# Patient Record
Sex: FEMALE | Race: WHITE | Employment: FULL TIME | URBAN - METROPOLITAN AREA
[De-identification: names, ages, dates, MRNs, and addresses within clinical notes are randomized per-mention and may not be internally consistent; named-entity substitution may affect disease eponyms.]

---

## 2017-05-19 DIAGNOSIS — I10 ESSENTIAL HYPERTENSION WITH GOAL BLOOD PRESSURE LESS THAN 130/80: ICD-10-CM

## 2017-05-19 RX ORDER — HYDROCHLOROTHIAZIDE 25 MG/1
25 TABLET ORAL DAILY
Qty: 30 TAB | Refills: 0 | Status: SHIPPED | OUTPATIENT
Start: 2017-05-19 | End: 2017-05-30 | Stop reason: SDUPTHER

## 2017-05-19 NOTE — TELEPHONE ENCOUNTER
Refill request:   Requested Prescriptions     Pending Prescriptions Disp Refills    hydroCHLOROthiazide (HYDRODIURIL) 25 mg tablet 30 Tab 11     Sig: Take 1 Tab by mouth daily.  For blood pressure       Patient completely out of medication , Pharmacy states this was faxed several times-

## 2017-05-30 ENCOUNTER — OFFICE VISIT (OUTPATIENT)
Dept: FAMILY MEDICINE CLINIC | Age: 52
End: 2017-05-30

## 2017-05-30 ENCOUNTER — TELEPHONE (OUTPATIENT)
Dept: FAMILY MEDICINE CLINIC | Age: 52
End: 2017-05-30

## 2017-05-30 VITALS
BODY MASS INDEX: 29.45 KG/M2 | SYSTOLIC BLOOD PRESSURE: 138 MMHG | RESPIRATION RATE: 12 BRPM | HEART RATE: 62 BPM | OXYGEN SATURATION: 98 % | WEIGHT: 156 LBS | TEMPERATURE: 98.1 F | HEIGHT: 61 IN | DIASTOLIC BLOOD PRESSURE: 90 MMHG

## 2017-05-30 DIAGNOSIS — F41.8 MIXED ANXIETY AND DEPRESSIVE DISORDER: ICD-10-CM

## 2017-05-30 DIAGNOSIS — Z12.11 SCREENING FOR COLON CANCER: ICD-10-CM

## 2017-05-30 DIAGNOSIS — I10 ESSENTIAL HYPERTENSION: Primary | ICD-10-CM

## 2017-05-30 DIAGNOSIS — B00.9 HERPES: ICD-10-CM

## 2017-05-30 DIAGNOSIS — B00.9 HERPES: Primary | ICD-10-CM

## 2017-05-30 RX ORDER — HYDROCHLOROTHIAZIDE 25 MG/1
25 TABLET ORAL DAILY
Qty: 30 TAB | Refills: 11 | Status: SHIPPED | OUTPATIENT
Start: 2017-05-30 | End: 2017-11-28 | Stop reason: SDUPTHER

## 2017-05-30 RX ORDER — ACYCLOVIR 50 MG/G
CREAM TOPICAL
COMMUNITY
End: 2017-05-30 | Stop reason: SDUPTHER

## 2017-05-30 RX ORDER — ACYCLOVIR 50 MG/G
CREAM TOPICAL
Qty: 5 G | Refills: 3 | Status: SHIPPED | OUTPATIENT
Start: 2017-05-30 | End: 2017-06-01

## 2017-05-30 NOTE — TELEPHONE ENCOUNTER
Received faxed from [employer] Zovirax 5 % cream not covered by her insurance $800.00 she needs something else

## 2017-05-30 NOTE — PROGRESS NOTES
Truman Gutierrez is a 47 y.o. female who was seen in clinic today (5/30/2017). Subjective:  Cardiovascular Review:  The patient has hypertension. Diet and Lifestyle: generally follows a low fat low cholesterol diet, generally follows a low sodium diet, exercises regularly, nonsmoker  Home BP Monitoring: is well controlled at home, ranging 130/60's. Pertinent ROS: taking medications as instructed, no medication side effects noted, no TIA's, no chest pain on exertion, no dyspnea on exertion, no swelling of ankles. Patient has not had colonoscopy. Seen by GYN routinely. Depression Review:  Patient is seen for followup of depression. Treatment includes Paxil, Wellbutrin, Xanax and no other therapies. Currently seen by psychiatry, Dr Estelle Catherine. Ongoing symptoms include depressed mood, psychomotor retardation and fatigue. She denies impaired memory and recurrent thoughts of death. She experiences the following side effects from the treatment: none. Prior to Admission medications    Medication Sig Start Date End Date Taking? Authorizing Provider   hydroCHLOROthiazide (HYDRODIURIL) 25 mg tablet Take 1 Tab by mouth daily. For blood pressure 5/30/17  Yes Marlo Templeton NP   acyclovir (ZOVIRAX) 5 % topical cream Apply  to affected area five (5) times daily. 5/30/17  Yes Marlo Templeton NP   PARoxetine (PAXIL) 30 mg tablet Take 30 mg by mouth daily. Take 2 tablets daily 10/6/16  Yes Historical Provider   SF 5000 PLUS 1.1 % crea BRUSH FOR 2 MINUTES AT BEDTIME, SPIT OUT, DO NOT RINSE 10/6/16  Yes Historical Provider   buPROPion XL (WELLBUTRIN XL) 300 mg XL tablet TAKE ONE TABLET BY MOUTH EVERY MORNING 11/16/16  Yes Marlo Templeton NP   ALPRAZolam [place] ) 0.5 mg tablet Take 1 Tab by mouth daily as needed for Anxiety. 11/16/16  Yes Marlo Templeton NP   multivitamin (ONE A DAY) tablet Take 1 tablet by mouth daily.    Yes Historical Provider          No Known Allergies        ROS  See HPI    Objective:   Physical Exam   Constitutional: She is oriented to person, place, and time. She appears well-developed and well-nourished. Neck: Normal range of motion. Neck supple. No JVD present. Carotid bruit is not present. No thyromegaly present. Cardiovascular: Normal rate, regular rhythm and intact distal pulses. Exam reveals no gallop and no friction rub. No murmur heard. Pulmonary/Chest: Effort normal and breath sounds normal. No respiratory distress. Musculoskeletal: She exhibits no edema. Lymphadenopathy:     She has no cervical adenopathy. Neurological: She is alert and oriented to person, place, and time. Psychiatric: She has a normal mood and affect. Her behavior is normal.   Nursing note and vitals reviewed. Visit Vitals    /90 (BP 1 Location: Right arm, BP Patient Position: Sitting)    Pulse 62    Temp 98.1 °F (36.7 °C) (Oral)    Resp 12    Ht 5' 1\" (1.549 m)    Wt 156 lb (70.8 kg)    LMP 05/16/2017 (Approximate)    SpO2 98%    BMI 29.48 kg/m2       Assessment & Plan:  Wayne Melendez was seen today for hypertension. Diagnoses and all orders for this visit:    Essential hypertension  Controlled, no medication changes at present. Continue home monitoring and call for reading >140/90  -     hydroCHLOROthiazide (HYDRODIURIL) 25 mg tablet; Take 1 Tab by mouth daily. For blood pressure    Mixed anxiety and depressive disorder  Managed by psychiatry, no changes. Screening for colon cancer  -     OCCULT BLOOD, IMMUNOASSAY (FIT)    Herpes  -     Refill acyclovir (ZOVIRAX) 5 % topical cream; Apply  to affected area five (5) times daily. I have discussed the diagnosis with the patient and the intended plan as seen in the above orders. The patient has received an after-visit summary along with patient information handout. I have discussed medication side effects and warnings with the patient as well.     Follow-up Disposition:  Return in about 6 months (around 11/30/2017) for Annual Exam - 30 minutes.         Sarah Hardy NP

## 2017-05-30 NOTE — PROGRESS NOTES
1. Have you been to the ER, urgent care clinic since your last visit? Hospitalized since your last visit? No    2. Have you seen or consulted any other health care providers outside of the [address] [place] [address] since your last visit? Include any pap smears or colon screening.  No       Chief Complaint   Patient presents with    Hypertension     fasting

## 2017-05-30 NOTE — PATIENT INSTRUCTIONS

## 2017-05-30 NOTE — MR AVS SNAPSHOT
Visit Information Date & Time Provider Department Dept. Phone Encounter #  
 5/30/2017  9:30 AM Georgia Cheung  [address] [phone] [id number] Follow-up Instructions Return in about 6 months (around 11/30/2017) for Annual Exam - 30 minutes. Upcoming Health Maintenance Date Due FOBT Q 1 YEAR, 18+ 5/7/1982 DTaP/Tdap/Td series (1 - Tdap) 5/7/1985 BREAST CANCER SCRN MAMMOGRAM 7/15/2017 INFLUENZA AGE 9 TO ADULT 8/1/2017 PAP AKA CERVICAL CYTOLOGY 10/15/2017 Allergies as of 5/30/2017  Review Complete On: 5/30/2017 By: Georgia Cheung NP No Known Allergies Current Immunizations  Never Reviewed No immunizations on file. Not reviewed this visit You Were Diagnosed With   
  
 Codes Comments Essential hypertension    -  Primary ICD-10-CM: I10 
ICD-9-CM: 401.9 Mixed anxiety and depressive disorder     ICD-10-CM: F41.8 ICD-9-CM: 300.4 Screening for colon cancer     ICD-10-CM: Z12.11 ICD-9-CM: V76.51 Herpes     ICD-10-CM: B00.9 ICD-9-CM: 054.9 Vitals BP Pulse Temp Resp Height(growth percentile) Weight(growth percentile) (!) 142/94 (BP 1 Location: Right arm, BP Patient Position: Sitting) 62 98.1 °F (36.7 °C) (Oral) 12 5' 1\" (1.549 m) 156 lb (70.8 kg) LMP SpO2 BMI OB Status Smoking Status 05/16/2017 (Approximate) 98% 29.48 kg/m2 Having regular periods Never Smoker Vitals History BMI and BSA Data Body Mass Index Body Surface Area  
 29.48 kg/m 2 1.75 m 2 Preferred Pharmacy Pharmacy Name Phone [facility] [address], [address] [phone] Your Updated Medication List  
  
   
This list is accurate as of: 5/30/17 10:38 AM.  Always use your most recent med list.  
  
  
  
  
 acyclovir 5 % topical cream  
Commonly known as:  ZOVIRAX Apply  to affected area five (5) times daily. ALPRAZolam 0.5 mg tablet Commonly known as:  [place] Take 1 Tab by mouth daily as needed for Anxiety. buPROPion  mg XL tablet Commonly known as:  WELLBUTRIN XL  
TAKE ONE TABLET BY MOUTH EVERY MORNING  
  
 hydroCHLOROthiazide 25 mg tablet Commonly known as:  HYDRODIURIL Take 1 Tab by mouth daily. For blood pressure  
  
 multivitamin tablet Commonly known as:  ONE A DAY Take 1 tablet by mouth daily. PARoxetine 30 mg tablet Commonly known as:  PAXIL Take 30 mg by mouth daily. Take 2 tablets daily SF 5000 PLUS 1.1 % Crea Generic drug:  sodium fluoride BRUSH FOR 2 MINUTES AT BEDTIME, SPIT OUT, DO NOT RINSE Prescriptions Sent to Pharmacy Refills  
 hydroCHLOROthiazide (HYDRODIURIL) 25 mg tablet 11 Sig: Take 1 Tab by mouth daily. For blood pressure Class: Normal  
 [facility] [address]  #: [phone] Route: Oral  
 acyclovir (ZOVIRAX) 5 % topical cream 3 Sig: Apply  to affected area five (5) times daily. Class: Normal  
 [facility] [address] Ph #: [phone] Route: Topical  
  
We Performed the Following OCCULT BLOOD, IMMUNOASSAY (FIT) [id number] CPT(R)] Follow-up Instructions Return in about 6 months (around 11/30/2017) for Annual Exam - 30 minutes. Patient Instructions Anxiety Disorder: Care Instructions Your Care Instructions Anxiety is a normal reaction to stress. Difficult situations can cause you to have symptoms such as sweaty palms and a nervous feeling. In an anxiety disorder, the symptoms are far more severe. Constant worry, muscle tension, trouble sleeping, nausea and diarrhea, and other symptoms can make normal daily activities difficult or impossible. These symptoms may occur for no reason, and they can affect your work, school, or social life. Medicines, counseling, and self-care can all help. Follow-up care is a key part of your treatment and safety. Be sure to make and go to all appointments, and call your doctor if you are having problems. It's also a good idea to know your test results and keep a list of the medicines you take. How can you care for yourself at home? · Take medicines exactly as directed. Call your doctor if you think you are having a problem with your medicine. · Go to your counseling sessions and follow-up appointments. · Recognize and accept your anxiety. Then, when you are in a situation that makes you anxious, say to yourself, \"This is not an emergency. I feel uncomfortable, but I am not in danger. I can keep going even if I feel anxious. \" · Be kind to your body: ¨ Relieve tension with exercise or a massage. ¨ Get enough rest. 
¨ Avoid alcohol, caffeine, nicotine, and illegal drugs. They can increase your anxiety level and cause sleep problems. ¨ Learn and do relaxation techniques. See below for more about these techniques. · Engage your mind. Get out and do something you enjoy. Go to a [employer] movie, or take a walk or hike. Plan your day. Having too much or too little to do can make you anxious. · Keep a record of your symptoms. Discuss your fears with a good friend or family member, or join a support group for people with similar problems. Talking to others sometimes relieves stress. · Get involved in social groups, or volunteer to help others. Being alone sometimes makes things seem worse than they are. · Get at least 30 minutes of exercise on most days of the week to relieve stress. Walking is a good choice. You also may want to do other activities, such as running, swimming, cycling, or playing tennis or team sports. Relaxation techniques Do relaxation exercises 10 to 20 minutes a day. You can play soothing, relaxing music while you do them, if you wish. · Tell others in your house that you are going to do your relaxation exercises. Ask them not to disturb you. · Find a comfortable place, away from all distractions and noise. · Lie down on your back, or sit with your back straight. · Focus on your breathing. Make it slow and steady. · Breathe in through your nose. Breathe out through either your nose or mouth. · Breathe deeply, filling up the area between your navel and your rib cage. Breathe so that your belly goes up and down. · Do not hold your breath. · Breathe like this for 5 to 10 minutes. Notice the feeling of calmness throughout your whole body. As you continue to breathe slowly and deeply, relax by doing the following for another 5 to 10 minutes: · Tighten and relax each muscle group in your body. You can begin at your toes and work your way up to your head. · Imagine your muscle groups relaxing and becoming heavy. · Empty your mind of all thoughts. · Let yourself relax more and more deeply. · Become aware of the state of calmness that surrounds you. · When your relaxation time is over, you can bring yourself back to alertness by moving your fingers and toes and then your hands and feet and then stretching and moving your entire body. Sometimes people fall asleep during relaxation, but they usually wake up shortly afterward. · Always give yourself time to return to full alertness before you drive a car or do anything that might cause an accident if you are not fully alert. Never play a relaxation tape while you drive a car. When should you call for help? Call 911 anytime you think you may need emergency care. For example, call if: 
· You feel you cannot stop from hurting yourself or someone else. Keep the numbers for these national suicide hotlines: 1-211-855-TALK (7-146-013-473.305.9585) and 0-299-ZBELDCD (4-847.909.1652). If you or someone you know talks about suicide or feeling hopeless, get help right away. Watch closely for changes in your health, and be sure to contact your doctor if: 
· You have anxiety or fear that affects your life. · You have symptoms of anxiety that are new or different from those you had before. Where can you learn more? Go to http://janett-nano.info/. Enter P754 in the search box to learn more about \"Anxiety Disorder: Care Instructions. \" Current as of: July 26, 2016 Content Version: 11.2 © 2803-3660 [employer]. Care instructions adapted under license by [employer] (which disclaims liability or warranty for this information). If you have questions about a medical condition or this instruction, always ask your healthcare professional. [address] any warranty or liability for your use of this information. Introducing [facility] [place] [facility]! Dear Jarek Pace: 
Thank you for requesting a [employer] [place] account. Our records indicate that you have previously registered for a [employer] [place] account but its currently inactive. Please call our [employer] [place] support line at [phone]. Additional Information If you have questions, please visit the Frequently Asked Questions section of the [employer] [place] website at [email or web address] Remember, [employer] [place] is NOT to be used for urgent needs. For medical emergencies, dial 911. Now available from your iPhone and Android! Please provide this summary of care documentation to your next provider. Your primary care clinician is listed as Jose Martel. If you have any questions after today's visit, please call [phone].

## 2017-06-01 RX ORDER — ACYCLOVIR 400 MG/1
400 TABLET ORAL 3 TIMES DAILY
Qty: 15 TAB | Refills: 3 | Status: SHIPPED | OUTPATIENT
Start: 2017-06-01 | End: 2017-06-06

## 2017-06-01 NOTE — TELEPHONE ENCOUNTER
[phone] home attempted to call patient no answer left message to call me back in regards to her medication

## 2017-06-01 NOTE — TELEPHONE ENCOUNTER
No alternative topical option that is cheaper. Will send in Acyclovir oral tablets to be used as needed for her symptoms.

## 2017-07-28 RX ORDER — PAROXETINE 30 MG/1
30 TABLET, FILM COATED ORAL DAILY
Qty: 30 TAB | Refills: 11 | Status: SHIPPED | OUTPATIENT
Start: 2017-07-28 | End: 2017-08-04 | Stop reason: SDUPTHER

## 2017-08-04 ENCOUNTER — OFFICE VISIT (OUTPATIENT)
Dept: FAMILY MEDICINE CLINIC | Age: 52
End: 2017-08-04

## 2017-08-04 VITALS
TEMPERATURE: 98.1 F | SYSTOLIC BLOOD PRESSURE: 136 MMHG | HEIGHT: 61 IN | RESPIRATION RATE: 12 BRPM | HEART RATE: 72 BPM | WEIGHT: 151.2 LBS | DIASTOLIC BLOOD PRESSURE: 86 MMHG | BODY MASS INDEX: 28.55 KG/M2 | OXYGEN SATURATION: 99 %

## 2017-08-04 DIAGNOSIS — F41.8 MIXED ANXIETY AND DEPRESSIVE DISORDER: Primary | ICD-10-CM

## 2017-08-04 RX ORDER — PAROXETINE 30 MG/1
60 TABLET, FILM COATED ORAL DAILY
Qty: 30 TAB | Refills: 11 | COMMUNITY
Start: 2017-08-04 | End: 2017-11-28 | Stop reason: SDUPTHER

## 2017-08-04 NOTE — PATIENT INSTRUCTIONS

## 2017-08-04 NOTE — PROGRESS NOTES
Lisa Banerjee is a 47 y.o. female who was seen in clinic today (8/6/2017). Subjective: Anxiety  Patient complains of anxiety and depressive disorder, panic attacks, sleep disturbance. She has the following symptoms: insomnia, racing thoughts, psychomotor agitation, feelings of losing control, difficulty concentrating, depressed mood, anhedonia and weight loss. Onset of symptoms was approximately several years ago, gradually worsening since that time. She denies current suicidal and homicidal ideation. Treatment includes Paxil, Wellbutrin, Xanax and no other therapies. Currently seen by psychiatry, Dr Sobia Delacruz. Prior to Admission medications    Medication Sig Start Date End Date Taking? Authorizing Provider   PARoxetine (PAXIL) 30 mg tablet Take 2 Tabs by mouth daily. 8/4/17  Yes Yasmine Valle NP   hydroCHLOROthiazide (HYDRODIURIL) 25 mg tablet Take 1 Tab by mouth daily. For blood pressure 5/30/17   Yasmine Valle NP   SF 5000 PLUS 1.1 % crea BRUSH FOR 2 MINUTES AT BEDTIME, SPIT OUT, DO NOT RINSE 10/6/16   Historical Provider   buPROPion XL (WELLBUTRIN XL) 300 mg XL tablet TAKE ONE TABLET BY MOUTH EVERY MORNING 11/16/16   Yasmine Valle NP   ALPRAZolam [place]) 0.5 mg tablet Take 1 Tab by mouth daily as needed for Anxiety. 11/16/16   Yasmine Valle NP   multivitamin (ONE A DAY) tablet Take 1 tablet by mouth daily. Historical Provider          No Known Allergies        ROS  See HPI    Objective:   Physical Exam   Constitutional: She is oriented to person, place, and time. She appears well-developed and well-nourished. Cardiovascular: Normal rate, regular rhythm and intact distal pulses. Exam reveals no gallop and no friction rub. No murmur heard. Pulmonary/Chest: Effort normal and breath sounds normal. No respiratory distress. Neurological: She is alert and oriented to person, place, and time.    Psychiatric: Her speech is normal and behavior is normal. Judgment and thought content normal. She exhibits a depressed mood. Nursing note and vitals reviewed. Visit Vitals    /86 (BP 1 Location: Right arm, BP Patient Position: Sitting)    Pulse 72    Temp 98.1 °F (36.7 °C) (Oral)    Resp 12    Ht 5' 1\" (1.549 m)    Wt 151 lb 3.2 oz (68.6 kg)    LMP 07/20/2017 (Exact Date)    SpO2 99%    BMI 28.57 kg/m2       Assessment & Plan:  Diagnoses and all orders for this visit:    1. Mixed anxiety and depressive disorder  Medications managed by psychiatry, no changes at present. List of counselors provided. I have discussed the diagnosis with the patient and the intended plan as seen in the above orders. The patient has received an after-visit summary along with patient information handout. I have discussed medication side effects and warnings with the patient as well. Follow-up Disposition:  Return if symptoms worsen or fail to improve.         Haylie Mejia NP

## 2017-08-04 NOTE — PROGRESS NOTES
1. Have you been to the ER, urgent care clinic since your last visit? Hospitalized since your last visit? No    2. Have you seen or consulted any other health care providers outside of the [address] [place] [address] since your last visit? Include any pap smears or colon screening.  No     Chief Complaint   Patient presents with    Anxiety     for the last 1.5 years- mornings are especially hard- has been taking 1/2 of her 0.5 mg tablet of xanax daily for the last 7 days- mood is affecting appetite

## 2017-11-28 ENCOUNTER — OFFICE VISIT (OUTPATIENT)
Dept: FAMILY MEDICINE CLINIC | Age: 52
End: 2017-11-28

## 2017-11-28 VITALS
HEIGHT: 61 IN | TEMPERATURE: 97.6 F | DIASTOLIC BLOOD PRESSURE: 86 MMHG | SYSTOLIC BLOOD PRESSURE: 127 MMHG | OXYGEN SATURATION: 98 % | WEIGHT: 156 LBS | BODY MASS INDEX: 29.45 KG/M2 | RESPIRATION RATE: 15 BRPM | HEART RATE: 58 BPM

## 2017-11-28 DIAGNOSIS — S62.102A CLOSED FRACTURE OF LEFT WRIST, INITIAL ENCOUNTER: ICD-10-CM

## 2017-11-28 DIAGNOSIS — N92.4 EXCESSIVE BLEEDING IN PREMENOPAUSAL PERIOD: ICD-10-CM

## 2017-11-28 DIAGNOSIS — I10 ESSENTIAL HYPERTENSION: Primary | ICD-10-CM

## 2017-11-28 DIAGNOSIS — F41.8 MIXED ANXIETY DEPRESSIVE DISORDER: ICD-10-CM

## 2017-11-28 RX ORDER — BUPROPION HYDROCHLORIDE 300 MG/1
TABLET ORAL
Qty: 90 TAB | Refills: 3 | Status: SHIPPED | OUTPATIENT
Start: 2017-11-28 | End: 2018-11-29 | Stop reason: SDUPTHER

## 2017-11-28 RX ORDER — ALPRAZOLAM 0.5 MG/1
0.5 TABLET ORAL
Qty: 20 TAB | Refills: 0 | Status: SHIPPED | OUTPATIENT
Start: 2017-11-28 | End: 2018-06-01 | Stop reason: SDUPTHER

## 2017-11-28 RX ORDER — PAROXETINE 30 MG/1
60 TABLET, FILM COATED ORAL DAILY
Qty: 180 TAB | Refills: 3 | Status: SHIPPED | OUTPATIENT
Start: 2017-11-28 | End: 2018-05-29 | Stop reason: DRUGHIGH

## 2017-11-28 RX ORDER — HYDROCHLOROTHIAZIDE 25 MG/1
25 TABLET ORAL DAILY
Qty: 90 TAB | Refills: 3 | Status: SHIPPED | OUTPATIENT
Start: 2017-11-28 | End: 2018-11-29 | Stop reason: SDUPTHER

## 2017-11-28 NOTE — PROGRESS NOTES
Chief Complaint   Patient presents with    Hypertension     6 month follow up     1. Have you been to the ER, urgent care clinic since your last visit? Hospitalized since your last visit? Yes- broken left wrist-[facility]/2017[facility]    2. Have you seen or consulted any other health care providers outside of the [address] [place] [address] since your last visit? Include any pap smears or colon screening.  Dr. Awan [facility] orthopedics- 11/20/17

## 2017-11-28 NOTE — MR AVS SNAPSHOT
Visit Information Date & Time Provider Department Dept. Phone Encounter #  
 11/28/2017  9:30 AM Lindsay Ramos  [facility] [address] [phone] [id number] Follow-up Instructions Return in about 6 months (around 5/28/2018) for blood pressure. Upcoming Health Maintenance Date Due FOBT Q 1 YEAR, 18+ 5/7/1982 BREAST CANCER SCRN MAMMOGRAM 8/2/2019 PAP AKA CERVICAL CYTOLOGY 8/1/2020 DTaP/Tdap/Td series (2 - Td) 8/4/2027 Allergies as of 11/28/2017  Review Complete On: 11/28/2017 By: Lindsay Ramos NP No Known Allergies Current Immunizations  Never Reviewed No immunizations on file. Not reviewed this visit You Were Diagnosed With   
  
 Codes Comments Essential hypertension    -  Primary ICD-10-CM: I10 
ICD-9-CM: 401.9 Mixed anxiety and depressive disorder     ICD-10-CM: F41.8 ICD-9-CM: 300.4 Excessive bleeding in premenopausal period     ICD-10-CM: N92.4 ICD-9-CM: 627.0 Closed fracture of left wrist, initial encounter     ICD-10-CM: G39.347L ICD-9-CM: 814.00 Mixed anxiety depressive disorder     ICD-10-CM: F41.8 ICD-9-CM: 300.4 Vitals BP Pulse Temp Resp Height(growth percentile) Weight(growth percentile) 127/86 (BP 1 Location: Right arm, BP Patient Position: Sitting) (!) 58 97.6 °F (36.4 °C) (Oral) 15 5' 1\" (1.549 m) 156 lb (70.8 kg) LMP SpO2 BMI OB Status Smoking Status 11/07/2017 (Approximate) 98% 29.48 kg/m2 Having regular periods Never Smoker Vitals History BMI and BSA Data Body Mass Index Body Surface Area  
 29.48 kg/m 2 1.75 m 2 Preferred Pharmacy Pharmacy Name Phone [facility] [address] [phone] Your Updated Medication List  
  
   
This list is accurate as of: 11/28/17 10:30 AM.  Always use your most recent med list.  
  
  
  
  
 ALPRAZolam 0.5 mg tablet Commonly known as:  Sadi Galvez Take 1 Tab by mouth daily as needed for Anxiety. buPROPion  mg XL tablet Commonly known as:  WELLBUTRIN XL  
TAKE ONE TABLET BY MOUTH EVERY MORNING  
  
 hydroCHLOROthiazide 25 mg tablet Commonly known as:  HYDRODIURIL Take 1 Tab by mouth daily. For blood pressure  
  
 multivitamin tablet Commonly known as:  ONE A DAY Take 1 tablet by mouth daily. PARoxetine 30 mg tablet Commonly known as:  PAXIL Take 2 Tabs by mouth daily. SF 5000 PLUS 1.1 % Crea Generic drug:  fluoride (sodium) BRUSH FOR 2 MINUTES AT BEDTIME, SPIT OUT, DO NOT RINSE Prescriptions Printed Refills buPROPion XL (WELLBUTRIN XL) 300 mg XL tablet 3 Sig: TAKE ONE TABLET BY MOUTH EVERY MORNING Class: Print PARoxetine (PAXIL) 30 mg tablet 3 Sig: Take 2 Tabs by mouth daily. Class: Print Route: Oral  
 hydroCHLOROthiazide (HYDRODIURIL) 25 mg tablet 3 Sig: Take 1 Tab by mouth daily. For blood pressure Class: Print Route: Oral  
 ALPRAZolam (XANAX) 0.5 mg tablet 0 Sig: Take 1 Tab by mouth daily as needed for Anxiety. Class: Print Route: Oral  
  
We Performed the Following CBC W/O DIFF [26089 CPT(R)] ESTRADIOL [id number] CPT(R)] [facility] AND LH [50433 CPT(R)] LIPID PANEL [81723 CPT(R)] METABOLIC PANEL, COMPREHENSIVE [30070 CPT(R)] TSH AND FREE T4 [43751 CPT(R)] Follow-up Instructions Return in about 6 months (around 5/28/2018) for blood pressure. Patient Instructions High Blood Pressure: Care Instructions Your Care Instructions If your blood pressure is usually above 140/90, you have high blood pressure, or hypertension. That means the top number is 140 or higher or the bottom number is 90 or higher, or both. Despite what a lot of people think, high blood pressure usually doesn't cause headaches or make you feel dizzy or lightheaded.  It usually has no symptoms. But it does increase your risk for heart attack, stroke, and kidney or eye damage. The higher your blood pressure, the more your risk increases. Your doctor will give you a goal for your blood pressure. Your goal will be based on your health and your age. An example of a goal is to keep your blood pressure below 140/90. Lifestyle changes, such as eating healthy and being active, are always important to help lower blood pressure. You might also take medicine to reach your blood pressure goal. 
Follow-up care is a key part of your treatment and safety. Be sure to make and go to all appointments, and call your doctor if you are having problems. It's also a good idea to know your test results and keep a list of the medicines you take. How can you care for yourself at home? Medical treatment · If you stop taking your medicine, your blood pressure will go back up. You may take one or more types of medicine to lower your blood pressure. Be safe with medicines. Take your medicine exactly as prescribed. Call your doctor if you think you are having a problem with your medicine. · Talk to your doctor before you start taking aspirin every day. Aspirin can help certain people lower their risk of a heart attack or stroke. But taking aspirin isn't right for everyone, because it can cause serious bleeding. · See your doctor regularly. You may need to see the doctor more often at first or until your blood pressure comes down. · If you are taking blood pressure medicine, talk to your doctor before you take decongestants or anti-inflammatory medicine, such as ibuprofen. Some of these medicines can raise blood pressure. · Learn how to check your blood pressure at home. Lifestyle changes · Stay at a healthy weight. This is especially important if you put on weight around the waist. Losing even 10 pounds can help you lower your blood pressure. · If your doctor recommends it, get more exercise.  Walking is a good choice. Bit by bit, increase the amount you walk every day. Try for at least 30 minutes on most days of the week. You also may want to swim, bike, or do other activities. · Avoid or limit alcohol. Talk to your doctor about whether you can drink any alcohol. · Try to limit how much sodium you eat to less than 2,300 milligrams (mg) a day. Your doctor may ask you to try to eat less than 1,500 mg a day. · Eat plenty of fruits (such as bananas and oranges), vegetables, legumes, whole grains, and low-fat dairy products. · Lower the amount of saturated fat in your diet. Saturated fat is found in animal products such as milk, cheese, and meat. Limiting these foods may help you lose weight and also lower your risk for heart disease. · Do not smoke. Smoking increases your risk for heart attack and stroke. If you need help quitting, talk to your doctor about stop-smoking programs and medicines. These can increase your chances of quitting for good. When should you call for help? Call 911 anytime you think you may need emergency care. This may mean having symptoms that suggest that your blood pressure is causing a serious heart or blood vessel problem. Your blood pressure may be over 180/110. ? For example, call 911 if: 
? · You have symptoms of a heart attack. These may include: ¨ Chest pain or pressure, or a strange feeling in the chest. 
¨ Sweating. ¨ Shortness of breath. ¨ Nausea or vomiting. ¨ Pain, pressure, or a strange feeling in the back, neck, jaw, or upper belly or in one or both shoulders or arms. ¨ Lightheadedness or sudden weakness. ¨ A fast or irregular heartbeat. ? · You have symptoms of a stroke. These may include: 
¨ Sudden numbness, tingling, weakness, or loss of movement in your face, arm, or leg, especially on only one side of your body. ¨ Sudden vision changes. ¨ Sudden trouble speaking. ¨ Sudden confusion or trouble understanding simple statements. ¨ Sudden problems with walking or balance. ¨ A sudden, severe headache that is different from past headaches. ? · You have severe back or belly pain. ?Do not wait until your blood pressure comes down on its own. Get help right away. ?Call your doctor now or seek immediate care if: 
? · Your blood pressure is much higher than normal (such as 180/110 or higher), but you don't have symptoms. ? · You think high blood pressure is causing symptoms, such as: ¨ Severe headache. ¨ Blurry vision. ? Watch closely for changes in your health, and be sure to contact your doctor if: 
? · Your blood pressure measures 140/90 or higher at least 2 times. That means the top number is 140 or higher or the bottom number is 90 or higher, or both. ? · You think you may be having side effects from your blood pressure medicine. ? · Your blood pressure is usually normal, but it goes above normal at least 2 times. Where can you learn more? Go to http://janett-nano.info/. Enter F395 in the search box to learn more about \"High Blood Pressure: Care Instructions. \" Current as of: September 21, 2016 Content Version: 11.4 © 1524-9753 [employer]. Care instructions adapted under license by [employer] (which disclaims liability or warranty for this information). If you have questions about a medical condition or this instruction, always ask your healthcare professional. [place] [address] any warranty or liability for your use of this information. Introducing [facility]! Dear Wilson Jacobs: 
Thank you for requesting a Wheel[employer] account. Our records indicate that you have previously registered for a Wheel[employer] account but its currently inactive. Please call our [employer] support line at [phone]. Additional Information If you have questions, please visit the Frequently Asked Questions section of the [employer] website at https://[employer]. [employer]. com/[employer]/. Remember, MyChart is NOT to be used for urgent needs. For medical emergencies, dial 911. Now available from your iPhone and Android! Please provide this summary of care documentation to your next provider. Your primary care clinician is listed as Jeanna Vo. If you have any questions after today's visit, please call [phone].

## 2017-11-28 NOTE — PATIENT INSTRUCTIONS
High Blood Pressure: Care Instructions  Your Care Instructions    If your blood pressure is usually above 140/90, you have high blood pressure, or hypertension. That means the top number is 140 or higher or the bottom number is 90 or higher, or both. Despite what a lot of people think, high blood pressure usually doesn't cause headaches or make you feel dizzy or lightheaded. It usually has no symptoms. But it does increase your risk for heart attack, stroke, and kidney or eye damage. The higher your blood pressure, the more your risk increases. Your doctor will give you a goal for your blood pressure. Your goal will be based on your health and your age. An example of a goal is to keep your blood pressure below 140/90. Lifestyle changes, such as eating healthy and being active, are always important to help lower blood pressure. You might also take medicine to reach your blood pressure goal.  Follow-up care is a key part of your treatment and safety. Be sure to make and go to all appointments, and call your doctor if you are having problems. It's also a good idea to know your test results and keep a list of the medicines you take. How can you care for yourself at home? Medical treatment  · If you stop taking your medicine, your blood pressure will go back up. You may take one or more types of medicine to lower your blood pressure. Be safe with medicines. Take your medicine exactly as prescribed. Call your doctor if you think you are having a problem with your medicine. · Talk to your doctor before you start taking aspirin every day. Aspirin can help certain people lower their risk of a heart attack or stroke. But taking aspirin isn't right for everyone, because it can cause serious bleeding. · See your doctor regularly. You may need to see the doctor more often at first or until your blood pressure comes down.   · If you are taking blood pressure medicine, talk to your doctor before you take decongestants or anti-inflammatory medicine, such as ibuprofen. Some of these medicines can raise blood pressure. · Learn how to check your blood pressure at home. Lifestyle changes  · Stay at a healthy weight. This is especially important if you put on weight around the waist. Losing even 10 pounds can help you lower your blood pressure. · If your doctor recommends it, get more exercise. Walking is a good choice. Bit by bit, increase the amount you walk every day. Try for at least 30 minutes on most days of the week. You also may want to swim, bike, or do other activities. · Avoid or limit alcohol. Talk to your doctor about whether you can drink any alcohol. · Try to limit how much sodium you eat to less than 2,300 milligrams (mg) a day. Your doctor may ask you to try to eat less than 1,500 mg a day. · Eat plenty of fruits (such as bananas and oranges), vegetables, legumes, whole grains, and low-fat dairy products. · Lower the amount of saturated fat in your diet. Saturated fat is found in animal products such as milk, cheese, and meat. Limiting these foods may help you lose weight and also lower your risk for heart disease. · Do not smoke. Smoking increases your risk for heart attack and stroke. If you need help quitting, talk to your doctor about stop-smoking programs and medicines. These can increase your chances of quitting for good. When should you call for help? Call 911 anytime you think you may need emergency care. This may mean having symptoms that suggest that your blood pressure is causing a serious heart or blood vessel problem. Your blood pressure may be over 180/110. ? For example, call 911 if:  ? · You have symptoms of a heart attack. These may include:  ¨ Chest pain or pressure, or a strange feeling in the chest.  ¨ Sweating. ¨ Shortness of breath. ¨ Nausea or vomiting.   ¨ Pain, pressure, or a strange feeling in the back, neck, jaw, or upper belly or in one or both shoulders or arms.  ¨ Lightheadedness or sudden weakness. ¨ A fast or irregular heartbeat. ? · You have symptoms of a stroke. These may include:  ¨ Sudden numbness, tingling, weakness, or loss of movement in your face, arm, or leg, especially on only one side of your body. ¨ Sudden vision changes. ¨ Sudden trouble speaking. ¨ Sudden confusion or trouble understanding simple statements. ¨ Sudden problems with walking or balance. ¨ A sudden, severe headache that is different from past headaches. ? · You have severe back or belly pain. ?Do not wait until your blood pressure comes down on its own. Get help right away. ?Call your doctor now or seek immediate care if:  ? · Your blood pressure is much higher than normal (such as 180/110 or higher), but you don't have symptoms. ? · You think high blood pressure is causing symptoms, such as:  ¨ Severe headache. ¨ Blurry vision. ? Watch closely for changes in your health, and be sure to contact your doctor if:  ? · Your blood pressure measures 140/90 or higher at least 2 times. That means the top number is 140 or higher or the bottom number is 90 or higher, or both. ? · You think you may be having side effects from your blood pressure medicine. ? · Your blood pressure is usually normal, but it goes above normal at least 2 times. Where can you learn more? Go to http://janett-nano.info/. Enter G485 in the search box to learn more about \"High Blood Pressure: Care Instructions. \"  Current as of: September 21, 2016  Content Version: 11.4  © 0501-9682 [employer]. Care instructions adapted under license by [employer] (which disclaims liability or warranty for this information). If you have questions about a medical condition or this instruction, always ask your healthcare professional. [place] [address] any warranty or liability for your use of this information.

## 2017-11-28 NOTE — PROGRESS NOTES
David Calderon is a 47 y.o. female who was seen in clinic today (11/28/2017). Subjective:  Cardiovascular Review:  The patient has hypertension. Diet and Lifestyle: generally follows a low fat low cholesterol diet, generally follows a low sodium diet, exercises regularly, nonsmoker  Home BP Monitoring: is well controlled at home, ranging 130/60's. Pertinent ROS: taking medications as instructed, no medication side effects noted, no TIA's, no chest pain on exertion, no dyspnea on exertion, no swelling of ankles. Patient had colonoscopy in 8/2017 with normal findings. Seen by GYN routinely. Patient having monthly menstrual periods with spotting between cycles. Anxiety  Patient complains of anxiety and depressive disorder, panic attacks, sleep disturbance. She has the following symptoms: insomnia, racing thoughts, psychomotor agitation, feelings of losing control, difficulty concentrating, depressed mood, anhedonia and weight loss. Onset of symptoms was approximately several years ago, gradually worsening since that time. She denies current suicidal and homicidal ideation. Treatment includes Paxil, Wellbutrin, Xanax and no other therapies. Currently seen by psychiatry, Dr Luis Acevedo and counselor routinely. Patient suffered a left wrist fracture playing soccer on 11/24/17. Seen by Dr. Elsy Maldonado. Prior to Admission medications    Medication Sig Start Date End Date Taking? Authorizing Provider   buPROPion XL (WELLBUTRIN XL) 300 mg XL tablet TAKE ONE TABLET BY MOUTH EVERY MORNING 11/28/17  Yes Rafael Sierra NP   PARoxetine (PAXIL) 30 mg tablet Take 2 Tabs by mouth daily. 11/28/17  Yes Rafael Sierra NP   hydroCHLOROthiazide (HYDRODIURIL) 25 mg tablet Take 1 Tab by mouth daily. For blood pressure 11/28/17  Yes Rafael Sierra NP   ALPRAZolam Annye Poser) 0.5 mg tablet Take 1 Tab by mouth daily as needed for Anxiety.  11/28/17  Yes Rafael Sierra NP   SF 5000 PLUS 1.1 % crea BRUSH FOR 2 MINUTES AT BEDTIME, SPIT OUT, DO NOT RINSE 10/6/16  Yes Historical Provider   multivitamin (ONE A DAY) tablet Take 1 tablet by mouth daily. Yes Historical Provider          No Known Allergies        ROS  See HPI    Objective:   Physical Exam   Constitutional: She is oriented to person, place, and time. She appears well-developed and well-nourished. Neck: Normal range of motion. Neck supple. No JVD present. Carotid bruit is not present. No thyromegaly present. Cardiovascular: Normal rate, regular rhythm and intact distal pulses. Exam reveals no gallop and no friction rub. No murmur heard. Pulmonary/Chest: Effort normal and breath sounds normal. No respiratory distress. Musculoskeletal: She exhibits no edema. Lymphadenopathy:     She has no cervical adenopathy. Neurological: She is alert and oriented to person, place, and time. Psychiatric: She has a normal mood and affect. Her behavior is normal.   Nursing note and vitals reviewed. Visit Vitals    /86 (BP 1 Location: Right arm, BP Patient Position: Sitting)    Pulse (!) 58    Temp 97.6 °F (36.4 °C) (Oral)    Resp 15    Ht 5' 1\" (1.549 m)    Wt 156 lb (70.8 kg)    LMP 11/07/2017 (Approximate)    SpO2 98%    BMI 29.48 kg/m2       Assessment & Plan:  Diagnoses and all orders for this visit:    1. Essential hypertension  Well controlled, no medication changes.  -     METABOLIC PANEL, COMPREHENSIVE  -     LIPID PANEL  -     hydroCHLOROthiazide (HYDRODIURIL) 25 mg tablet; Take 1 Tab by mouth daily. For blood pressure    2. Excessive bleeding in premenopausal period  Check hormone levels. Patient to follow up with GYN for exam.   -     TSH AND FREE T4  -     ESTRADIOL  -     FSH AND LH  -     CBC W/O DIFF    3. Closed fracture of left wrist, initial encounter  Stable, no changes to current therapy. DEXA deferred at present.      4. Mixed anxiety depressive disorder  Stable, no changes to current therapy  -     buPROPion XL [facility] XL) 300 mg XL tablet; TAKE ONE TABLET BY MOUTH EVERY MORNING  -     ALPRAZolam (XANAX) 0.5 mg tablet; Take 1 Tab by mouth daily as needed for Anxiety. -     PARoxetine (PAXIL) 30 mg tablet; Take 2 Tabs by mouth daily. I have discussed the diagnosis with the patient and the intended plan as seen in the above orders. The patient has received an after-visit summary along with patient information handout. I have discussed medication side effects and warnings with the patient as well. Follow-up Disposition:  Return in about 6 months (around 5/28/2018) for blood pressure.         Matt Lowe NP

## 2017-11-29 LAB
ALBUMIN SERPL-MCNC: 3.8 G/DL (ref 3.5–5.5)
ALBUMIN/GLOB SERPL: 1.3 {RATIO} (ref 1.2–2.2)
ALP SERPL-CCNC: 75 IU/L (ref 39–117)
ALT SERPL-CCNC: 15 IU/L (ref 0–32)
AST SERPL-CCNC: 19 IU/L (ref 0–40)
BILIRUB SERPL-MCNC: 0.3 MG/DL (ref 0–1.2)
BUN SERPL-MCNC: 17 MG/DL (ref 6–24)
BUN/CREAT SERPL: 21 (ref 9–23)
CALCIUM SERPL-MCNC: 9.2 MG/DL (ref 8.7–10.2)
CHLORIDE SERPL-SCNC: 99 MMOL/L (ref 96–106)
CHOLEST SERPL-MCNC: 173 MG/DL (ref 100–199)
CO2 SERPL-SCNC: 28 MMOL/L (ref 18–29)
CREAT SERPL-MCNC: 0.81 MG/DL (ref 0.57–1)
ERYTHROCYTE [DISTWIDTH] IN BLOOD BY AUTOMATED COUNT: 13.2 % (ref 12.3–15.4)
ESTRADIOL SERPL-MCNC: 71.2 PG/ML
FSH SERPL-ACNC: 2.8 MIU/ML
GFR SERPLBLD CREATININE-BSD FMLA CKD-EPI: 83 ML/MIN/1.73
GFR SERPLBLD CREATININE-BSD FMLA CKD-EPI: 96 ML/MIN/1.73
GLOBULIN SER CALC-MCNC: 2.9 G/DL (ref 1.5–4.5)
GLUCOSE SERPL-MCNC: 80 MG/DL (ref 65–99)
HCT VFR BLD AUTO: 38.1 % (ref 34–46.6)
HDLC SERPL-MCNC: 75 MG/DL
HGB BLD-MCNC: 12.9 G/DL (ref 11.1–15.9)
INTERPRETATION, 910389: NORMAL
LDLC SERPL CALC-MCNC: 89 MG/DL (ref 0–99)
LH SERPL-ACNC: 3.5 MIU/ML
MCH RBC QN AUTO: 28.3 PG (ref 26.6–33)
MCHC RBC AUTO-ENTMCNC: 33.9 G/DL (ref 31.5–35.7)
MCV RBC AUTO: 84 FL (ref 79–97)
PLATELET # BLD AUTO: 361 X10E3/UL (ref 150–379)
POTASSIUM SERPL-SCNC: 4.4 MMOL/L (ref 3.5–5.2)
PROT SERPL-MCNC: 6.7 G/DL (ref 6–8.5)
RBC # BLD AUTO: 4.56 X10E6/UL (ref 3.77–5.28)
SODIUM SERPL-SCNC: 141 MMOL/L (ref 134–144)
T4 FREE SERPL-MCNC: 1.12 NG/DL (ref 0.82–1.77)
TRIGL SERPL-MCNC: 47 MG/DL (ref 0–149)
TSH SERPL DL<=0.005 MIU/L-ACNC: 1.72 UIU/ML (ref 0.45–4.5)
VLDLC SERPL CALC-MCNC: 9 MG/DL (ref 5–40)
WBC # BLD AUTO: 4.8 X10E3/UL (ref 3.4–10.8)

## 2018-05-29 ENCOUNTER — OFFICE VISIT (OUTPATIENT)
Dept: FAMILY MEDICINE CLINIC | Age: 53
End: 2018-05-29

## 2018-05-29 VITALS
TEMPERATURE: 98.6 F | SYSTOLIC BLOOD PRESSURE: 129 MMHG | DIASTOLIC BLOOD PRESSURE: 83 MMHG | WEIGHT: 155 LBS | RESPIRATION RATE: 18 BRPM | HEART RATE: 70 BPM | BODY MASS INDEX: 29.27 KG/M2 | OXYGEN SATURATION: 97 % | HEIGHT: 61 IN

## 2018-05-29 DIAGNOSIS — F41.8 MIXED ANXIETY AND DEPRESSIVE DISORDER: ICD-10-CM

## 2018-05-29 DIAGNOSIS — I10 ESSENTIAL HYPERTENSION: Primary | ICD-10-CM

## 2018-05-29 DIAGNOSIS — Z13.820 SCREENING FOR OSTEOPOROSIS: ICD-10-CM

## 2018-05-29 DIAGNOSIS — Z12.83 SCREENING FOR SKIN CANCER: ICD-10-CM

## 2018-05-29 RX ORDER — PAROXETINE HYDROCHLORIDE 40 MG/1
40 TABLET, FILM COATED ORAL DAILY
Qty: 90 TAB | Refills: 1 | Status: SHIPPED | OUTPATIENT
Start: 2018-05-29 | End: 2018-07-05

## 2018-05-29 NOTE — PATIENT INSTRUCTIONS
High Blood Pressure: Care Instructions  Your Care Instructions    If your blood pressure is usually above 140/90, you have high blood pressure, or hypertension. That means the top number is 140 or higher or the bottom number is 90 or higher, or both. Despite what a lot of people think, high blood pressure usually doesn't cause headaches or make you feel dizzy or lightheaded. It usually has no symptoms. But it does increase your risk for heart attack, stroke, and kidney or eye damage. The higher your blood pressure, the more your risk increases. Your doctor will give you a goal for your blood pressure. Your goal will be based on your health and your age. An example of a goal is to keep your blood pressure below 140/90. Lifestyle changes, such as eating healthy and being active, are always important to help lower blood pressure. You might also take medicine to reach your blood pressure goal.  Follow-up care is a key part of your treatment and safety. Be sure to make and go to all appointments, and call your doctor if you are having problems. It's also a good idea to know your test results and keep a list of the medicines you take. How can you care for yourself at home? Medical treatment  · If you stop taking your medicine, your blood pressure will go back up. You may take one or more types of medicine to lower your blood pressure. Be safe with medicines. Take your medicine exactly as prescribed. Call your doctor if you think you are having a problem with your medicine. · Talk to your doctor before you start taking aspirin every day. Aspirin can help certain people lower their risk of a heart attack or stroke. But taking aspirin isn't right for everyone, because it can cause serious bleeding. · See your doctor regularly. You may need to see the doctor more often at first or until your blood pressure comes down.   · If you are taking blood pressure medicine, talk to your doctor before you take decongestants or anti-inflammatory medicine, such as ibuprofen. Some of these medicines can raise blood pressure. · Learn how to check your blood pressure at home. Lifestyle changes  · Stay at a healthy weight. This is especially important if you put on weight around the waist. Losing even 10 pounds can help you lower your blood pressure. · If your doctor recommends it, get more exercise. Walking is a good choice. Bit by bit, increase the amount you walk every day. Try for at least 30 minutes on most days of the week. You also may want to swim, bike, or do other activities. · Avoid or limit alcohol. Talk to your doctor about whether you can drink any alcohol. · Try to limit how much sodium you eat to less than 2,300 milligrams (mg) a day. Your doctor may ask you to try to eat less than 1,500 mg a day. · Eat plenty of fruits (such as bananas and oranges), vegetables, legumes, whole grains, and low-fat dairy products. · Lower the amount of saturated fat in your diet. Saturated fat is found in animal products such as milk, cheese, and meat. Limiting these foods may help you lose weight and also lower your risk for heart disease. · Do not smoke. Smoking increases your risk for heart attack and stroke. If you need help quitting, talk to your doctor about stop-smoking programs and medicines. These can increase your chances of quitting for good. When should you call for help? Call 911 anytime you think you may need emergency care. This may mean having symptoms that suggest that your blood pressure is causing a serious heart or blood vessel problem. Your blood pressure may be over 180/110. ? For example, call 911 if:  ? · You have symptoms of a heart attack. These may include:  ¨ Chest pain or pressure, or a strange feeling in the chest.  ¨ Sweating. ¨ Shortness of breath. ¨ Nausea or vomiting.   ¨ Pain, pressure, or a strange feeling in the back, neck, jaw, or upper belly or in one or both shoulders or arms.  ¨ Lightheadedness or sudden weakness. ¨ A fast or irregular heartbeat. ? · You have symptoms of a stroke. These may include:  ¨ Sudden numbness, tingling, weakness, or loss of movement in your face, arm, or leg, especially on only one side of your body. ¨ Sudden vision changes. ¨ Sudden trouble speaking. ¨ Sudden confusion or trouble understanding simple statements. ¨ Sudden problems with walking or balance. ¨ A sudden, severe headache that is different from past headaches. ? · You have severe back or belly pain. ?Do not wait until your blood pressure comes down on its own. Get help right away. ?Call your doctor now or seek immediate care if:  ? · Your blood pressure is much higher than normal (such as 180/110 or higher), but you don't have symptoms. ? · You think high blood pressure is causing symptoms, such as:  ¨ Severe headache. ¨ Blurry vision. ? Watch closely for changes in your health, and be sure to contact your doctor if:  ? · Your blood pressure measures 140/90 or higher at least 2 times. That means the top number is 140 or higher or the bottom number is 90 or higher, or both. ? · You think you may be having side effects from your blood pressure medicine. ? · Your blood pressure is usually normal, but it goes above normal at least 2 times. Where can you learn more? Go to http://janett-nano.info/. Enter M694 in the search box to learn more about \"High Blood Pressure: Care Instructions. \"  Current as of: September 21, 2016  Content Version: 11.4  © 9162-8167 [employer]. Care instructions adapted under license by [employer] (which disclaims liability or warranty for this information). If you have questions about a medical condition or this instruction, always ask your healthcare professional. [place] [address] any warranty or liability for your use of this information.

## 2018-05-29 NOTE — PROGRESS NOTES
Chief Complaint   Patient presents with    Hypertension     6 month follow up     1. Have you been to the ER, urgent care clinic since your last visit? Hospitalized since your last visit? No    2. Have you seen or consulted any other health care providers outside of the [address] [place] [address] since your last visit? Include any pap smears or colon screening.  No

## 2018-05-29 NOTE — MR AVS SNAPSHOT
[address] [facility] 
 
 
 [address] [place] [address] 
[phone] Patient: Chana Perry MRN: [id number] IWV:[birth year] Visit Information Date & Time Provider Department Dept. Phone Encounter #  
 5/29/2018 10:00 AM Kevin Loya  [facility] [phone] [id number] Follow-up Instructions Return in about 6 months (around 11/29/2018) for disease management. Your Appointments 11/29/2018  9:30 AM  
ROUTINE CARE with Kevin Loya  [facility] ([address]) Appt Note: 6 months follow up visit disease management [address] [address]  
[phone]  
  
   
 [address] [address] Upcoming Health Maintenance Date Due FOBT Q 1 YEAR, 18+ 5/7/1982 Influenza Age 5 to Adult 8/1/2018 BREAST CANCER SCRN MAMMOGRAM 8/2/2019 PAP AKA CERVICAL CYTOLOGY 8/1/2020 DTaP/Tdap/Td series (2 - Td) 8/4/2027 Allergies as of 5/29/2018  Review Complete On: 5/29/2018 By: April Castillo LPN No Known Allergies Current Immunizations  Never Reviewed No immunizations on file. Not reviewed this visit You Were Diagnosed With   
  
 Codes Comments Essential hypertension    -  Primary ICD-10-CM: I10 
ICD-9-CM: 401.9 Mixed anxiety and depressive disorder     ICD-10-CM: F41.8 ICD-9-CM: 300.4 Screening for osteoporosis     ICD-10-CM: Z13.820 ICD-9-CM: V82.81 Screening for skin cancer     ICD-10-CM: Z12.83 ICD-9-CM: V76.43 Vitals BP Pulse Temp Resp Height(growth percentile) Weight(growth percentile) 129/83 (BP 1 Location: Left arm, BP Patient Position: Sitting) 70 98.6 °F (37 °C) (Oral) 18 5' 1\" (1.549 m) 155 lb (70.3 kg) LMP SpO2 BMI OB Status Smoking Status 05/21/2018 (Exact Date) 97% 29.29 kg/m2 Having regular periods Never Smoker Vitals History BMI and BSA Data Body Mass Index Body Surface Area  
 29.29 kg/m 2 1.74 m 2 Preferred Pharmacy Pharmacy Name Phone [facility] [address], [facility] [phone] Your Updated Medication List  
  
   
This list is accurate as of 5/29/18 10:38 AM.  Always use your most recent med list.  
  
  
  
  
 ALPRAZolam 0.5 mg tablet Commonly known as:  [place] Take 1 Tab by mouth daily as needed for Anxiety. buPROPion  mg XL tablet Commonly known as:  WELLBUTRIN XL  
TAKE ONE TABLET BY MOUTH EVERY MORNING  
  
 hydroCHLOROthiazide 25 mg tablet Commonly known as:  HYDRODIURIL Take 1 Tab by mouth daily. For blood pressure  
  
 multivitamin tablet Commonly known as:  ONE A DAY Take 1 tablet by mouth daily. PARoxetine 40 mg tablet Commonly known as:  PAXIL Take 1 Tab by mouth daily. For depression SF 5000 PLUS 1.1 % Crea Generic drug:  fluoride (sodium) BRUSH FOR 2 MINUTES AT BEDTIME, SPIT OUT, DO NOT RINSE Prescriptions Sent to Pharmacy Refills PARoxetine (PAXIL) 40 mg tablet 1 Sig: Take 1 Tab by mouth daily. For depression Class: Normal  
 Pharmacy: [facility] [address], [address] #: [phone] Route: Oral  
  
We Performed the Following REFERRAL TO DERMATOLOGY [REF19 Custom] Follow-up Instructions Return in about 6 months (around 11/29/2018) for disease management. To-Do List   
 05/29/2018 Imaging:  DEXA BONE DENSITY STUDY AXIAL Referral Information Referral ID Referred By Referred To  
  
 [id number] Snehal Haile Not Available Visits Status Start Date End Date 1 New Request 5/29/18 5/29/19 If your referral has a status of pending review or denied, additional information will be sent to support the outcome of this decision.   
 Referral ID Referred By Referred To  
 [id number] Gabriel Manning MD  
 [address] [facility] [place] Phone: [phone] Fax: [phone] Visits Status Start Date End Date 1 New Request 5/29/18 5/29/19 If your referral has a status of pending review or denied, additional information will be sent to support the outcome of this decision. Patient Instructions High Blood Pressure: Care Instructions Your Care Instructions If your blood pressure is usually above 140/90, you have high blood pressure, or hypertension. That means the top number is 140 or higher or the bottom number is 90 or higher, or both. Despite what a lot of people think, high blood pressure usually doesn't cause headaches or make you feel dizzy or lightheaded. It usually has no symptoms. But it does increase your risk for heart attack, stroke, and kidney or eye damage. The higher your blood pressure, the more your risk increases. Your doctor will give you a goal for your blood pressure. Your goal will be based on your health and your age. An example of a goal is to keep your blood pressure below 140/90. Lifestyle changes, such as eating healthy and being active, are always important to help lower blood pressure. You might also take medicine to reach your blood pressure goal. 
Follow-up care is a key part of your treatment and safety. Be sure to make and go to all appointments, and call your doctor if you are having problems. It's also a good idea to know your test results and keep a list of the medicines you take. How can you care for yourself at home? Medical treatment · If you stop taking your medicine, your blood pressure will go back up. You may take one or more types of medicine to lower your blood pressure. Be safe with medicines. Take your medicine exactly as prescribed. Call your doctor if you think you are having a problem with your medicine. · Talk to your doctor before you start taking aspirin every day.  Aspirin can help certain people lower their risk of a heart attack or stroke. But taking aspirin isn't right for everyone, because it can cause serious bleeding. · See your doctor regularly. You may need to see the doctor more often at first or until your blood pressure comes down. · If you are taking blood pressure medicine, talk to your doctor before you take decongestants or anti-inflammatory medicine, such as ibuprofen. Some of these medicines can raise blood pressure. · Learn how to check your blood pressure at home. Lifestyle changes · Stay at a healthy weight. This is especially important if you put on weight around the waist. Losing even 10 pounds can help you lower your blood pressure. · If your doctor recommends it, get more exercise. Walking is a good choice. Bit by bit, increase the amount you walk every day. Try for at least 30 minutes on most days of the week. You also may want to swim, bike, or do other activities. · Avoid or limit alcohol. Talk to your doctor about whether you can drink any alcohol. · Try to limit how much sodium you eat to less than 2,300 milligrams (mg) a day. Your doctor may ask you to try to eat less than 1,500 mg a day. · Eat plenty of fruits (such as bananas and oranges), vegetables, legumes, whole grains, and low-fat dairy products. · Lower the amount of saturated fat in your diet. Saturated fat is found in animal products such as milk, cheese, and meat. Limiting these foods may help you lose weight and also lower your risk for heart disease. · Do not smoke. Smoking increases your risk for heart attack and stroke. If you need help quitting, talk to your doctor about stop-smoking programs and medicines. These can increase your chances of quitting for good. When should you call for help? Call 911 anytime you think you may need emergency care.  This may mean having symptoms that suggest that your blood pressure is causing a serious heart or blood vessel problem. Your blood pressure may be over 180/110. ? For example, call 911 if: 
? · You have symptoms of a heart attack. These may include: ¨ Chest pain or pressure, or a strange feeling in the chest. 
¨ Sweating. ¨ Shortness of breath. ¨ Nausea or vomiting. ¨ Pain, pressure, or a strange feeling in the back, neck, jaw, or upper belly or in one or both shoulders or arms. ¨ Lightheadedness or sudden weakness. ¨ A fast or irregular heartbeat. ? · You have symptoms of a stroke. These may include: 
¨ Sudden numbness, tingling, weakness, or loss of movement in your face, arm, or leg, especially on only one side of your body. ¨ Sudden vision changes. ¨ Sudden trouble speaking. ¨ Sudden confusion or trouble understanding simple statements. ¨ Sudden problems with walking or balance. ¨ A sudden, severe headache that is different from past headaches. ? · You have severe back or belly pain. ?Do not wait until your blood pressure comes down on its own. Get help right away. ?Call your doctor now or seek immediate care if: 
? · Your blood pressure is much higher than normal (such as 180/110 or higher), but you don't have symptoms. ? · You think high blood pressure is causing symptoms, such as: ¨ Severe headache. ¨ Blurry vision. ? Watch closely for changes in your health, and be sure to contact your doctor if: 
? · Your blood pressure measures 140/90 or higher at least 2 times. That means the top number is 140 or higher or the bottom number is 90 or higher, or both. ? · You think you may be having side effects from your blood pressure medicine. ? · Your blood pressure is usually normal, but it goes above normal at least 2 times. Where can you learn more? Go to http://janett-nano.info/. Enter W514 in the search box to learn more about \"High Blood Pressure: Care Instructions. \" Current as of: September 21, 2016 Content Version: 11.4 © 8820-3351 Healthwise, Incorporated. Care instructions adapted under license by [employer] (which disclaims liability or warranty for this information). If you have questions about a medical condition or this instruction, always ask your healthcare professional. [address] any warranty or liability for your use of this information. Introducing [facility]! Dear Florian Thornton: 
Thank you for requesting a [employer] account. Our records indicate that you have previously registered for a [employer] account but its currently inactive. Please call our [employer] support line at [phone]. Additional Information If you have questions, please visit the Frequently Asked Questions section of the [employer] website at [email or web address] Remember, [employer] is NOT to be used for urgent needs. For medical emergencies, dial 911. Now available from your iPhone and Android! Please provide this summary of care documentation to your next provider. Your primary care clinician is listed as Jose Font. If you have any questions after today's visit, please call [phone].

## 2018-05-29 NOTE — PROGRESS NOTES
[address] [facility] Note    Carlos Andrade is a 46 y.o. female who was seen in clinic today (5/29/2018). Subjective:  Cardiovascular Review:  The patient has hypertension. Diet and Lifestyle: generally follows a low fat low cholesterol diet, generally follows a low sodium diet, exercises regularly, nonsmoker  Home BP Monitoring: is well controlled at home, ranging 130/60's. Pertinent ROS: taking medications as instructed, no medication side effects noted, no TIA's, no chest pain on exertion, no dyspnea on exertion, no swelling of ankles. Patient had colonoscopy in 8/2017 with normal findings. Seen by GYN routinely. Anxiety  Patient complains of anxiety and depressive disorder, panic attacks, sleep disturbance. She has the following symptoms: insomnia, racing thoughts, psychomotor agitation, feelings of losing control, difficulty concentrating, depressed mood, anhedonia and weight loss. Onset of symptoms was approximately several years ago, gradually worsening since that time. She denies current suicidal and homicidal ideation. Treatment includes Paxil, Wellbutrin, Xanax and no other therapies. Patient reports dry mouth s/t Paxil, dentist had recommended a dosage change. Currently seen by psychiatry, Dr Alexis Allison and counselor routinely. Prior to Admission medications    Medication Sig Start Date End Date Taking? Authorizing Provider   PARoxetine (PAXIL) 40 mg tablet Take 1 Tab by mouth daily. For depression 5/29/18  Yes Kristina Strauss NP   buPROPion XL (WELLBUTRIN XL) 300 mg XL tablet TAKE ONE TABLET BY MOUTH EVERY MORNING 11/28/17  Yes Kristina Strauss NP   hydroCHLOROthiazide (HYDRODIURIL) 25 mg tablet Take 1 Tab by mouth daily. For blood pressure 11/28/17  Yes Kristina Strauss NP   ALPRAZolam Alfornia Karlie) 0.5 mg tablet Take 1 Tab by mouth daily as needed for Anxiety.  11/28/17  Yes Kristina Strauss NP   SF 5000 PLUS 1.1 % crea BRUSH FOR 2 MINUTES AT BEDTIME, SPIT OUT, DO NOT RINSE 10/6/16  Yes Historical Provider   multivitamin (ONE A DAY) tablet Take 1 tablet by mouth daily. Yes Historical Provider          No Known Allergies        ROS  See HPI    Objective:   Physical Exam   Constitutional: She is oriented to person, place, and time. She appears well-developed and well-nourished. Neck: Normal range of motion. Neck supple. No JVD present. Carotid bruit is not present. No thyromegaly present. Cardiovascular: Normal rate, regular rhythm and intact distal pulses. Exam reveals no gallop and no friction rub. No murmur heard. Pulmonary/Chest: Effort normal and breath sounds normal. No respiratory distress. Musculoskeletal: She exhibits no edema. Lymphadenopathy:     She has no cervical adenopathy. Neurological: She is alert and oriented to person, place, and time. Psychiatric: She has a normal mood and affect. Her behavior is normal.   Nursing note and vitals reviewed. Visit Vitals    /83 (BP 1 Location: Left arm, BP Patient Position: Sitting)    Pulse 70    Temp 98.6 °F (37 °C) (Oral)    Resp 18    Ht 5' 1\" (1.549 m)    Wt 155 lb (70.3 kg)    LMP 05/21/2018 (Exact Date)    SpO2 97%    BMI 29.29 kg/m2       Assessment & Plan:  Diagnoses and all orders for this visit:    1. Essential hypertension  Well controlled, no medication changes. 2. Mixed anxiety and depressive disorder  Change to dose 40 mg.   -     PARoxetine (PAXIL) 40 mg tablet; Take 1 Tab by mouth daily. For depression    3. Screening for osteoporosis  DEXA recommended given recent wrist fracture. -     DEXA BONE DENSITY STUDY AXIAL; Future    4. Screening for skin cancer  -     REFERRAL TO DERMATOLOGY      I have discussed the diagnosis with the patient and the intended plan as seen in the above orders. The patient has received an after-visit summary along with patient information handout.   I have discussed medication side effects and warnings with the patient as well. Follow-up Disposition:  Return in about 6 months (around 11/29/2018) for disease management.         Selina Fong NP

## 2018-06-01 DIAGNOSIS — F41.8 MIXED ANXIETY DEPRESSIVE DISORDER: ICD-10-CM

## 2018-06-01 RX ORDER — ALPRAZOLAM 0.5 MG/1
0.5 TABLET ORAL
Qty: 20 TAB | Refills: 0 | Status: SHIPPED | OUTPATIENT
Start: 2018-06-01 | End: 2018-11-29 | Stop reason: SDUPTHER

## 2018-06-01 NOTE — TELEPHONE ENCOUNTER
Rose Peterson called in prescription for patients xanax via     [phone] notified patient prescription was called in to pharmacy patient understand

## 2018-06-01 NOTE — TELEPHONE ENCOUNTER
Patient is calling regarding her Xanax, stating that she needs the medication by today.  She is requesting a prescription by the end of the day and a call back when its ready  Best call back [phone]  Last seen : May 29, 2018

## 2018-06-01 NOTE — TELEPHONE ENCOUNTER
Pharmacy on file verified  Requested Prescriptions     Pending Prescriptions Disp Refills    ALPRAZolam (XANAX) 0.5 mg tablet 20 Tab 0     Sig: Take 1 Tab by mouth daily as needed for Anxiety. Patient states that it has been 60 days past the written RX she was given at her last appointment, the pharmacy will not fill the RX.

## 2018-06-12 ENCOUNTER — OFFICE VISIT (OUTPATIENT)
Dept: FAMILY MEDICINE CLINIC | Age: 53
End: 2018-06-12

## 2018-06-12 VITALS
TEMPERATURE: 97.6 F | SYSTOLIC BLOOD PRESSURE: 126 MMHG | HEART RATE: 65 BPM | WEIGHT: 153 LBS | OXYGEN SATURATION: 99 % | DIASTOLIC BLOOD PRESSURE: 80 MMHG | HEIGHT: 61 IN | BODY MASS INDEX: 28.89 KG/M2 | RESPIRATION RATE: 18 BRPM

## 2018-06-12 DIAGNOSIS — F41.8 MIXED ANXIETY AND DEPRESSIVE DISORDER: Primary | ICD-10-CM

## 2018-06-12 RX ORDER — METRONIDAZOLE 500 MG/1
TABLET ORAL
COMMUNITY
Start: 2018-05-30 | End: 2018-07-05

## 2018-06-12 RX ORDER — CLONAZEPAM 0.5 MG/1
0.5 TABLET ORAL
Qty: 20 TAB | Refills: 0 | Status: SHIPPED | OUTPATIENT
Start: 2018-06-12 | End: 2018-11-29

## 2018-06-12 RX ORDER — PAROXETINE 30 MG/1
TABLET, FILM COATED ORAL
COMMUNITY
Start: 2018-05-01 | End: 2018-06-12 | Stop reason: DRUGHIGH

## 2018-06-12 RX ORDER — PAROXETINE 10 MG/1
10 TABLET, FILM COATED ORAL DAILY
Qty: 30 TAB | Refills: 0 | Status: SHIPPED | OUTPATIENT
Start: 2018-06-12 | End: 2018-07-05

## 2018-06-12 NOTE — PROGRESS NOTES
Chief Complaint   Patient presents with    Anxiety     patient states her anxiety has increased over the last 6 days      1. Have you been to the ER, urgent care clinic since your last visit? Hospitalized since your last visit? No    2. Have you seen or consulted any other health care providers outside of the [facility] since your last visit? Include any pap smears or colon screening.  No

## 2018-06-12 NOTE — PATIENT INSTRUCTIONS
Anxiety Disorder: Care Instructions  Your Care Instructions    Anxiety is a normal reaction to stress. Difficult situations can cause you to have symptoms such as sweaty palms and a nervous feeling. In an anxiety disorder, the symptoms are far more severe. Constant worry, muscle tension, trouble sleeping, nausea and diarrhea, and other symptoms can make normal daily activities difficult or impossible. These symptoms may occur for no reason, and they can affect your work, school, or social life. Medicines, counseling, and self-care can all help. Follow-up care is a key part of your treatment and safety. Be sure to make and go to all appointments, and call your doctor if you are having problems. It's also a good idea to know your test results and keep a list of the medicines you take. How can you care for yourself at home? · Take medicines exactly as directed. Call your doctor if you think you are having a problem with your medicine. · Go to your counseling sessions and follow-up appointments. · Recognize and accept your anxiety. Then, when you are in a situation that makes you anxious, say to yourself, \"This is not an emergency. I feel uncomfortable, but I am not in danger. I can keep going even if I feel anxious. \"  · Be kind to your body:  ¨ Relieve tension with exercise or a massage. ¨ Get enough rest.  ¨ Avoid alcohol, caffeine, nicotine, and illegal drugs. They can increase your anxiety level and cause sleep problems. ¨ Learn and do relaxation techniques. See below for more about these techniques. · Engage your mind. Get out and do something you enjoy. Go to a funny movie, or take a walk or hike. Plan your day. Having too much or too little to do can make you anxious. · Keep a record of your symptoms. Discuss your fears with a good friend or family member, or join a support group for people with similar problems. Talking to others sometimes relieves stress.   · Get involved in social groups, or volunteer to help others. Being alone sometimes makes things seem worse than they are. · Get at least 30 minutes of exercise on most days of the week to relieve stress. Walking is a good choice. You also may want to do other activities, such as running, swimming, cycling, or playing tennis or team sports. Relaxation techniques  Do relaxation exercises 10 to 20 minutes a day. You can play soothing, relaxing music while you do them, if you wish. · Tell others in your house that you are going to do your relaxation exercises. Ask them not to disturb you. · Find a comfortable place, away from all distractions and noise. · Lie down on your back, or sit with your back straight. · Focus on your breathing. Make it slow and steady. · Breathe in through your nose. Breathe out through either your nose or mouth. · Breathe deeply, filling up the area between your navel and your rib cage. Breathe so that your belly goes up and down. · Do not hold your breath. · Breathe like this for 5 to 10 minutes. Notice the feeling of calmness throughout your whole body. As you continue to breathe slowly and deeply, relax by doing the following for another 5 to 10 minutes:  · Tighten and relax each muscle group in your body. You can begin at your toes and work your way up to your head. · Imagine your muscle groups relaxing and becoming heavy. · Empty your mind of all thoughts. · Let yourself relax more and more deeply. · Become aware of the state of calmness that surrounds you. · When your relaxation time is over, you can bring yourself back to alertness by moving your fingers and toes and then your hands and feet and then stretching and moving your entire body. Sometimes people fall asleep during relaxation, but they usually wake up shortly afterward. · Always give yourself time to return to full alertness before you drive a car or do anything that might cause an accident if you are not fully alert.  Never play a relaxation tape while you drive a car. When should you call for help? Call 911 anytime you think you may need emergency care. For example, call if:  ? · You feel you cannot stop from hurting yourself or someone else. ? Keep the numbers for these national suicide hotlines: 1-379-268-TALK (4-545.121.4025) and 5-161-YMIHWMI (4-149.973.1909). If you or someone you know talks about suicide or feeling hopeless, get help right away. ? Watch closely for changes in your health, and be sure to contact your doctor if:  ? · You have anxiety or fear that affects your life. ? · You have symptoms of anxiety that are new or different from those you had before. Where can you learn more? Go to http://janett-nano.info/. Enter P754 in the search box to learn more about \"Anxiety Disorder: Care Instructions. \"  Current as of: May 12, 2017  Content Version: 11.4  © 4832-4876 Healthwise, Incorporated. Care instructions adapted under license by [employer] (which disclaims liability or warranty for this information). If you have questions about a medical condition or this instruction, always ask your healthcare professional. [address] any warranty or liability for your use of this information.

## 2018-06-12 NOTE — MR AVS SNAPSHOT
[address] 
 
 
 [address] 
[phone] Patient: Becky Rios MRN: [id number] NMZ:[birth year] Visit Information Date & Time Provider Department Dept. Phone Encounter #  
 6/12/2018 10:45 AM Gio Estrada  [address] [phone] [id number] Follow-up Instructions Return in about 2 weeks (around 6/26/2018) for medication management. Your Appointments 11/29/2018  9:30 AM  
ROUTINE CARE with Gio Estrada  [facility] ([facility]) Appt Note: 6 months follow up visit disease management [address] [phone]  
[phone]  
  
   
 [address] [id number] Upcoming Health Maintenance Date Due Influenza Age 5 to Adult 8/1/2018 BREAST CANCER SCRN MAMMOGRAM 8/2/2019 PAP AKA CERVICAL CYTOLOGY 8/1/2020 DTaP/Tdap/Td series (2 - Td) 8/4/2027 COLONOSCOPY 8/16/2027 Allergies as of 6/12/2018  Review Complete On: 6/12/2018 By: Truman Callahan LPN No Known Allergies Current Immunizations  Never Reviewed No immunizations on file. Not reviewed this visit You Were Diagnosed With   
  
 Codes Comments Mixed anxiety and depressive disorder    -  Primary ICD-10-CM: F41.8 ICD-9-CM: 300.4 Vitals BP Pulse Temp Resp Height(growth percentile) Weight(growth percentile) 126/80 (BP 1 Location: Left arm, BP Patient Position: Sitting) 65 97.6 °F (36.4 °C) (Oral) 18 5' 1\" (1.549 m) 153 lb (69.4 kg) LMP SpO2 BMI OB Status Smoking Status 05/21/2018 (Exact Date) 99% 28.91 kg/m2 Having regular periods Never Smoker Vitals History BMI and BSA Data Body Mass Index Body Surface Area  
 28.91 kg/m 2 1.73 m 2 Preferred Pharmacy Pharmacy Name Phone Jayce Shows [address], [address] [phone] Your Updated Medication List  
  
   
This list is accurate as of 6/12/18 11:40 AM.  Always use your most recent med list.  
  
  
  
  
 ALPRAZolam 0.5 mg tablet Commonly known as:  Darus Breeding Take 1 Tab by mouth daily as needed for Anxiety. buPROPion  mg XL tablet Commonly known as:  WELLBUTRIN XL  
TAKE ONE TABLET BY MOUTH EVERY MORNING  
  
 clonazePAM 0.5 mg tablet Commonly known as:  Erven Level Take 1 Tab by mouth daily as needed. hydroCHLOROthiazide 25 mg tablet Commonly known as:  HYDRODIURIL Take 1 Tab by mouth daily. For blood pressure  
  
 metroNIDAZOLE 500 mg tablet Commonly known as:  FLAGYL  
  
 multivitamin tablet Commonly known as:  ONE A DAY Take 1 tablet by mouth daily. * PARoxetine 40 mg tablet Commonly known as:  PAXIL Take 1 Tab by mouth daily. For depression * PARoxetine 10 mg tablet Commonly known as:  PAXIL Take 1 Tab by mouth daily. SF 5000 PLUS 1.1 % Crea Generic drug:  fluoride (sodium) BRUSH FOR 2 MINUTES AT BEDTIME, SPIT OUT, DO NOT RINSE * Notice: This list has 2 medication(s) that are the same as other medications prescribed for you. Read the directions carefully, and ask your doctor or other care provider to review them with you. Prescriptions Printed Refills  
 clonazePAM (KLONOPIN) 0.5 mg tablet 0 Sig: Take 1 Tab by mouth daily as needed. Class: Print Route: Oral  
  
Prescriptions Sent to Pharmacy Refills PARoxetine (PAXIL) 10 mg tablet 0 Sig: Take 1 Tab by mouth daily. Class: Normal  
 Pharmacy: [facility] [facility] #: [phone] Route: Oral  
  
Follow-up Instructions Return in about 2 weeks (around 6/26/2018) for medication management. To-Do List   
 06/25/2018 9:30 AM  
  Appointment with PAZ Naidu at [facility] ([phone]) Please, no calcium supplements or antacids that coat the stomach (ex: Tums, Mylanta) 24 hours prior to procedure. Maintain normal diet and medications. Dairy products are allowed. Wear an outfit with an elastic waistband (no zipper or metal snaps). Check in at registration 15min before your appointment time unless you were instructed to do otherwise. Patient Instructions Anxiety Disorder: Care Instructions Your Care Instructions Anxiety is a normal reaction to stress. Difficult situations can cause you to have symptoms such as sweaty palms and a nervous feeling. In an anxiety disorder, the symptoms are far more severe. Constant worry, muscle tension, trouble sleeping, nausea and diarrhea, and other symptoms can make normal daily activities difficult or impossible. These symptoms may occur for no reason, and they can affect your work, school, or social life. Medicines, counseling, and self-care can all help. Follow-up care is a key part of your treatment and safety. Be sure to make and go to all appointments, and call your doctor if you are having problems. It's also a good idea to know your test results and keep a list of the medicines you take. How can you care for yourself at home? · Take medicines exactly as directed. Call your doctor if you think you are having a problem with your medicine. · Go to your counseling sessions and follow-up appointments. · Recognize and accept your anxiety. Then, when you are in a situation that makes you anxious, say to yourself, \"This is not an emergency. I feel uncomfortable, but I am not in danger. I can keep going even if I feel anxious. \" · Be kind to your body: ¨ Relieve tension with exercise or a massage. ¨ Get enough rest. 
¨ Avoid alcohol, caffeine, nicotine, and illegal drugs. They can increase your anxiety level and cause sleep problems. ¨ Learn and do relaxation techniques. See below for more about these techniques. · Engage your mind. Get out and do something you enjoy. Go to a funny movie, or take a walk or hike. Plan your day. Having too much or too little to do can make you anxious. · Keep a record of your symptoms. Discuss your fears with a good friend or family member, or join a support group for people with similar problems. Talking to others sometimes relieves stress. · Get involved in social groups, or volunteer to help others. Being alone sometimes makes things seem worse than they are. · Get at least 30 minutes of exercise on most days of the week to relieve stress. Walking is a good choice. You also may want to do other activities, such as running, swimming, cycling, or playing tennis or team sports. Relaxation techniques Do relaxation exercises 10 to 20 minutes a day. You can play soothing, relaxing music while you do them, if you wish. · Tell others in your house that you are going to do your relaxation exercises. Ask them not to disturb you. · Find a comfortable place, away from all distractions and noise. · Lie down on your back, or sit with your back straight. · Focus on your breathing. Make it slow and steady. · Breathe in through your nose. Breathe out through either your nose or mouth. · Breathe deeply, filling up the area between your navel and your rib cage. Breathe so that your belly goes up and down. · Do not hold your breath. · Breathe like this for 5 to 10 minutes. Notice the feeling of calmness throughout your whole body. As you continue to breathe slowly and deeply, relax by doing the following for another 5 to 10 minutes: · Tighten and relax each muscle group in your body. You can begin at your toes and work your way up to your head. · Imagine your muscle groups relaxing and becoming heavy. · Empty your mind of all thoughts. · Let yourself relax more and more deeply. · Become aware of the state of calmness that surrounds you. · When your relaxation time is over, you can bring yourself back to alertness by moving your fingers and toes and then your hands and feet and then stretching and moving your entire body. Sometimes people fall asleep during relaxation, but they usually wake up shortly afterward. · Always give yourself time to return to full alertness before you drive a car or do anything that might cause an accident if you are not fully alert. Never play a relaxation tape while you drive a car. When should you call for help? Call 911 anytime you think you may need emergency care. For example, call if: 
? · You feel you cannot stop from hurting yourself or someone else. ? Keep the numbers for these national suicide hotlines: 5-408-864-TALK (2-579.539.1278) and 6-415-MHBWIFN (4-935.929.2094). If you or someone you know talks about suicide or feeling hopeless, get help right away. ? Watch closely for changes in your health, and be sure to contact your doctor if: 
? · You have anxiety or fear that affects your life. ? · You have symptoms of anxiety that are new or different from those you had before. Where can you learn more? Go to http://janett-nano.info/. Enter P754 in the search box to learn more about \"Anxiety Disorder: Care Instructions. \" Current as of: May 12, 2017 Content Version: 11.4 © 9341-0330 [employer]. Care instructions adapted under license by [employer] (which disclaims liability or warranty for this information). If you have questions about a medical condition or this instruction, always ask your healthcare professional. [address] any warranty or liability for your use of this information. Introducing [facility]! Darlene Sanchez introduces [employer] patient portal. Now you can access parts of your medical record, email your doctor's office, and request medication refills online.    
 
1. In your internet browser, go to [email or web address] 2. Click on the First Time User? Click Here link in the Sign In box. You will see the New Member Sign Up page. 3. Enter your [employer] Access Code exactly as it appears below. You will not need to use this code after youve completed the sign-up process. If you do not sign up before the expiration date, you must request a new code. · [employer] Access Code: [email or web address] Expires: 8/28/2018  9:46 AM 
 
4. Enter the last four digits of your Social Security Number (xxxx) and Date of Birth (mm/dd/yyyy) as indicated and click Submit. You will be taken to the next sign-up page. 5. Create a [employer] ID. This will be your [employer] login ID and cannot be changed, so think of one that is secure and easy to remember. 6. Create a [employer] password. You can change your password at any time. 7. Enter your Password Reset Question and Answer. This can be used at a later time if you forget your password. 8. Enter your e-mail address. You will receive e-mail notification when new information is available in [address] 9. Click Sign Up. You can now view and download portions of your medical record. 10. Click the Download Summary menu link to download a portable copy of your medical information. If you have questions, please visit the Frequently Asked Questions section of the [employer] website. Remember, [employer] is NOT to be used for urgent needs. For medical emergencies, dial 911. Now available from your iPhone and Android! Please provide this summary of care documentation to your next provider. Your primary care clinician is listed as Ranjith Mcmahan. If you have any questions after today's visit, please call [phone].

## 2018-06-12 NOTE — PROGRESS NOTES
[address] [facility] Note    Daniela Kelley is a 46 y.o. female who was seen in clinic today (6/12/2018). Subjective: Anxiety  Patient complains of anxiety and depressive disorder, panic attacks, sleep disturbance. She has the following symptoms: insomnia, racing thoughts, psychomotor agitation, feelings of losing control, difficulty concentrating, depressed mood, anhedonia and weight loss. Onset of symptoms was approximately several years ago, gradually worsening since that time. She denies current suicidal and homicidal ideation. Treatment includes Paxil, Wellbutrin, Xanax and no other therapies. Patient reports dry mouth s/t Paxil, dentist had recommended a dosage change. Patient currently taking Paxil 40 mg but reports her anxiety symptoms are elevated since reducing the dose. Currently seen by psychiatry, Dr Zan Barajas and counselor routinely. Prior to Admission medications    Medication Sig Start Date End Date Taking? Authorizing Provider   clonazePAM (KLONOPIN) 0.5 mg tablet Take 1 Tab by mouth daily as needed. 6/12/18  Yes Haylie Mejia NP   PARoxetine (PAXIL) 10 mg tablet Take 1 Tab by mouth daily. 6/12/18  Yes Haylie Mejia NP   ALPRAZolam Mangeo Sotelo) 0.5 mg tablet Take 1 Tab by mouth daily as needed for Anxiety. 6/1/18  Yes Haylie Mejia NP   PARoxetine (PAXIL) 40 mg tablet Take 1 Tab by mouth daily. For depression 5/29/18  Yes Haylie Mejia NP   buPROPion XL (WELLBUTRIN XL) 300 mg XL tablet TAKE ONE TABLET BY MOUTH EVERY MORNING 11/28/17  Yes Haylie Mejia NP   hydroCHLOROthiazide (HYDRODIURIL) 25 mg tablet Take 1 Tab by mouth daily. For blood pressure 11/28/17  Yes Haylie Mejia NP   SF 5000 PLUS 1.1 % crea BRUSH FOR 2 MINUTES AT BEDTIME, SPIT OUT, DO NOT RINSE 10/6/16  Yes Historical Provider   multivitamin (ONE A DAY) tablet Take 1 tablet by mouth daily.    Yes Historical Provider   metroNIDAZOLE (FLAGYL) 500 mg tablet  5/30/18 Historical Provider          No Known Allergies        ROS  See HPI    Objective:   Physical Exam   Constitutional: She is oriented to person, place, and time. She appears well-developed and well-nourished. Cardiovascular: Normal rate, regular rhythm and intact distal pulses. Exam reveals no gallop and no friction rub. No murmur heard. Pulmonary/Chest: Effort normal and breath sounds normal. No respiratory distress. Neurological: She is alert and oriented to person, place, and time. Psychiatric: Her speech is normal and behavior is normal. Judgment and thought content normal. Her mood appears anxious. She exhibits a depressed mood. Nursing note and vitals reviewed. Visit Vitals    /80 (BP 1 Location: Left arm, BP Patient Position: Sitting)    Pulse 65    Temp 97.6 °F (36.4 °C) (Oral)    Resp 18    Ht 5' 1\" (1.549 m)    Wt 153 lb (69.4 kg)    LMP 05/21/2018 (Exact Date)    SpO2 99%    BMI 28.91 kg/m2       Assessment & Plan:  Diagnoses and all orders for this visit:    1. Mixed anxiety and depressive disorder  Increas Paroxetine to 50 mg daily. Add clonazepam for sleep. Recommend CBT for negative thoughts. List of counselors provided. Go to ER for new or worsening symptoms  -     clonazePAM (KLONOPIN) 0.5 mg tablet; Take 1 Tab by mouth daily as needed. -     PARoxetine (PAXIL) 10 mg tablet; Take 1 Tab by mouth daily. I have discussed the diagnosis with the patient and the intended plan as seen in the above orders. The patient has received an after-visit summary along with patient information handout. I have discussed medication side effects and warnings with the patient as well. Follow-up Disposition:  Return in about 2 weeks (around 6/26/2018) for medication management.         Kristina Strauss NP

## 2018-06-26 ENCOUNTER — OFFICE VISIT (OUTPATIENT)
Dept: FAMILY MEDICINE CLINIC | Age: 53
End: 2018-06-26

## 2018-06-26 VITALS
DIASTOLIC BLOOD PRESSURE: 75 MMHG | HEIGHT: 61 IN | RESPIRATION RATE: 18 BRPM | HEART RATE: 62 BPM | OXYGEN SATURATION: 98 % | BODY MASS INDEX: 28.89 KG/M2 | TEMPERATURE: 98 F | SYSTOLIC BLOOD PRESSURE: 123 MMHG | WEIGHT: 153 LBS

## 2018-06-26 DIAGNOSIS — F41.8 MIXED ANXIETY AND DEPRESSIVE DISORDER: Primary | ICD-10-CM

## 2018-06-26 DIAGNOSIS — R07.9 CHEST PAIN, UNSPECIFIED TYPE: ICD-10-CM

## 2018-06-26 DIAGNOSIS — I10 ESSENTIAL HYPERTENSION: ICD-10-CM

## 2018-06-26 RX ORDER — PAROXETINE 30 MG/1
60 TABLET, FILM COATED ORAL
COMMUNITY
Start: 2018-06-07 | End: 2018-11-29 | Stop reason: SDUPTHER

## 2018-06-26 NOTE — PROGRESS NOTES
[facility] Note    Georgina Bailey is a 46 y.o. female who was seen in clinic today (6/26/2018). Subjective: Anxiety  Patient seen for follow up of anxiety and depressive disorder, panic attacks, sleep disturbance. She has the following symptoms: insomnia, racing thoughts, psychomotor agitation, feelings of losing control, difficulty concentrating, depressed mood, anhedonia and weight loss. Onset of symptoms was approximately several years ago. She denies current suicidal and homicidal ideation. Treatment includes Paxil, Wellbutrin, Xanax and no other therapies. Patient currently taking Paxil 50 mg and will increase to 60 mg this week. Currently seen by psychiatry, Dr Anjel Figueroa and counselor routinely. Chest Pain  Patient complains of chest pain. Onset was a few months ago, with stable course since that time. The patient admits to chest discomfort that is intermittent described as sharp left sternal border without radiation. Pain rated as a scale of 5/10 in intensity. Associated symptoms are none. Aggravating factors are emotional stress. Alleviating factors are: nothing. Appointment with cardiology scheduled in July. Prior to Admission medications    Medication Sig Start Date End Date Taking? Authorizing Provider   PARoxetine (PAXIL) 30 mg tablet  6/7/18  Yes Historical Provider   clonazePAM (KLONOPIN) 0.5 mg tablet Take 1 Tab by mouth daily as needed. 6/12/18  Yes Brittaney Chong NP   ALPRAZolam Kemar Clarissa) 0.5 mg tablet Take 1 Tab by mouth daily as needed for Anxiety. 6/1/18  Yes Brittaney Chong NP   buPROPion XL (WELLBUTRIN XL) 300 mg XL tablet TAKE ONE TABLET BY MOUTH EVERY MORNING 11/28/17  Yes Brittaney Chong NP   hydroCHLOROthiazide (HYDRODIURIL) 25 mg tablet Take 1 Tab by mouth daily.  For blood pressure 11/28/17  Yes Brittaney Chong NP   SF 5000 PLUS 1.1 % crea BRUSH FOR 2 MINUTES AT BEDTIME, SPIT OUT, DO NOT RINSE 10/6/16  Yes Historical Provider multivitamin (ONE A DAY) tablet Take 1 tablet by mouth daily. Yes Historical Provider   metroNIDAZOLE (FLAGYL) 500 mg tablet  5/30/18   Historical Provider   PARoxetine (PAXIL) 10 mg tablet Take 1 Tab by mouth daily. 6/12/18   Stevan Meng NP   PARoxetine (PAXIL) 40 mg tablet Take 1 Tab by mouth daily. For depression 5/29/18   Stevan Meng NP          No Known Allergies        ROS  See HPI    Objective:   Physical Exam   Constitutional: She is oriented to person, place, and time. She appears well-developed and well-nourished. Cardiovascular: Normal rate, regular rhythm and intact distal pulses. Exam reveals no gallop and no friction rub. No murmur heard. Pulmonary/Chest: Effort normal and breath sounds normal. No respiratory distress. Neurological: She is alert and oriented to person, place, and time. Psychiatric: She has a normal mood and affect. Her behavior is normal. Judgment and thought content normal.   Nursing note and vitals reviewed. Visit Vitals    /75 (BP 1 Location: Left arm, BP Patient Position: Sitting)    Pulse 62    Temp 98 °F (36.7 °C) (Oral)    Resp 18    Ht 5' 1\" (1.549 m)    Wt 153 lb (69.4 kg)    LMP 06/18/2018 (Exact Date)    SpO2 98%    BMI 28.91 kg/m2       Assessment & Plan:  Diagnoses and all orders for this visit:    1. Mixed anxiety and depressive disorder  Improved, no changes to current therapy. Discussed work-life balance. 2. Chest pain, unspecified type  Atypical symptoms with no acute changes on exam. Follow up with cardiology as scheduled but symptoms likely stress mediated. -     AMB POC EKG ROUTINE W/ 12 LEADS, INTER & REP    3. Essential hypertension  Well controlled, no medication changes.  -     METABOLIC PANEL, BASIC  -     TSH AND FREE T4      I have discussed the diagnosis with the patient and the intended plan as seen in the above orders.   The patient has received an after-visit summary along with patient information handout. I have discussed medication side effects and warnings with the patient as well. Follow-up Disposition:  Return in about 6 months (around 12/26/2018) for blood pressure.         Ellie Ortega NP

## 2018-06-26 NOTE — PROGRESS NOTES
Chief Complaint   Patient presents with    Medication Evaluation     2 week follow up     1. Have you been to the ER, urgent care clinic since your last visit? Hospitalized since your last visit? No    2. Have you seen or consulted any other health care providers outside of the [address] [place] [address] since your last visit? Include any pap smears or colon screening.  No

## 2018-06-26 NOTE — PATIENT INSTRUCTIONS
Anxiety Disorder: Care Instructions  Your Care Instructions    Anxiety is a normal reaction to stress. Difficult situations can cause you to have symptoms such as sweaty palms and a nervous feeling. In an anxiety disorder, the symptoms are far more severe. Constant worry, muscle tension, trouble sleeping, nausea and diarrhea, and other symptoms can make normal daily activities difficult or impossible. These symptoms may occur for no reason, and they can affect your work, school, or social life. Medicines, counseling, and self-care can all help. Follow-up care is a key part of your treatment and safety. Be sure to make and go to all appointments, and call your doctor if you are having problems. It's also a good idea to know your test results and keep a list of the medicines you take. How can you care for yourself at home? · Take medicines exactly as directed. Call your doctor if you think you are having a problem with your medicine. · Go to your counseling sessions and follow-up appointments. · Recognize and accept your anxiety. Then, when you are in a situation that makes you anxious, say to yourself, \"This is not an emergency. I feel uncomfortable, but I am not in danger. I can keep going even if I feel anxious. \"  · Be kind to your body:  ¨ Relieve tension with exercise or a massage. ¨ Get enough rest.  ¨ Avoid alcohol, caffeine, nicotine, and illegal drugs. They can increase your anxiety level and cause sleep problems. ¨ Learn and do relaxation techniques. See below for more about these techniques. · Engage your mind. Get out and do something you enjoy. Go to a funny movie, or take a walk or hike. Plan your day. Having too much or too little to do can make you anxious. · Keep a record of your symptoms. Discuss your fears with a good friend or family member, or join a support group for people with similar problems. Talking to others sometimes relieves stress.   · Get involved in social groups, or volunteer to help others. Being alone sometimes makes things seem worse than they are. · Get at least 30 minutes of exercise on most days of the week to relieve stress. Walking is a good choice. You also may want to do other activities, such as running, swimming, cycling, or playing tennis or team sports. Relaxation techniques  Do relaxation exercises 10 to 20 minutes a day. You can play soothing, relaxing music while you do them, if you wish. · Tell others in your house that you are going to do your relaxation exercises. Ask them not to disturb you. · Find a comfortable place, away from all distractions and noise. · Lie down on your back, or sit with your back straight. · Focus on your breathing. Make it slow and steady. · Breathe in through your nose. Breathe out through either your nose or mouth. · Breathe deeply, filling up the area between your navel and your rib cage. Breathe so that your belly goes up and down. · Do not hold your breath. · Breathe like this for 5 to 10 minutes. Notice the feeling of calmness throughout your whole body. As you continue to breathe slowly and deeply, relax by doing the following for another 5 to 10 minutes:  · Tighten and relax each muscle group in your body. You can begin at your toes and work your way up to your head. · Imagine your muscle groups relaxing and becoming heavy. · Empty your mind of all thoughts. · Let yourself relax more and more deeply. · Become aware of the state of calmness that surrounds you. · When your relaxation time is over, you can bring yourself back to alertness by moving your fingers and toes and then your hands and feet and then stretching and moving your entire body. Sometimes people fall asleep during relaxation, but they usually wake up shortly afterward. · Always give yourself time to return to full alertness before you drive a car or do anything that might cause an accident if you are not fully alert.  Never play a relaxation tape while you drive a car. When should you call for help? Call 911 anytime you think you may need emergency care. For example, call if:  ? · You feel you cannot stop from hurting yourself or someone else. ? Keep the numbers for these national suicide hotlines: 7-380-166-TALK (5-250.404.4246) and 5-233-GSIOLUP (6-190.322.5181). If you or someone you know talks about suicide or feeling hopeless, get help right away. ? Watch closely for changes in your health, and be sure to contact your doctor if:  ? · You have anxiety or fear that affects your life. ? · You have symptoms of anxiety that are new or different from those you had before. Where can you learn more? Go to http://janett-nano.info/. Enter P754 in the search box to learn more about \"Anxiety Disorder: Care Instructions. \"  Current as of: May 12, 2017  Content Version: 11.4  © 9363-6888 Healthwise, Incorporated. Care instructions adapted under license by [employer] (which disclaims liability or warranty for this information). If you have questions about a medical condition or this instruction, always ask your healthcare professional. [address] any warranty or liability for your use of this information.

## 2018-06-27 LAB
BUN SERPL-MCNC: 18 MG/DL (ref 6–24)
BUN/CREAT SERPL: 21 (ref 9–23)
CALCIUM SERPL-MCNC: 9.6 MG/DL (ref 8.7–10.2)
CHLORIDE SERPL-SCNC: 98 MMOL/L (ref 96–106)
CO2 SERPL-SCNC: 26 MMOL/L (ref 20–29)
CREAT SERPL-MCNC: 0.85 MG/DL (ref 0.57–1)
GLUCOSE SERPL-MCNC: 80 MG/DL (ref 65–99)
POTASSIUM SERPL-SCNC: 4.6 MMOL/L (ref 3.5–5.2)
SODIUM SERPL-SCNC: 138 MMOL/L (ref 134–144)
T4 FREE SERPL-MCNC: 1.17 NG/DL (ref 0.82–1.77)
TSH SERPL DL<=0.005 MIU/L-ACNC: 1.51 UIU/ML (ref 0.45–4.5)

## 2018-07-05 ENCOUNTER — OFFICE VISIT (OUTPATIENT)
Dept: CARDIOLOGY CLINIC | Age: 53
End: 2018-07-05

## 2018-07-05 VITALS
HEART RATE: 66 BPM | SYSTOLIC BLOOD PRESSURE: 124 MMHG | BODY MASS INDEX: 28.62 KG/M2 | WEIGHT: 151.6 LBS | HEIGHT: 61 IN | RESPIRATION RATE: 16 BRPM | DIASTOLIC BLOOD PRESSURE: 80 MMHG

## 2018-07-05 DIAGNOSIS — Z82.49 FAMILY HISTORY OF AORTIC VALVE DISORDER: ICD-10-CM

## 2018-07-05 DIAGNOSIS — R06.89 DYSPNEA AND RESPIRATORY ABNORMALITIES: ICD-10-CM

## 2018-07-05 DIAGNOSIS — R07.9 CHEST PAIN, UNSPECIFIED TYPE: Primary | ICD-10-CM

## 2018-07-05 DIAGNOSIS — I10 ESSENTIAL HYPERTENSION: ICD-10-CM

## 2018-07-05 DIAGNOSIS — R06.00 DYSPNEA AND RESPIRATORY ABNORMALITIES: ICD-10-CM

## 2018-07-05 NOTE — MR AVS SNAPSHOT
[address] [facility] [address] [facility] [address] 
[phone] Patient: Leon Burden MRN: [id number] ITV:[id number] Visit Information Date & Time Provider Department Dept. Phone Encounter #  
 7/5/2018  9:00 AM Devante Landrum MD [facility] Brian Marquez [phone] [id number] Your Appointments 7/12/2018 11:00 AM  
ECHO CARDIOGRAMS 2D with ECHO, [facility] (SAMMY SCHEDULING) Appt Note: stress echo for chest pain per [address] [place]  
[address] [facility]  
  
    
 7/12/2018 11:00 AM  
STRESS ECHOCARDIOGRAMS with WINNIE JEFFERSON [facility] (SAMMY SCHEDULING) Appt Note: stress echo for chest pain per [address] [facility] [address]  
[address] [facility] [address]  
  
    
 11/29/2018  9:30 AM  
ROUTINE CARE with Edwin Calixto  [facility] ([address] [facility]) Appt Note: 6 months follow up visit disease management [address] [address]  
[phone]  
  
   
 [address] [address] Upcoming Health Maintenance Date Due Influenza Age 5 to Adult 8/1/2018 BREAST CANCER SCRN MAMMOGRAM 8/2/2019 PAP AKA CERVICAL CYTOLOGY 8/1/2020 DTaP/Tdap/Td series (2 - Td) 8/4/2027 COLONOSCOPY 8/16/2027 Allergies as of 7/5/2018  Review Complete On: 7/5/2018 By: Elisa Buckley No Known Allergies Current Immunizations  Never Reviewed No immunizations on file. Not reviewed this visit You Were Diagnosed With   
  
 Codes Comments Chest pain, unspecified type    -  Primary ICD-10-CM: R07.9 ICD-9-CM: 786.50 Essential hypertension     ICD-10-CM: I10 
ICD-9-CM: 401.9 Family history of aortic valve disorder     ICD-10-CM: Z82.49 
ICD-9-CM: V17.49 Vitals BP Pulse Resp Height(growth percentile) Weight(growth percentile) LMP  
 124/80 (BP 1 Location: Right arm, BP Patient Position: Sitting) 66 16 5' 1\" (1.549 m) 151 lb 9.6 oz (68.8 kg) 06/18/2018 (Exact Date) BMI OB Status Smoking Status 28.64 kg/m2 Having regular periods Never Smoker Vitals History BMI and BSA Data Body Mass Index Body Surface Area  
 28.64 kg/m 2 1.72 m 2 Preferred Pharmacy Pharmacy Name Phone [facility] [address], [address] [place] [address] Avenue [phone] Your Updated Medication List  
  
   
This list is accurate as of 7/5/18  9:14 AM.  Always use your most recent med list.  
  
  
  
  
 ALPRAZolam 0.5 mg tablet Commonly known as:  Brian Beecham Take 1 Tab by mouth daily as needed for Anxiety. buPROPion  mg XL tablet Commonly known as:  WELLBUTRIN XL  
TAKE ONE TABLET BY MOUTH EVERY MORNING  
  
 clonazePAM 0.5 mg tablet Commonly known as:  Iman Genaro Take 1 Tab by mouth daily as needed. hydroCHLOROthiazide 25 mg tablet Commonly known as:  HYDRODIURIL Take 1 Tab by mouth daily. For blood pressure  
  
 multivitamin tablet Commonly known as:  ONE A DAY Take 1 tablet by mouth daily. PARoxetine 30 mg tablet Commonly known as:  PAXIL Take 60 mg by mouth daily. SF 5000 PLUS 1.1 % Crea Generic drug:  fluoride (sodium) BRUSH FOR 2 MINUTES AT BEDTIME, SPIT OUT, DO NOT RINSE To-Do List   
 07/05/2018 ECHO:  ECHO TTE STRESS COMP W OR WO CONTR   
  
 07/31/2018 9:30 AM  
  Appointment with BSI DEXA 1 at [facility] ([phone]) Please, no calcium supplements or antacids that coat the stomach (ex: Tums, Mylanta) 24 hours prior to procedure. Maintain normal diet and medications. Dairy products are allowed.   Wear an outfit with an elastic waistband (no zipper or metal snaps). Check in at registration 15min before your appointment time unless you were instructed to do otherwise. Patient Instructions If your stress test is ok, please consider having a coronary calcium scan (heart scan) done to look for evidence of early plaque in the arteries of your heart. You should call [phone] to schedule this. This test is not covered by insurance and will cost you approximately $99 this month. Please let the imaging center know that you are a patient of Dr. Carlos Golden so they can send her the results. Introducing [facility]! [employer] introduces [employer] patient portal. Now you can access parts of your medical record, email your doctor's office, and request medication refills online. 1. In your internet browser, go to [email or web address] 2. Click on the First Time User? Click Here link in the Sign In box. You will see the New Member Sign Up page. 3. Enter your BrandProject Access Code exactly as it appears below. You will not need to use this code after youve completed the sign-up process. If you do not sign up before the expiration date, you must request a new code. · BrandProject Access Code: [email or web address] Expires: 8/28/2018  9:46 AM 
 
4. Enter the last four digits of your Social Security Number (xxxx) and Date of Birth (mm/dd/yyyy) as indicated and click Submit. You will be taken to the next sign-up page. 5. Create a BrandProject ID. This will be your BrandProject login ID and cannot be changed, so think of one that is secure and easy to remember. 6. Create a BrandProject password. You can change your password at any time. 7. Enter your Password Reset Question and Answer. This can be used at a later time if you forget your password. 8. Enter your e-mail address. You will receive e-mail notification when new information is available in [address] 9. Click Sign Up. You can now view and download portions of your medical record. 10. Click the Download Summary menu link to download a portable copy of your medical information. If you have questions, please visit the Frequently Asked Questions section of the [employer] website. Remember, Datapipe is NOT to be used for urgent needs. For medical emergencies, dial 911. Now available from your iPhone and Android! Please provide this summary of care documentation to your next provider. Your primary care clinician is listed as Sally Stanton. If you have any questions after today's visit, please call [phone].

## 2018-07-05 NOTE — PATIENT INSTRUCTIONS
If your stress test is ok, please consider having a coronary calcium scan (heart scan) done to look for evidence of early plaque in the arteries of your heart. You should call [phone] to schedule this. This test is not covered by insurance and will cost you approximately $99 this month. Please let the imaging center know that you are a patient of Dr. Harvey Ortiz so they can send her the results.

## 2018-07-05 NOTE — PROGRESS NOTES
[facility]  [phone]    HPI: Rena Ontiveros, a 46y.o. year-old who presents for evaluation of chest pain. Having some left sided chest pain, episodes can last a couple of minutes and usually occur at rest, gets winded running down the soccer field. Had a fall and broke her wrist, playing soccer, she does not clearly remember event but htinks it was a trip, no definite LOC  She does not have any associated symptoms  She does not get chest pain with activity  She had a stress test that was ok many years ago  No palpitations, dizziness, syncope   No CHERRY or PND  No LE edema   She does some weight training with cardio 2 times/week and plays co-ed soccer once/week   Her mother is a patient of mine and she is here today as well, she is concerned that her daughter may have aortic valve insufficiency as well and would like for her to have an evaluation    Assessment/Plan:  1. Chest pain - will proceed with stress echo to assess for ischemia, recommended a coronary calcium scan to look for evidence of early plaque in the arteries of her heart if her stress test is negative, told her that if her coronary calcium scan showed any plaque we would recommend ASA and statin therapy  -lipids at goal in 11/17  2. HTN - well controlled on HCTZ  -took Atenolol in the past but cannot remember why it was changed to HCTZ  3. Anxiety/Depression - followed by PCP/psych, encouraged her to exercise regularly for stress reduction  4. Fam hx of aortic insufficiency in mother - will check aortic valve with stress echo    Soc Hx: no tobacco use   Fam Hx: no early CAD in the family    She  has a past medical history of Anxiety; Depression; Hearing aid worn; and Herpes (1990). Cardiovascular ROS: positive for chest pain, no dyspnea on exertion  Respiratory ROS: no cough, shortness of breath, or wheezing  Neurological ROS: no TIA or stroke symptoms  All other systems negative except as above. PE  Vitals:    18 0847   BP: 124/80   Pulse: 66   Resp: 16   Weight: 151 lb 9.6 oz (68.8 kg)   Height: 5' 1\" (1.549 m)    Body mass index is 28.64 kg/(m^2).    General appearance - alert, well appearing, and in no distress  Mental status - affect appropriate to mood  Eyes - sclera anicteric, moist mucous membranes  Neck - supple  Lymphatics - not assessed  Chest - clear to auscultation, no wheezes, rales or rhonchi  Heart - normal rate, regular rhythm, normal S1, S2, no murmurs, rubs, clicks or gallops  Abdomen - soft, nontender, nondistended, no masses or organomegaly  Back exam - full range of motion, no tenderness  Neurological - cranial nerves II through XII grossly intact, no focal deficit  Musculoskeletal - no muscular tenderness noted, normal strength  Extremities - peripheral pulses normal, no pedal edema  Skin - normal coloration  no rashes    12 lead EC18 - sinus bradycardia VR 51bpm, non-specific ST-T wave changes    Recent Labs:  Lab Results   Component Value Date/Time    Cholesterol, total 173 2017 10:34 AM    HDL Cholesterol 75 2017 10:34 AM    LDL, calculated 89 2017 10:34 AM    Triglyceride 47 2017 10:34 AM     Lab Results   Component Value Date/Time    Creatinine 0.85 2018 11:49 AM     Lab Results   Component Value Date/Time    BUN 18 2018 11:49 AM     Lab Results   Component Value Date/Time    Potassium 4.6 2018 11:49 AM     Lab Results   Component Value Date/Time    Hemoglobin A1c 5.5 2016 10:05 AM     Lab Results   Component Value Date/Time    HGB 12.9 2017 10:34 AM     Lab Results   Component Value Date/Time    PLATELET 859  10:34 AM       Reviewed:  Past Medical History:   Diagnosis Date    Anxiety     Depression     Hearing aid worn     Bilateral    Herpes      History   Smoking Status    Never Smoker   Smokeless Tobacco    Never Used     History   Alcohol Use No     No Known Allergies    Current Outpatient Prescriptions   Medication Sig    PARoxetine (PAXIL) 30 mg tablet Take 60 mg by mouth daily.  clonazePAM (KLONOPIN) 0.5 mg tablet Take 1 Tab by mouth daily as needed.  ALPRAZolam (XANAX) 0.5 mg tablet Take 1 Tab by mouth daily as needed for Anxiety.  buPROPion XL (WELLBUTRIN XL) 300 mg XL tablet TAKE ONE TABLET BY MOUTH EVERY MORNING    hydroCHLOROthiazide (HYDRODIURIL) 25 mg tablet Take 1 Tab by mouth daily. For blood pressure    SF 5000 PLUS 1.1 % crea BRUSH FOR 2 MINUTES AT BEDTIME, SPIT OUT, DO NOT RINSE    multivitamin (ONE A DAY) tablet Take 1 tablet by mouth daily. No current facility-administered medications for this visit.         Violeta Downs MD  [facility] [address] [facility] [place] [address] [address]  Jerome Reynolds  [phone]

## 2018-07-11 ENCOUNTER — TELEPHONE (OUTPATIENT)
Dept: CARDIOLOGY CLINIC | Age: 53
End: 2018-07-11

## 2018-07-11 NOTE — TELEPHONE ENCOUNTER
Called [facility]. Stress Echo denied. Case # [id number]. No prior auth required for a routine stress EKG. Testing has been changed per Dr. Fidel Allen. Patient aware. 2 pt identifiers used    Encouraged to proceed with Ca score as well.

## 2018-07-11 NOTE — TELEPHONE ENCOUNTER
Good Morning,    Patient's stress echo has been denied by the insurance company. To speak with a representative, please call [phone]. Please call patient and advise.     Thanks,  Eladio Cottrell

## 2018-07-12 ENCOUNTER — TELEPHONE (OUTPATIENT)
Dept: CARDIOLOGY CLINIC | Age: 53
End: 2018-07-12

## 2018-07-12 ENCOUNTER — CLINICAL SUPPORT (OUTPATIENT)
Dept: CARDIOLOGY CLINIC | Age: 53
End: 2018-07-12

## 2018-07-12 DIAGNOSIS — R06.02 SHORTNESS OF BREATH: ICD-10-CM

## 2018-07-12 DIAGNOSIS — R07.9 CHEST PAIN, UNSPECIFIED TYPE: Primary | ICD-10-CM

## 2018-07-12 NOTE — TELEPHONE ENCOUNTER
----- Message from Elmer Whtie RN sent at 7/12/2018  3:12 PM EDT -----      ----- Message -----     From: Judy Marshall MD     Sent: 7/12/2018   2:05 PM       To: Elmer White RN    No additional comment      Impression:   1.  Exercise capacity is excellent. 2.  No angina with exercise.   3. Normal exercise stress test.           Interpreting Physician:   Judy Marshall MD   7/12/2018  Electronically signed

## 2018-07-12 NOTE — TELEPHONE ENCOUNTER
Spoke to patient and advised her that her exercise stress test is normal, advised patient to follow through with coronary calcium score. Patient verbalized understanding. Patient id verified x2.

## 2018-07-31 ENCOUNTER — HOSPITAL ENCOUNTER (OUTPATIENT)
Dept: MAMMOGRAPHY | Age: 53
Discharge: HOME | End: 2018-07-31
Payer: COMMERCIAL

## 2018-07-31 DIAGNOSIS — Z13.820 SCREENING FOR OSTEOPOROSIS: ICD-10-CM

## 2018-07-31 PROCEDURE — 77080 DXA BONE DENSITY AXIAL: CPT

## 2018-08-02 NOTE — PROGRESS NOTES
[phone] attempted to call patient no answer left message to call me back in regards to her bone density results

## 2018-08-02 NOTE — PROGRESS NOTES
Patient's bone density scan was normal. There was no evidence of osteoporosis. This test can be repeated every 2 years.

## 2018-10-13 NOTE — MR AVS SNAPSHOT
Visit Information Date & Time Provider Department Dept. Phone Encounter #  
 8/4/2017  2:45 PM Jose Santos  [facility] [phone] [id number] Follow-up Instructions Return if symptoms worsen or fail to improve. Your Appointments 11/28/2017  9:30 AM  
ROUTINE CARE with Jose Santos  [address] ([facility]) Appt Note: 6 months follow up visit [address] [id number]  
[phone]  
  
   
 [address] [id number] Upcoming Health Maintenance Date Due FOBT Q 1 YEAR, 18+ 5/7/1982 BREAST CANCER SCRN MAMMOGRAM 8/1/2019 PAP AKA CERVICAL CYTOLOGY 8/1/2020 DTaP/Tdap/Td series (2 - Td) 8/4/2027 Allergies as of 8/4/2017  Review Complete On: 5/30/2017 By: Jose Santos NP No Known Allergies Current Immunizations  Never Reviewed No immunizations on file. Not reviewed this visit You Were Diagnosed With   
  
 Codes Comments Mixed anxiety and depressive disorder    -  Primary ICD-10-CM: F41.8 ICD-9-CM: 300.4 Vitals BP Pulse Temp Resp Height(growth percentile) Weight(growth percentile) 136/86 (BP 1 Location: Right arm, BP Patient Position: Sitting) 72 98.1 °F (36.7 °C) (Oral) 12 5' 1\" (1.549 m) 151 lb 3.2 oz (68.6 kg) LMP SpO2 BMI OB Status Smoking Status 07/20/2017 (Exact Date) 99% 28.57 kg/m2 Having regular periods Never Smoker Vitals History BMI and BSA Data Body Mass Index Body Surface Area 28.57 kg/m 2 1.72 m 2 Preferred Pharmacy Pharmacy Name Phone [facility], [address] [place] [address] [phone] Your Updated Medication List  
  
   
This list is accurate as of: 8/4/17  4:02 PM.  Always use your most recent med list.  
  
  
  
  
 ALPRAZolam 0.5 mg tablet Commonly known as:  Shawnjesús Agudelo Take 1 Tab by mouth daily as needed for Anxiety. buPROPion  mg XL tablet Commonly known as:  WELLBUTRIN XL  
TAKE ONE TABLET BY MOUTH EVERY MORNING  
  
 hydroCHLOROthiazide 25 mg tablet Commonly known as:  HYDRODIURIL Take 1 Tab by mouth daily. For blood pressure  
  
 multivitamin tablet Commonly known as:  ONE A DAY Take 1 tablet by mouth daily. PARoxetine 30 mg tablet Commonly known as:  PAXIL Take 2 Tabs by mouth daily. SF 5000 PLUS 1.1 % Crea Generic drug:  fluoride (sodium) BRUSH FOR 2 MINUTES AT BEDTIME, SPIT OUT, DO NOT RINSE Follow-up Instructions Return if symptoms worsen or fail to improve. Patient Instructions Anxiety Disorder: Care Instructions Your Care Instructions Anxiety is a normal reaction to stress. Difficult situations can cause you to have symptoms such as sweaty palms and a nervous feeling. In an anxiety disorder, the symptoms are far more severe. Constant worry, muscle tension, trouble sleeping, nausea and diarrhea, and other symptoms can make normal daily activities difficult or impossible. These symptoms may occur for no reason, and they can affect your work, school, or social life. Medicines, counseling, and self-care can all help. Follow-up care is a key part of your treatment and safety. Be sure to make and go to all appointments, and call your doctor if you are having problems. It's also a good idea to know your test results and keep a list of the medicines you take. How can you care for yourself at home? · Take medicines exactly as directed. Call your doctor if you think you are having a problem with your medicine. · Go to your counseling sessions and follow-up appointments. · Recognize and accept your anxiety. Then, when you are in a situation that makes you anxious, say to yourself, \"This is not an emergency. I feel uncomfortable, but I am not in danger. I can keep going even if I feel anxious. \" · Be kind to your body: ¨ Relieve tension with exercise or a massage. ¨ Get enough rest. 
¨ Avoid alcohol, caffeine, nicotine, and illegal drugs. They can increase your anxiety level and cause sleep problems. ¨ Learn and do relaxation techniques. See below for more about these techniques. · Engage your mind. Get out and do something you enjoy. Go to a funny movie, or take a walk or hike. Plan your day. Having too much or too little to do can make you anxious. · Keep a record of your symptoms. Discuss your fears with a good friend or family member, or join a support group for people with similar problems. Talking to others sometimes relieves stress. · Get involved in social groups, or volunteer to help others. Being alone sometimes makes things seem worse than they are. · Get at least 30 minutes of exercise on most days of the week to relieve stress. Walking is a good choice. You also may want to do other activities, such as running, swimming, cycling, or playing tennis or team sports. Relaxation techniques Do relaxation exercises 10 to 20 minutes a day. You can play soothing, relaxing music while you do them, if you wish. · Tell others in your house that you are going to do your relaxation exercises. Ask them not to disturb you. · Find a comfortable place, away from all distractions and noise. · Lie down on your back, or sit with your back straight. · Focus on your breathing. Make it slow and steady. · Breathe in through your nose. Breathe out through either your nose or mouth. · Breathe deeply, filling up the area between your navel and your rib cage. Breathe so that your belly goes up and down. · Do not hold your breath. · Breathe like this for 5 to 10 minutes. Notice the feeling of calmness throughout your whole body. As you continue to breathe slowly and deeply, relax by doing the following for another 5 to 10 minutes: · Tighten and relax each muscle group in your body.  You can begin at your toes and work your way up to your head. · Imagine your muscle groups relaxing and becoming heavy. · Empty your mind of all thoughts. · Let yourself relax more and more deeply. · Become aware of the state of calmness that surrounds you. · When your relaxation time is over, you can bring yourself back to alertness by moving your fingers and toes and then your hands and feet and then stretching and moving your entire body. Sometimes people fall asleep during relaxation, but they usually wake up shortly afterward. · Always give yourself time to return to full alertness before you drive a car or do anything that might cause an accident if you are not fully alert. Never play a relaxation tape while you drive a car. When should you call for help? Call 911 anytime you think you may need emergency care. For example, call if: 
· You feel you cannot stop from hurting yourself or someone else. Keep the numbers for these national suicide hotlines: 3-270-529-TALK (6-270.568.5269) and 6-064-CACFPZB (4-795.839.2360). If you or someone you know talks about suicide or feeling hopeless, get help right away. Watch closely for changes in your health, and be sure to contact your doctor if: 
· You have anxiety or fear that affects your life. · You have symptoms of anxiety that are new or different from those you had before. Where can you learn more? Go to http://janett-nano.info/. Enter P754 in the search box to learn more about \"Anxiety Disorder: Care Instructions. \" Current as of: July 26, 2016 Content Version: 11.3 © 9114-4439 [employer]. Care instructions adapted under license by [employer] (which disclaims liability or warranty for this information). If you have questions about a medical condition or this instruction, always ask your healthcare professional. [address] any warranty or liability for your use of this information. Introducing \[facility]\"" & HEALTH SERVICES! Dear Bogdan London: 
Thank you for requesting a hiogi account. Our records indicate that you have previously registered for a hiogi account but its currently inactive. Please call our hiogi support line at [phone]. Additional Information If you have questions, please visit the Frequently Asked Questions section of the hiogi website at [email or web address] Remember, hiogi is NOT to be used for urgent needs. For medical emergencies, dial 911. Now available from your iPhone and Android! Please provide this summary of care documentation to your next provider. Your primary care clinician is listed as Gio Estrada. If you have any questions after today's visit, please call [phone]. no

## 2018-10-30 ENCOUNTER — OFFICE VISIT (OUTPATIENT)
Dept: FAMILY MEDICINE CLINIC | Age: 53
End: 2018-10-30

## 2018-10-30 VITALS
TEMPERATURE: 98.3 F | DIASTOLIC BLOOD PRESSURE: 86 MMHG | HEIGHT: 61 IN | HEART RATE: 68 BPM | SYSTOLIC BLOOD PRESSURE: 132 MMHG | WEIGHT: 157 LBS | OXYGEN SATURATION: 98 % | RESPIRATION RATE: 18 BRPM | BODY MASS INDEX: 29.64 KG/M2

## 2018-10-30 DIAGNOSIS — M25.511 ACUTE PAIN OF RIGHT SHOULDER: Primary | ICD-10-CM

## 2018-10-30 NOTE — PROGRESS NOTES
Chief Complaint   Patient presents with    Shoulder Pain     right x1 month worsened this weekend     1. Have you been to the ER, urgent care clinic since your last visit? Hospitalized since your last visit?no    2. Have you seen or consulted any other health care providers outside of the [address] [place] [address] since your last visit? Include any pap smears or colon screening. Had annual OBGYN appt.  10/30/18 pap and mammo completed

## 2018-10-30 NOTE — PATIENT INSTRUCTIONS
Shoulder Bursitis: Exercises  Your Care Instructions  Here are some examples of typical rehabilitation exercises for your condition. Start each exercise slowly. Ease off the exercise if you start to have pain. Your doctor or physical therapist will tell you when you can start these exercises and which ones will work best for you. How to do the exercises  Posterior stretching exercise    1. Hold the elbow of your injured arm with your other hand. 2. Use your hand to pull your injured arm gently up and across your body. You will feel a gentle stretch across the back of your injured shoulder. 3. Hold for at least 15 to 30 seconds. Then slowly lower your arm. 4. Repeat 2 to 4 times. Up-the-back stretch    1. Put your hand in your back pocket. Let it rest there to stretch your shoulder. 2. With your other hand, hold your injured arm (palm outward) behind your back by the wrist. Pull your arm up gently to stretch your shoulder. 3. Next, put a towel over your other shoulder. Put the hand of your injured arm behind your back. Now hold the back end of the towel. With the other hand, hold the front end of the towel in front of your body. Pull gently on the front end of the towel. This will bring your hand farther up your back to stretch your shoulder. Overhead stretch    1. Standing about an arm's length away, grasp onto a solid surface. You could use a countertop, a doorknob, or the back of a sturdy chair. 2. With your knees slightly bent, bend forward with your arms straight. Lower your upper body, and let your shoulders stretch. 3. As your shoulders are able to stretch farther, you may need to take a step or two backward. 4. Hold for at least 15 to 30 seconds. Then stand up and relax. If you had stepped back during your stretch, step forward so you can keep your hands on the solid surface. 5. Repeat 2 to 4 times. Shoulder flexion (lying down)    1. Lie on your back, holding a wand with both hands. Your palms should face down as you hold the wand. 2. Keeping your elbows straight, slowly raise your arms over your head. Raise them until you feel a stretch in your shoulders, upper back, and chest.  3. Hold for 15 to 30 seconds. 4. Repeat 2 to 4 times. Shoulder rotation (lying down)    1. Lie on your back. Hold a wand with both hands with your elbows bent and palms up. 2. Keep your elbows close to your body, and move the wand across your body toward the sore arm. 3. Hold for 8 to 12 seconds. 4. Repeat 2 to 4 times. Shoulder blade squeeze    1. Stand with your arms at your sides, and squeeze your shoulder blades together. Do not raise your shoulders up as you squeeze. 2. Hold 6 seconds. 3. Repeat 8 to 12 times. Shoulder flexor and extensor exercise    1. Push forward (flex): Stand facing a wall or doorjamb, about 6 inches or less back. Hold your injured arm against your body. Make a closed fist with your thumb on top. Then gently push your hand forward into the wall with about 25% to 50% of your strength. Don't let your body move backward as you push. Hold for about 6 seconds. Relax for a few seconds. Repeat 8 to 12 times. 2. Push backward (extend): Stand with your back flat against a wall. Your upper arm should be against the wall, with your elbow bent 90 degrees (your hand straight ahead). Push your elbow gently back against the wall with about 25% to 50% of your strength. Don't let your body move forward as you push. Hold for about 6 seconds. Relax for a few seconds. Repeat 8 to 12 times. Scapular exercise: Wall push-ups    1. Stand facing a wall, about 12 inches to 18 inches away. 2. Place your hands on the wall at shoulder height. 3. Slowly bend your elbows and bring your face to the wall. Keep your back and hips straight. 4. Push back to where you started. 5. Repeat 8 to 12 times. 6. When you can do this exercise against a wall comfortably, you can try it against a counter.  You can then slowly progress to the end of a couch, then to a sturdy chair, and finally to the floor. Scapular exercise: Retraction    1. Put the band around a solid object at about waist level. (A bedpost will work well.) Each hand should hold an end of the band. 2. With your elbows at your sides and bent to 90 degrees, pull the band back. Your shoulder blades should move toward each other. Then move your arms back where you started. 3. Repeat 8 to 12 times. 4. If you have good range of motion in your shoulders, try this exercise with your arms lifted out to the sides. Keep your elbows at a 90-degree angle. Raise the elastic band up to about shoulder level. Pull the band back to move your shoulder blades toward each other. Then move your arms back where you started. Internal rotator strengthening exercise    1. Start by tying a piece of elastic exercise material to a doorknob. You can use surgical tubing or Thera-Band. 2. Stand or sit with your shoulder relaxed and your elbow bent 90 degrees. Your upper arm should rest comfortably against your side. Squeeze a rolled towel between your elbow and your body for comfort. This will help keep your arm at your side. 3. Hold one end of the elastic band in the hand of the painful arm. 4. Slowly rotate your forearm toward your body until it touches your belly. Slowly move it back to where you started. 5. Keep your elbow and upper arm firmly tucked against the towel roll or at your side. 6. Repeat 8 to 12 times. External rotator strengthening exercise    1. Start by tying a piece of elastic exercise material to a doorknob. You can use surgical tubing or Thera-Band. (You may also hold one end of the band in each hand.)  2. Stand or sit with your shoulder relaxed and your elbow bent 90 degrees. Your upper arm should rest comfortably against your side. Squeeze a rolled towel between your elbow and your body for comfort.  This will help keep your arm at your side.  3. Hold one end of the elastic band with the hand of the painful arm. 4. Start with your forearm across your belly. Slowly rotate the forearm out away from your body. Keep your elbow and upper arm tucked against the towel roll or the side of your body until you begin to feel tightness in your shoulder. Slowly move your arm back to where you started. 5. Repeat 8 to 12 times. Follow-up care is a key part of your treatment and safety. Be sure to make and go to all appointments, and call your doctor if you are having problems. It's also a good idea to know your test results and keep a list of the medicines you take. Where can you learn more? Go to http://janett-nano.info/. Enter O637 in the search box to learn more about \"Shoulder Bursitis: Exercises. \"  Current as of: November 29, 2017  Content Version: 11.8  © 8867-1024 [employer]. Care instructions adapted under license by [employer] (which disclaims liability or warranty for this information). If you have questions about a medical condition or this instruction, always ask your healthcare professional. [place] [address] any warranty or liability for your use of this information.

## 2018-11-29 ENCOUNTER — OFFICE VISIT (OUTPATIENT)
Dept: FAMILY MEDICINE CLINIC | Age: 53
End: 2018-11-29

## 2018-11-29 VITALS
SYSTOLIC BLOOD PRESSURE: 124 MMHG | TEMPERATURE: 98 F | HEART RATE: 60 BPM | BODY MASS INDEX: 30.21 KG/M2 | OXYGEN SATURATION: 98 % | RESPIRATION RATE: 18 BRPM | WEIGHT: 160 LBS | DIASTOLIC BLOOD PRESSURE: 84 MMHG | HEIGHT: 61 IN

## 2018-11-29 DIAGNOSIS — Z13.228 SCREENING FOR ENDOCRINE, METABOLIC AND IMMUNITY DISORDER: ICD-10-CM

## 2018-11-29 DIAGNOSIS — I10 ESSENTIAL HYPERTENSION: Primary | ICD-10-CM

## 2018-11-29 DIAGNOSIS — F41.8 MIXED ANXIETY DEPRESSIVE DISORDER: ICD-10-CM

## 2018-11-29 DIAGNOSIS — Z13.0 SCREENING FOR ENDOCRINE, METABOLIC AND IMMUNITY DISORDER: ICD-10-CM

## 2018-11-29 DIAGNOSIS — M25.511 ACUTE PAIN OF RIGHT SHOULDER: ICD-10-CM

## 2018-11-29 DIAGNOSIS — Z13.29 SCREENING FOR ENDOCRINE, METABOLIC AND IMMUNITY DISORDER: ICD-10-CM

## 2018-11-29 DIAGNOSIS — Z23 ENCOUNTER FOR IMMUNIZATION: ICD-10-CM

## 2018-11-29 RX ORDER — BUPROPION HYDROCHLORIDE 300 MG/1
TABLET ORAL
Qty: 90 TAB | Refills: 3 | Status: SHIPPED | OUTPATIENT
Start: 2018-11-29 | End: 2019-12-05 | Stop reason: SDUPTHER

## 2018-11-29 RX ORDER — ALPRAZOLAM 0.5 MG/1
0.5 TABLET ORAL
Qty: 20 TAB | Refills: 0 | Status: SHIPPED | OUTPATIENT
Start: 2018-11-29 | End: 2019-12-05 | Stop reason: SDUPTHER

## 2018-11-29 RX ORDER — HYDROCHLOROTHIAZIDE 25 MG/1
25 TABLET ORAL DAILY
Qty: 90 TAB | Refills: 3 | Status: SHIPPED | OUTPATIENT
Start: 2018-11-29 | End: 2019-12-05 | Stop reason: SDUPTHER

## 2018-11-29 RX ORDER — PAROXETINE 30 MG/1
60 TABLET, FILM COATED ORAL
Qty: 180 TAB | Refills: 3 | Status: SHIPPED | OUTPATIENT
Start: 2018-11-29 | End: 2019-12-05 | Stop reason: SDUPTHER

## 2018-11-29 NOTE — PATIENT INSTRUCTIONS
Shoulder Pain: Care Instructions  Your Care Instructions    You can hurt your shoulder by using it too much during an activity, such as fishing or baseball. It can also happen as part of the everyday wear and tear of getting older. Shoulder injuries can be slow to heal, but your shoulder should get better with time. Your doctor may recommend a sling to rest your shoulder. If you have injured your shoulder, you may need testing and treatment. Follow-up care is a key part of your treatment and safety. Be sure to make and go to all appointments, and call your doctor if you are having problems. It's also a good idea to know your test results and keep a list of the medicines you take. How can you care for yourself at home? · Take pain medicines exactly as directed. ? If the doctor gave you a prescription medicine for pain, take it as prescribed. ? If you are not taking a prescription pain medicine, ask your doctor if you can take an over-the-counter medicine. ? Do not take two or more pain medicines at the same time unless the doctor told you to. Many pain medicines contain acetaminophen, which is Tylenol. Too much acetaminophen (Tylenol) can be harmful. · If your doctor recommends that you wear a sling, use it as directed. Do not take it off before your doctor tells you to. · Put ice or a cold pack on the sore area for 10 to 20 minutes at a time. Put a thin cloth between the ice and your skin. · If there is no swelling, you can put moist heat, a heating pad, or a warm cloth on your shoulder. Some doctors suggest alternating between hot and cold. · Rest your shoulder for a few days. If your doctor recommends it, you can then begin gentle exercise of the shoulder, but do not lift anything heavy. When should you call for help? Call 911 anytime you think you may need emergency care. For example, call if:    · You have chest pain or pressure. This may occur with:  ? Sweating. ?  Shortness of breath. ? Nausea or vomiting. ? Pain that spreads from the chest to the neck, jaw, or one or both shoulders or arms. ? Dizziness or lightheadedness. ? A fast or uneven pulse. After calling 911, chew 1 adult-strength aspirin. Wait for an ambulance. Do not try to drive yourself.     · Your arm or hand is cool or pale or changes color.    Call your doctor now or seek immediate medical care if:    · You have signs of infection, such as:  ? Increased pain, swelling, warmth, or redness in your shoulder. ? Red streaks leading from a place on your shoulder. ? Pus draining from an area of your shoulder. ? Swollen lymph nodes in your neck, armpits, or groin. ? A fever.    Watch closely for changes in your health, and be sure to contact your doctor if:    · You cannot use your shoulder.     · Your shoulder does not get better as expected. Where can you learn more? Go to http://janett-nano.info/. Enter I383 in the search box to learn more about \"Shoulder Pain: Care Instructions. \"  Current as of: November 29, 2017  Content Version: 11.8  © 5882-1632 [employer]. Care instructions adapted under license by [employer] (which disclaims liability or warranty for this information). If you have questions about a medical condition or this instruction, always ask your healthcare professional. [place] [address] any warranty or liability for your use of this information.

## 2018-11-29 NOTE — PROGRESS NOTES
Chief Complaint   Patient presents with    Hypertension     follow up     1. Have you been to the ER, urgent care clinic since your last visit? Hospitalized since your last visit? No    2. Have you seen or consulted any other health care providers outside of the [address] [place] [address] since your last visit? Include any pap smears or colon screening.  No

## 2018-11-29 NOTE — PROGRESS NOTES
[facility] Note    Ovi Tomas is a 46 y.o. female who was seen in clinic today (11/29/2018). Subjective:  Cardiovascular Review:  The patient has hypertension. Diet and Lifestyle: generally follows a low fat low cholesterol diet, generally follows a low sodium diet, exercises regularly, nonsmoker  Home BP Monitoring: is well controlled at home, ranging 130/60's. Pertinent ROS: taking medications as instructed, no medication side effects noted, no TIA's, no chest pain on exertion, no dyspnea on exertion, no swelling of ankles. Patient had colonoscopy in 8/2017 with normal findings. Seen by GYN routinely. Patient is scheduled for hysteroscopy in 12/2018 for continued menstrual cycles. Anxiety  Patient complains of anxiety and depressive disorder, panic attacks, sleep disturbance. She has the following symptoms: insomnia, racing thoughts, psychomotor agitation, feelings of losing control, difficulty concentrating, depressed mood, anhedonia and weight loss. Onset of symptoms was approximately several years ago, gradually worsening since that time. She denies current suicidal and homicidal ideation. Treatment includes Paxil, Wellbutrin, Xanax and no other therapies. Currently seen by psychiatry, Dr Opal Givens and counselor routinely. Shoulder Pain  Patient complains of right side shoulder pain. The symptoms began 2 months ago Course of symptoms since onset has been symptoms have progressed to a point and plateaued. . Pain is described as location: glenohumeral region and worse with overhead movements. Symptoms were incited by repetitive activity, exercise. Therapy to date includes OTC analgesics: somewhat effective. Prior to Admission medications    Medication Sig Start Date End Date Taking? Authorizing Provider   varicella-zoster recombinant, PF, (SHINGRIX, PF,) 50 mcg/0.5 mL susr injection 0.5 mL by IntraMUSCular route once for 1 dose.  2nd dose administer in 2 months. 11/29/18 11/29/18 Yes Lorena Craig NP   PARoxetine (PAXIL) 30 mg tablet Take 2 Tabs by mouth nightly. 11/29/18  Yes Lorena Craig NP   ALPRAZolam Ferdie Macadam) 0.5 mg tablet Take 1 Tab by mouth daily as needed for Anxiety. 11/29/18  Yes Eddy Louis NP   buPROPion XL (WELLBUTRIN XL) 300 mg XL tablet TAKE ONE TABLET BY MOUTH EVERY MORNING 11/29/18  Yes Lorena Craig NP   hydroCHLOROthiazide (HYDRODIURIL) 25 mg tablet Take 1 Tab by mouth daily. For blood pressure 11/29/18  Yes Lorena Craig NP   multivitamin (ONE A DAY) tablet Take 1 tablet by mouth daily. Yes Provider, Historical          No Known Allergies        ROS  See HPI    Objective:   Physical Exam   Constitutional: She is oriented to person, place, and time. She appears well-developed and well-nourished. Neck: Normal range of motion. Neck supple. No JVD present. Carotid bruit is not present. No thyromegaly present. Cardiovascular: Normal rate, regular rhythm and intact distal pulses. Exam reveals no gallop and no friction rub. No murmur heard. Pulmonary/Chest: Effort normal and breath sounds normal. No respiratory distress. Musculoskeletal: She exhibits no edema. Lymphadenopathy:     She has no cervical adenopathy. Neurological: She is alert and oriented to person, place, and time. Psychiatric: She has a normal mood and affect. Her behavior is normal.   Nursing note and vitals reviewed. Visit Vitals  /84 (BP 1 Location: Right arm, BP Patient Position: Sitting)   Pulse 60   Temp 98 °F (36.7 °C) (Oral)   Resp 18   Ht 5' 1\" (1.549 m)   Wt 160 lb (72.6 kg)   LMP 11/08/2018 (Exact Date)   SpO2 98%   BMI 30.23 kg/m²       Assessment & Plan:  Diagnoses and all orders for this visit:    1. Essential hypertension  Well controlled, no medication changes. -     hydroCHLOROthiazide (HYDRODIURIL) 25 mg tablet; Take 1 Tab by mouth daily. For blood pressure    2.  Mixed anxiety depressive disorder  Stable, no changes to current therapy  -     PARoxetine (PAXIL) 30 mg tablet; Take 2 Tabs by mouth nightly. -     ALPRAZolam (XANAX) 0.5 mg tablet; Take 1 Tab by mouth daily as needed for Anxiety. -     buPROPion XL (WELLBUTRIN XL) 300 mg XL tablet; TAKE ONE TABLET BY MOUTH EVERY MORNING    3. Acute pain of right shoulder  Request orthopedic evaluation and treatment.   -     REFERRAL TO ORTHOPEDICS    4. Screening for endocrine, metabolic and immunity disorder  -     CBC W/O DIFF  -     METABOLIC PANEL, COMPREHENSIVE  -     LIPID PANEL  -     HEMOGLOBIN A1C W/O EAG  -     TSH AND FREE T4    5. Encounter for immunization  -     varicella-zoster recombinant, PF, (SHINGRIX, PF,) 50 mcg/0.5 mL susr injection; 0.5 mL by IntraMUSCular route once for 1 dose. 2nd dose administer in 2 months. I have discussed the diagnosis with the patient and the intended plan as seen in the above orders. The patient has received an after-visit summary along with patient information handout. I have discussed medication side effects and warnings with the patient as well. Follow-up Disposition:  Return in about 1 year (around 11/29/2019) for blood pressure, Annual Exam - 30 minutes.         Tisha Mello NP

## 2018-11-30 LAB
ALBUMIN SERPL-MCNC: 3.8 G/DL (ref 3.5–5.5)
ALBUMIN/GLOB SERPL: 1.3 {RATIO} (ref 1.2–2.2)
ALP SERPL-CCNC: 72 IU/L (ref 39–117)
ALT SERPL-CCNC: 39 IU/L (ref 0–32)
AST SERPL-CCNC: 36 IU/L (ref 0–40)
BILIRUB SERPL-MCNC: 0.3 MG/DL (ref 0–1.2)
BUN SERPL-MCNC: 13 MG/DL (ref 6–24)
BUN/CREAT SERPL: 17 (ref 9–23)
CALCIUM SERPL-MCNC: 8.9 MG/DL (ref 8.7–10.2)
CHLORIDE SERPL-SCNC: 100 MMOL/L (ref 96–106)
CHOLEST SERPL-MCNC: 161 MG/DL (ref 100–199)
CO2 SERPL-SCNC: 26 MMOL/L (ref 20–29)
CREAT SERPL-MCNC: 0.78 MG/DL (ref 0.57–1)
ERYTHROCYTE [DISTWIDTH] IN BLOOD BY AUTOMATED COUNT: 13.5 % (ref 12.3–15.4)
GLOBULIN SER CALC-MCNC: 3 G/DL (ref 1.5–4.5)
GLUCOSE SERPL-MCNC: 83 MG/DL (ref 65–99)
HBA1C MFR BLD: 5.4 % (ref 4.8–5.6)
HCT VFR BLD AUTO: 35.2 % (ref 34–46.6)
HDLC SERPL-MCNC: 70 MG/DL
HGB BLD-MCNC: 12 G/DL (ref 11.1–15.9)
INTERPRETATION, 910389: NORMAL
LDLC SERPL CALC-MCNC: 82 MG/DL (ref 0–99)
MCH RBC QN AUTO: 27.8 PG (ref 26.6–33)
MCHC RBC AUTO-ENTMCNC: 34.1 G/DL (ref 31.5–35.7)
MCV RBC AUTO: 82 FL (ref 79–97)
PLATELET # BLD AUTO: 303 X10E3/UL (ref 150–379)
POTASSIUM SERPL-SCNC: 4.3 MMOL/L (ref 3.5–5.2)
PROT SERPL-MCNC: 6.8 G/DL (ref 6–8.5)
RBC # BLD AUTO: 4.32 X10E6/UL (ref 3.77–5.28)
SODIUM SERPL-SCNC: 139 MMOL/L (ref 134–144)
T4 FREE SERPL-MCNC: 1.1 NG/DL (ref 0.82–1.77)
TRIGL SERPL-MCNC: 46 MG/DL (ref 0–149)
TSH SERPL DL<=0.005 MIU/L-ACNC: 1.49 UIU/ML (ref 0.45–4.5)
VLDLC SERPL CALC-MCNC: 9 MG/DL (ref 5–40)
WBC # BLD AUTO: 5.8 X10E3/UL (ref 3.4–10.8)

## 2019-01-08 ENCOUNTER — OFFICE VISIT (OUTPATIENT)
Dept: FAMILY MEDICINE CLINIC | Age: 54
End: 2019-01-08

## 2019-01-08 VITALS
SYSTOLIC BLOOD PRESSURE: 136 MMHG | RESPIRATION RATE: 18 BRPM | BODY MASS INDEX: 30.06 KG/M2 | OXYGEN SATURATION: 98 % | TEMPERATURE: 98.4 F | DIASTOLIC BLOOD PRESSURE: 78 MMHG | HEART RATE: 56 BPM | WEIGHT: 159.2 LBS | HEIGHT: 61 IN

## 2019-01-08 DIAGNOSIS — J06.9 VIRAL URI: Primary | ICD-10-CM

## 2019-01-08 RX ORDER — MENTHOL
2000 GEL (GRAM) TOPICAL DAILY
COMMUNITY
End: 2019-11-05

## 2019-01-08 NOTE — PROGRESS NOTES
Patient Name: Dilshad Reynolds   MRN: [id number]    Kathryn Anguiano is a 46 y.o. female who presents with the following:     Patient reports congestion, sore throat, nasal blockage, post nasal drip, dry cough and URI symptoms for one week, gradually improving since that time. Denies a history of fever, chills, chest congestion, wheezing, SOB/CHERRY and chest pain. Evaluation to date: none. Treatment to date: OTC products. Relevant PMH: No pertinent additional PMH. Patient reports sick contacts: yes. Review of Systems   Constitutional: Negative for fever, malaise/fatigue and weight loss. HENT: Positive for congestion. Negative for ear discharge, hearing loss and tinnitus. Respiratory: Positive for cough. Negative for hemoptysis, shortness of breath and wheezing. Cardiovascular: Negative for chest pain, palpitations, leg swelling and PND. Gastrointestinal: Negative for abdominal pain, constipation, diarrhea, nausea and vomiting. The patient's medications, allergies, past medical history, surgical history, family history and social history were reviewed and updated where appropriate. Prior to Admission medications    Medication Sig Start Date End Date Taking? Authorizing Provider   vitamin e (E [facility]) 1,000 unit capsule Take 2,000 Units by mouth daily. Yes Provider, Historical   PARoxetine (PAXIL) 30 mg tablet Take 2 Tabs by mouth nightly. 11/29/18  Yes Etienne Craig NP   ALPRAZolam Misael [place]) 0.5 mg tablet Take 1 Tab by mouth daily as needed for Anxiety. 11/29/18  Yes Marylin Luna NP   buPROPion XL (WELLBUTRIN XL) 300 mg XL tablet TAKE ONE TABLET BY MOUTH EVERY MORNING 11/29/18  Yes Etienne Craig NP   hydroCHLOROthiazide (HYDRODIURIL) 25 mg tablet Take 1 Tab by mouth daily. For blood pressure 11/29/18  Yes Etienne Craig NP   multivitamin (ONE A DAY) tablet Take 1 tablet by mouth daily.    Yes Provider, Historical       No Known Allergies        OBJECTIVE    Visit Vitals  /78 (BP 1 Location: Right arm, BP Patient Position: Sitting)   Pulse (!) 56   Temp 98.4 °F (36.9 °C) (Oral)   Resp 18   Ht 5' 1\" (1.549 m)   Wt 159 lb 3.2 oz (72.2 kg)   LMP 01/06/2019 (Exact Date)   SpO2 98%   BMI 30.08 kg/m²       Physical Exam   Constitutional: She is oriented to person, place, and time and well-developed, well-nourished, and in no distress. No distress. HENT:   Head: Normocephalic and atraumatic. Right Ear: Tympanic membrane is not perforated and not erythematous. A middle ear effusion is present. No decreased hearing is noted. Left Ear: Tympanic membrane is not perforated and not erythematous. A middle ear effusion is present. No decreased hearing is noted. Nose: Rhinorrhea present. Right sinus exhibits no maxillary sinus tenderness and no frontal sinus tenderness. Left sinus exhibits no maxillary sinus tenderness and no frontal sinus tenderness. Mouth/Throat: Uvula is midline, oropharynx is clear and moist and mucous membranes are normal.   Neck: Normal range of motion. Neck supple. Cardiovascular: Normal rate, regular rhythm and normal heart sounds. Exam reveals no gallop and no friction rub. No murmur heard. Pulmonary/Chest: Effort normal and breath sounds normal. No respiratory distress. She has no wheezes. Lymphadenopathy:     She has no cervical adenopathy. Neurological: She is alert and oriented to person, place, and time. Skin: She is not diaphoretic. Psychiatric: Mood, memory, affect and judgment normal.   Nursing note and vitals reviewed. ASSESSMENT AND PLAN  Bria Flood is a 46 y.o. female who presents today for:    1.  Viral URI  discussed diagnosis & treatment options, most likely viral at this time, reviewed the importance of avoiding unnecessary antibiotic therapy, reviewed which OTC medications to use and avoid, expected time course for resolution & red flags were reviewed with her to RTC or notify me. Recommend Flonase and prn Mucinex. There are no discontinued medications. Follow-up Disposition:  Return if symptoms worsen or fail to improve. Medication risks/benefits/costs/interactions/alternatives discussed with patient. Advised patient to call back or return to office if symptoms worsen/change/persist. If patient cannot reach us or should anything more severe/urgent arise he/she should proceed directly to the nearest emergency department. Discussed expected course/resolution/complications of diagnosis in detail with patient. Patient given a written after visit summary which includes his/her diagnoses, current medications and vitals. Patient expressed understanding with the diagnosis and plan. Telly Maya M.D.

## 2019-01-08 NOTE — PATIENT INSTRUCTIONS
Fluticasone (Into the nose)   Fluticasone ([id number]-u-[id number])  Treats allergy symptoms, such as runny or stuffy nose. This medicine is a corticosteroid. Brand Name(s): Children's Flonase, ClariSpray, DermacinRx [place], DermacinRx [place], [facility][note 3]0 [facility], Flonase Sensimist, Fluticasone Propionate Novaplus, Ticaspray, Veramyst   There may be other brand names for this medicine. When This Medicine Should Not Be Used: This medicine is not right for everyone. Do not use if you had an allergic reaction to fluticasone. How to Use This Medicine:   Spray  · Your doctor will tell you how much medicine to use. Do not use more than directed. · This medicine is for use only in the nose. Do not get any of it in your eyes or on your skin. If it does get on these areas, rinse it off right away. · Prime the spray: Release 6 test sprays into the air away from the face, or pump the bottle until some of the medicine sprays out. Now it is ready to use. Prime the spray if it has not been used for more than 7 days (or 30 days for Veramyst®) or if the cap has been left off the bottle for 5 days or longer. · Shake the medicine well just before each use. · Before using the medicine, gently blow your nose to clear the nostrils. · After using the nasal spray, wipe the tip of the bottle with a clean tissue and put the cap back on. · You may need to use this medicine for a few days before you start to feel better. · Read and follow the patient instructions that come with this medicine. Talk to your doctor or pharmacist if you have any questions. · Follow the instructions on the medicine label if you are using this medicine without a prescription. · Missed dose: Take a dose as soon as you remember. If it is almost time for your next dose, wait until then and take a regular dose. Do not take extra medicine to make up for a missed dose. · Keep the bottle tightly closed when not using it.  Store at room temperature, away from heat and direct light. Do not freeze or refrigerate. Throw this medicine away after you use 120 sprays. Drugs and Foods to Avoid:   Ask your doctor or pharmacist before using any other medicine, including over-the-counter medicines, vitamins, and herbal products. · Do not use this medicine together with ritonavir. · Some foods and medicines can affect how fluticasone works. Tell your doctor if you are using ketoconazole. Warnings While Using This Medicine:   · Tell your doctor if you are pregnant or breastfeeding, or if you have liver disease, asthma, an infection, or a history of cataracts or glaucoma. Make sure your doctor knows if you have had nose surgery, a nose injury, or a recent infection in your nose. · This medicine may cause the following problems:  ¨ Holes or ulcers inside the nose  ¨ Slow wound healing  ¨ Cataracts or glaucoma  ¨ Problems with the adrenal glands  ¨ Slow growth in children  · Avoid people who are sick or have infections. Tell your doctor right away if you think you have been exposed to measles or chickenpox. · Your doctor will check your progress and the effects of this medicine at regular visits. Keep all appointments. · Call your doctor if your symptoms do not improve or if they get worse. · Keep all medicine out of the reach of children. Never share your medicine with anyone.   Possible Side Effects While Using This Medicine:   Call your doctor right away if you notice any of these side effects:  · Allergic reaction: Itching or hives, swelling in your face or hands, swelling or tingling in your mouth or throat, chest tightness, trouble breathing  · Burning, redness, swelling, or irritation around or inside your nose  · Eye pain or vision changes  · Fever, chills, cough, sore throat, and body aches  · Heavy nosebleeds  · Sores or white patches inside the nose or mouth  · Tiredness, weakness, dizziness  If you notice other side effects that you think are caused by this medicine, tell your doctor. Call your doctor for medical advice about side effects. You may report side effects to FDA at 5-480-PZW-5374  © 2017 [facility] Information is for End User's use only and may not be sold, redistributed or otherwise used for commercial purposes. The above information is an  only. It is not intended as medical advice for individual conditions or treatments. Talk to your doctor, nurse or pharmacist before following any medical regimen to see if it is safe and effective for you.

## 2019-01-08 NOTE — PROGRESS NOTES
Chief Complaint   Patient presents with    Cold Symptoms     congestion, cough x 1 week     1. Have you been to the ER, urgent care clinic since your last visit? Hospitalized since your last visit? No    2. Have you seen or consulted any other health care providers outside of the [address] [place] [address] since your last visit? Include any pap smears or colon screening.  No

## 2019-11-05 ENCOUNTER — OFFICE VISIT (OUTPATIENT)
Dept: CARDIOLOGY CLINIC | Age: 54
End: 2019-11-05

## 2019-11-05 VITALS
SYSTOLIC BLOOD PRESSURE: 130 MMHG | WEIGHT: 158.6 LBS | BODY MASS INDEX: 29.94 KG/M2 | DIASTOLIC BLOOD PRESSURE: 80 MMHG | OXYGEN SATURATION: 97 % | HEIGHT: 61 IN | HEART RATE: 55 BPM | RESPIRATION RATE: 12 BRPM

## 2019-11-05 DIAGNOSIS — G47.33 OSA (OBSTRUCTIVE SLEEP APNEA): ICD-10-CM

## 2019-11-05 DIAGNOSIS — I10 ESSENTIAL HYPERTENSION: Primary | ICD-10-CM

## 2019-11-05 DIAGNOSIS — R06.02 SHORTNESS OF BREATH: ICD-10-CM

## 2019-11-05 RX ORDER — POTASSIUM CHLORIDE 20 MEQ/1
20 TABLET, EXTENDED RELEASE ORAL DAILY
Qty: 90 TAB | Refills: 3 | Status: SHIPPED | OUTPATIENT
Start: 2019-11-05 | End: 2019-11-05

## 2019-11-05 RX ORDER — POTASSIUM CHLORIDE 20 MEQ/1
TABLET, EXTENDED RELEASE ORAL 2 TIMES DAILY
COMMUNITY
End: 2019-11-05 | Stop reason: SDUPTHER

## 2019-11-05 RX ORDER — POTASSIUM CHLORIDE 750 MG/1
TABLET, FILM COATED, EXTENDED RELEASE ORAL
COMMUNITY
End: 2019-11-05 | Stop reason: ALTCHOICE

## 2019-11-05 NOTE — PROGRESS NOTES
[facility]  [phone]    HPI: Melody Angelo, a 53y.o. year-old who presents for evaluation of chest pain. Having some left sided chest pain, episodes can last a couple of minutes and usually occur at rest, gets winded running down the soccer field. Not really diffenet, winded with spinits but doing ok. Labs and annual coming up in the next month. Weight training and exercise are the same as prior visit. Has occasional pinches related to stress. Not exertional, not recently. Wants to know if she can take melatonin at night. She does not have any associated symptoms  She does not get chest pain with activity  She had a stress test that was ok many years ago  No palpitations, dizziness, syncope   No CHERRY or PND, LE edema   She does some weight training with cardio 2 times/week and plays co-ed soccer once/week   Her mother is a patient of mine and she is here today as well, she is concerned that her daughter may have aortic valve insufficiency as well and would like for her to have an evaluation    Assessment/Plan:  1. Chest pain - not a problem now  -lipids at goal in 11/17  2. HTN - well controlled on HCTZ, no palpitations or dizziness  -took Atenolol in the past but cannot remember why it was changed to HCTZ  3. Anxiety/Depression - followed by PCP/psych, encouraged her to exercise regularly for stress reduction  4. Fam hx of aortic insufficiency in mother - will check aortic valve with stress echo  5. Body mass index is 29.97 kg/m². weight is stable    Soc Hx: no tobacco use   Fam Hx: no early CAD in the family  She  has a past medical history of Anxiety, Depression, Hearing aid worn, Herpes (1990), and Hypertension. Cardiovascular ROS: positive for chest pain, no dyspnea on exertion  Respiratory ROS: no cough, shortness of breath, or wheezing  Neurological ROS: no TIA or stroke symptoms  All other systems negative except as above.      PE  Vitals:    11/05/19 0935   BP: 130/80   Pulse: (!) 55   Resp: 12   SpO2: 97%   Weight: 158 lb 9.6 oz (71.9 kg)   Height: 5' 1\" (1.549 m)    Body mass index is 29.97 kg/m².    General appearance - alert, well appearing, and in no distress  Mental status - affect appropriate to mood  Eyes - sclera anicteric, moist mucous membranes  Neck - supple  Lymphatics - not assessed  Chest - clear to auscultation, no wheezes, rales or rhonchi  Heart - normal rate, regular rhythm, normal S1, S2, no murmurs, rubs, clicks or gallops  Abdomen - soft, nontender, nondistended, no masses or organomegaly  Back exam - full range of motion, no tenderness  Neurological - cranial nerves II through XII grossly intact, no focal deficit  Musculoskeletal - no muscular tenderness noted, normal strength  Extremities - peripheral pulses normal, no pedal edema  Skin - normal coloration  no rashes    12 lead EC18 - sinus bradycardia VR 51bpm, non-specific ST-T wave changes    Recent Labs:  Lab Results   Component Value Date/Time    Cholesterol, total 161 2018 10:31 AM    HDL Cholesterol 70 2018 10:31 AM    LDL, calculated 82 2018 10:31 AM    Triglyceride 46 2018 10:31 AM     Lab Results   Component Value Date/Time    Creatinine 0.78 2018 10:31 AM     Lab Results   Component Value Date/Time    BUN 13 2018 10:31 AM     Lab Results   Component Value Date/Time    Potassium 4.3 2018 10:31 AM     Lab Results   Component Value Date/Time    Hemoglobin A1c 5.4 2018 10:31 AM     Lab Results   Component Value Date/Time    HGB 12.0 2018 10:31 AM     Lab Results   Component Value Date/Time    PLATELET 013  10:31 AM       Reviewed:  Past Medical History:   Diagnosis Date    Anxiety     Depression     Hearing aid worn     Bilateral    Herpes 1990    Hypertension      Social History     Tobacco Use   Smoking Status Never Smoker   Smokeless Tobacco Never Used     Social History     Substance and Sexual Activity Alcohol Use Yes    Comment: OCC. No Known Allergies    Current Outpatient Medications   Medication Sig    ascorbic acid (VITAMIN C PO) Take 1 Tab by mouth daily.  PARoxetine (PAXIL) 30 mg tablet Take 2 Tabs by mouth nightly.  ALPRAZolam (XANAX) 0.5 mg tablet Take 1 Tab by mouth daily as needed for Anxiety.  buPROPion XL (WELLBUTRIN XL) 300 mg XL tablet TAKE ONE TABLET BY MOUTH EVERY MORNING    hydroCHLOROthiazide (HYDRODIURIL) 25 mg tablet Take 1 Tab by mouth daily. For blood pressure    multivitamin (ONE A DAY) tablet Take 1 tablet by mouth daily. No current facility-administered medications for this visit.         Robert Mccarthy MD  [facility] [address], [address] [address]  [address] [facility], [address] [place] [address]  [phone]

## 2019-11-05 NOTE — PROGRESS NOTES
Chief Complaint   Patient presents with    Follow-up     overdue. Denies chest pain/shortness of breath/swelling/dizziness. Visit Vitals  /80 (BP 1 Location: Right arm, BP Patient Position: Sitting)   Pulse (!) 55   Resp 12   Ht 5' 1\" (1.549 m)   Wt 158 lb 9.6 oz (71.9 kg)   SpO2 97%   BMI 29.97 kg/m²     Medication profile updated. Verbal order per Dr. Betancourt Links.

## 2019-12-05 ENCOUNTER — OFFICE VISIT (OUTPATIENT)
Dept: FAMILY MEDICINE CLINIC | Age: 54
End: 2019-12-05

## 2019-12-05 VITALS
OXYGEN SATURATION: 97 % | HEIGHT: 61 IN | BODY MASS INDEX: 30.02 KG/M2 | HEART RATE: 63 BPM | WEIGHT: 159 LBS | SYSTOLIC BLOOD PRESSURE: 126 MMHG | RESPIRATION RATE: 18 BRPM | TEMPERATURE: 97.8 F | DIASTOLIC BLOOD PRESSURE: 88 MMHG

## 2019-12-05 DIAGNOSIS — Z00.00 ROUTINE GENERAL MEDICAL EXAMINATION AT A HEALTH CARE FACILITY: Primary | ICD-10-CM

## 2019-12-05 DIAGNOSIS — Z23 NEED FOR SHINGLES VACCINE: ICD-10-CM

## 2019-12-05 DIAGNOSIS — I10 ESSENTIAL HYPERTENSION: ICD-10-CM

## 2019-12-05 DIAGNOSIS — F41.8 MIXED ANXIETY DEPRESSIVE DISORDER: ICD-10-CM

## 2019-12-05 RX ORDER — PAROXETINE 30 MG/1
60 TABLET, FILM COATED ORAL
Qty: 180 TAB | Refills: 3 | Status: SHIPPED | OUTPATIENT
Start: 2019-12-05 | End: 2020-11-24

## 2019-12-05 RX ORDER — HYDROCHLOROTHIAZIDE 25 MG/1
25 TABLET ORAL DAILY
Qty: 90 TAB | Refills: 3 | Status: SHIPPED | OUTPATIENT
Start: 2019-12-05 | End: 2020-11-24

## 2019-12-05 RX ORDER — ALPRAZOLAM 0.5 MG/1
0.5 TABLET ORAL
Qty: 20 TAB | Refills: 0 | Status: SHIPPED | OUTPATIENT
Start: 2019-12-05 | End: 2020-12-08 | Stop reason: SDUPTHER

## 2019-12-05 RX ORDER — BUPROPION HYDROCHLORIDE 300 MG/1
TABLET ORAL
Qty: 90 TAB | Refills: 3 | Status: SHIPPED | OUTPATIENT
Start: 2019-12-05 | End: 2020-11-24

## 2019-12-05 NOTE — PROGRESS NOTES
Chief Complaint   Patient presents with    Complete Physical       Reviewed Record in preparation for visit and have obtained necessary documentation. Identified pt with two pt identifiers (Name @ )    Health Maintenance Due   Topic    Shingrix Vaccine Age 50> (1 of 2)         1. Have you been to the ER, urgent care clinic since your last visit? Hospitalized since your last visit? No      2. Have you seen or consulted any other health care providers outside of the [address] [place] [address] since your last visit? Include any pap smears or colon screening.  no

## 2019-12-05 NOTE — PATIENT INSTRUCTIONS
Well Visit, Women 48 to 72: Care Instructions  Your Care Instructions    Physical exams can help you stay healthy. Your doctor has checked your overall health and may have suggested ways to take good care of yourself. He or she also may have recommended tests. At home, you can help prevent illness with healthy eating, regular exercise, and other steps. Follow-up care is a key part of your treatment and safety. Be sure to make and go to all appointments, and call your doctor if you are having problems. It's also a good idea to know your test results and keep a list of the medicines you take. How can you care for yourself at home? · Reach and stay at a healthy weight. This will lower your risk for many problems, such as obesity, diabetes, heart disease, and high blood pressure. · Get at least 30 minutes of exercise on most days of the week. Walking is a good choice. You also may want to do other activities, such as running, swimming, cycling, or playing tennis or team sports. · Do not smoke. Smoking can make health problems worse. If you need help quitting, talk to your doctor about stop-smoking programs and medicines. These can increase your chances of quitting for good. · Protect your skin from too much sun. When you're outdoors from 10 a.m. to 4 p.m., stay in the shade or cover up with clothing and a hat with a wide brim. Wear sunglasses that block UV rays. Even when it's cloudy, put broad-spectrum sunscreen (SPF 30 or higher) on any exposed skin. · See a dentist one or two times a year for checkups and to have your teeth cleaned. · Wear a seat belt in the car. Follow your doctor's advice about when to have certain tests. These tests can spot problems early. · Cholesterol. Your doctor will tell you how often to have this done based on your age, family history, or other things that can increase your risk for heart attack and stroke. · Blood pressure.  Have your blood pressure checked during a routine doctor visit. Your doctor will tell you how often to check your blood pressure based on your age, your blood pressure results, and other factors. · Mammogram. Ask your doctor how often you should have a mammogram, which is an X-ray of your breasts. A mammogram can spot breast cancer before it can be felt and when it is easiest to treat. · Pap test and pelvic exam. Ask your doctor how often you should have a Pap test. You may not need to have a Pap test as often as you used to. · Vision. Have your eyes checked every year or two or as often as your doctor suggests. Some experts recommend that you have yearly exams for glaucoma and other age-related eye problems starting at age 48. · Hearing. Tell your doctor if you notice any change in your hearing. You can have tests to find out how well you hear. · Diabetes. Ask your doctor whether you should have tests for diabetes. · Colorectal cancer. Your risk for colorectal cancer gets higher as you get older. Some experts say that adults should start regular screening at age 48 and stop at age 76. Others say to start before age 48 or continue after age 76. Talk with your doctor about your risk and when to start and stop screening. · Thyroid disease. Talk to your doctor about whether to have your thyroid checked as part of a regular physical exam. Women have an increased chance of a thyroid problem. · Osteoporosis. You should begin tests for bone density at age 72. If you are younger than 72, ask your doctor whether you have factors that may increase your risk for this disease. You may want to have this test before age 72. · Heart attack and stroke risk. At least every 4 to 6 years, you should have your risk for heart attack and stroke assessed. Your doctor uses factors such as your age, blood pressure, cholesterol, and whether you smoke or have diabetes to show what your risk for a heart attack or stroke is over the next 10 years.   When should you call for help?  Watch closely for changes in your health, and be sure to contact your doctor if you have any problems or symptoms that concern you. Where can you learn more? Go to http://janett-nano.info/. Enter E589 in the search box to learn more about \"Well Visit, Women 50 to 72: Care Instructions. \"  Current as of: December 13, 2018  Content Version: 12.2  © 9136-0946 [employer], [employer]. Care instructions adapted under license by [employer] (which disclaims liability or warranty for this information). If you have questions about a medical condition or this instruction, always ask your healthcare professional. [address] any warranty or liability for your use of this information.

## 2019-12-05 NOTE — PROGRESS NOTES
[facility] Note    Christa Lawton is a 53 y.o. female who was seen in clinic today (12/5/2019). Subjective:  Cardiovascular Review:  The patient has hypertension. Diet and Lifestyle: generally follows a low fat low cholesterol diet, generally follows a low sodium diet, exercises regularly, nonsmoker  Home BP Monitoring: is well controlled at home, ranging 130/60's. Pertinent ROS: taking medications as instructed, no medication side effects noted, no TIA's, no chest pain on exertion, no dyspnea on exertion, no swelling of ankles.      Patient had colonoscopy in 8/2017 with normal findings.      Seen by GYN routinely. Menstrual cycles are irregular.       Anxiety  Patient complains of anxiety and depressive disorder, panic attacks, sleep disturbance. She has the following symptoms: insomnia, racing thoughts, psychomotor agitation, feelings of losing control, difficulty concentrating, depressed mood, anhedonia and weight loss. Onset of symptoms was approximately several years ago, gradually worsening since that time. She denies current suicidal and homicidal ideation. Treatment includes Paxil, Wellbutrin, Xanax and no other therapies. Currently seen by psychiatry, Dr Destiny Evans and counselor routinely.      Reports intermittent left foot pain, worse with walking barefoot. Prior to Admission medications    Medication Sig Start Date End Date Taking? Authorizing Provider   varicella-zoster recombinant, PF, (SHINGRIX, PF,) 50 mcg/0.5 mL susr injection 0.5 mL by IntraMUSCular route once for 1 dose. Give second dose 2 months or after first dose 12/5/19 12/5/19 Yes Sherwin Craig NP   PARoxetine (PAXIL) 30 mg tablet Take 2 Tabs by mouth nightly. 12/5/19  Yes Sherwin Craig NP   hydroCHLOROthiazide (HYDRODIURIL) 25 mg tablet Take 1 Tab by mouth daily.  For blood pressure 12/5/19  Yes Sherwin Craig NP   buPROPion XL (WELLBUTRIN XL) 300 mg XL tablet TAKE ONE TABLET BY MOUTH EVERY MORNING 12/5/19  Yes Carolina Craig NP   ALPRAZolam Rosa Kings) 0.5 mg tablet Take 1 Tab by mouth daily as needed for Anxiety. 12/5/19  Yes Carolina Craig NP   ascorbic acid (VITAMIN C PO) Take 1 Tab by mouth daily. Yes Provider, Historical   multivitamin (ONE A DAY) tablet Take 1 tablet by mouth daily. Yes Provider, Historical   varicella-zoster recombinant, PF, (SHINGRIX, PF,) 50 mcg/0.5 mL susr injection 0.5 mL by IntraMUSCular route once. 12/5/19  Provider, Historical   PARoxetine (PAXIL) 30 mg tablet Take 2 Tabs by mouth nightly. 11/29/18 12/5/19  Crescencio Mirza NP   ALPRAZolam Rosa Kings) 0.5 mg tablet Take 1 Tab by mouth daily as needed for Anxiety. 11/29/18 12/5/19  Crescencio Mirza NP   buPROPion XL (WELLBUTRIN XL) 300 mg XL tablet TAKE ONE TABLET BY MOUTH EVERY MORNING 11/29/18 12/5/19  Crescencio Mirza NP   hydroCHLOROthiazide (HYDRODIURIL) 25 mg tablet Take 1 Tab by mouth daily. For blood pressure 11/29/18 12/5/19  Crescencio Mirza NP          No Known Allergies        Review of Systems   Constitutional: Negative for malaise/fatigue and weight loss. HENT: Negative for hearing loss. Eyes: Negative for blurred vision. Respiratory: Negative for shortness of breath. Cardiovascular: Negative for chest pain, palpitations and leg swelling. Gastrointestinal: Negative for abdominal pain, constipation, diarrhea and heartburn. Genitourinary: Negative for frequency and urgency. Musculoskeletal: Negative for back pain, joint pain and myalgias. Neurological: Negative for dizziness, weakness and headaches. Endo/Heme/Allergies: Does not bruise/bleed easily. Psychiatric/Behavioral: Negative for depression. Objective:   Physical Exam  Vitals signs and nursing note reviewed. Constitutional:       General: She is not in acute distress. Appearance: She is well-developed.    HENT:      Right Ear: Tympanic membrane and ear canal normal.      Left Ear: Tympanic membrane and ear canal normal.      Nose: No mucosal edema. Right Sinus: No maxillary sinus tenderness or frontal sinus tenderness. Left Sinus: No maxillary sinus tenderness or frontal sinus tenderness. Eyes:      Conjunctiva/sclera: Conjunctivae normal.      Pupils: Pupils are equal, round, and reactive to light. Neck:      Musculoskeletal: Normal range of motion and neck supple. Thyroid: No thyromegaly. Vascular: No carotid bruit or JVD. Cardiovascular:      Rate and Rhythm: Normal rate and regular rhythm. Heart sounds: Normal heart sounds. No murmur. No friction rub. No gallop. Pulmonary:      Effort: Pulmonary effort is normal. No respiratory distress. Breath sounds: Normal breath sounds. No decreased breath sounds, wheezing or rhonchi. Abdominal:      General: Bowel sounds are normal. There is no distension. Palpations: Abdomen is soft. Tenderness: There is no tenderness. Lymphadenopathy:      Cervical: No cervical adenopathy. Neurological:      Mental Status: She is alert and oriented to person, place, and time. Psychiatric:         Behavior: Behavior normal.           Visit Vitals  /88 (BP 1 Location: Right arm, BP Patient Position: Sitting)   Pulse 63   Temp 97.8 °F (36.6 °C) (Oral)   Resp 18   Ht 5' 1\" (1.549 m)   Wt 159 lb (72.1 kg)   LMP 10/15/2019   SpO2 97%   BMI 30.04 kg/m²       Assessment & Plan:  Diagnoses and all orders for this visit:    1. Routine general medical examination at a health care facility  Well adult, reviewed routine screenings as well as healthy diet and lifestyle practices\  -     LIPID PANEL  -     METABOLIC PANEL, COMPREHENSIVE  -     CBC W/O DIFF  -     HEMOGLOBIN A1C WITH EAG  -     TSH AND FREE T4    2. Essential hypertension  Well controlled, no medication changes.  -     METABOLIC PANEL, COMPREHENSIVE  -     hydroCHLOROthiazide (HYDRODIURIL) 25 mg tablet; Take 1 Tab by mouth daily.  For blood pressure    3. Mixed anxiety depressive disorder  Stable, no changes to current therapy  -     PARoxetine (PAXIL) 30 mg tablet; Take 2 Tabs by mouth nightly. -     buPROPion XL (WELLBUTRIN XL) 300 mg XL tablet; TAKE ONE TABLET BY MOUTH EVERY MORNING  -     ALPRAZolam (XANAX) 0.5 mg tablet; Take 1 Tab by mouth daily as needed for Anxiety. 4. Need for shingles vaccine  -     varicella-zoster recombinant, PF, (SHINGRIX, PF,) 50 mcg/0.5 mL susr injection; 0.5 mL by IntraMUSCular route once for 1 dose. Give second dose 2 months or after first dose      I have discussed the diagnosis with the patient and the intended plan as seen in the above orders. The patient has received an after-visit summary along with patient information handout. I have discussed medication side effects and warnings with the patient as well. Follow-up and Dispositions    · Return in about 1 year (around 12/5/2020) for Annual Exam - 30 minutes.            Rafa Taylor NP

## 2019-12-27 LAB
ALBUMIN SERPL-MCNC: 4.1 G/DL (ref 3.5–5.5)
ALBUMIN/GLOB SERPL: 1.6 {RATIO} (ref 1.2–2.2)
ALP SERPL-CCNC: 77 IU/L (ref 39–117)
ALT SERPL-CCNC: 25 IU/L (ref 0–32)
AST SERPL-CCNC: 27 IU/L (ref 0–40)
BILIRUB SERPL-MCNC: 0.4 MG/DL (ref 0–1.2)
BUN SERPL-MCNC: 12 MG/DL (ref 6–24)
BUN/CREAT SERPL: 15 (ref 9–23)
CALCIUM SERPL-MCNC: 9.3 MG/DL (ref 8.7–10.2)
CHLORIDE SERPL-SCNC: 101 MMOL/L (ref 96–106)
CHOLEST SERPL-MCNC: 173 MG/DL (ref 100–199)
CO2 SERPL-SCNC: 25 MMOL/L (ref 20–29)
CREAT SERPL-MCNC: 0.81 MG/DL (ref 0.57–1)
ERYTHROCYTE [DISTWIDTH] IN BLOOD BY AUTOMATED COUNT: 12.2 % (ref 12.3–15.4)
EST. AVERAGE GLUCOSE BLD GHB EST-MCNC: 114 MG/DL
GLOBULIN SER CALC-MCNC: 2.5 G/DL (ref 1.5–4.5)
GLUCOSE SERPL-MCNC: 86 MG/DL (ref 65–99)
HBA1C MFR BLD: 5.6 % (ref 4.8–5.6)
HCT VFR BLD AUTO: 38.4 % (ref 34–46.6)
HDLC SERPL-MCNC: 82 MG/DL
HGB BLD-MCNC: 12.8 G/DL (ref 11.1–15.9)
INTERPRETATION, 910389: NORMAL
LDLC SERPL CALC-MCNC: 82 MG/DL (ref 0–99)
MCH RBC QN AUTO: 27.1 PG (ref 26.6–33)
MCHC RBC AUTO-ENTMCNC: 33.3 G/DL (ref 31.5–35.7)
MCV RBC AUTO: 81 FL (ref 79–97)
PLATELET # BLD AUTO: 356 X10E3/UL (ref 150–450)
POTASSIUM SERPL-SCNC: 4.1 MMOL/L (ref 3.5–5.2)
PROT SERPL-MCNC: 6.6 G/DL (ref 6–8.5)
RBC # BLD AUTO: 4.72 X10E6/UL (ref 3.77–5.28)
SODIUM SERPL-SCNC: 139 MMOL/L (ref 134–144)
T4 FREE SERPL-MCNC: 1.19 NG/DL (ref 0.82–1.77)
TRIGL SERPL-MCNC: 45 MG/DL (ref 0–149)
TSH SERPL DL<=0.005 MIU/L-ACNC: 2.71 UIU/ML (ref 0.45–4.5)
VLDLC SERPL CALC-MCNC: 9 MG/DL (ref 5–40)
WBC # BLD AUTO: 5.8 X10E3/UL (ref 3.4–10.8)

## 2020-11-10 ENCOUNTER — OFFICE VISIT (OUTPATIENT)
Dept: CARDIOLOGY CLINIC | Age: 55
End: 2020-11-10
Payer: COMMERCIAL

## 2020-11-10 VITALS
HEART RATE: 54 BPM | SYSTOLIC BLOOD PRESSURE: 120 MMHG | BODY MASS INDEX: 29.53 KG/M2 | WEIGHT: 156.4 LBS | RESPIRATION RATE: 15 BRPM | HEIGHT: 61 IN | DIASTOLIC BLOOD PRESSURE: 80 MMHG | OXYGEN SATURATION: 98 %

## 2020-11-10 DIAGNOSIS — R01.1 SYSTOLIC EJECTION MURMUR: ICD-10-CM

## 2020-11-10 DIAGNOSIS — Z82.49 FAMILY HISTORY OF AORTIC VALVE DISORDER: ICD-10-CM

## 2020-11-10 DIAGNOSIS — R06.83 SNORING: ICD-10-CM

## 2020-11-10 DIAGNOSIS — G47.33 OSA (OBSTRUCTIVE SLEEP APNEA): ICD-10-CM

## 2020-11-10 DIAGNOSIS — I10 ESSENTIAL HYPERTENSION: Primary | ICD-10-CM

## 2020-11-10 PROCEDURE — 93000 ELECTROCARDIOGRAM COMPLETE: CPT | Performed by: INTERNAL MEDICINE

## 2020-11-10 PROCEDURE — 99214 OFFICE O/P EST MOD 30 MIN: CPT | Performed by: INTERNAL MEDICINE

## 2020-11-10 NOTE — LETTER
11/10/20 Patient: Ashlyn Mike YOB: 1965 Date of Visit: 11/10/2020 Roma Joshi NP 
[address] [id number] VIA In Basket Dear Roma Joshi NP, Thank you for referring Ms. Melania Mejias to [facility] for evaluation. My notes for this consultation are attached. If you have questions, please do not hesitate to call me. I look forward to following your patient along with you. Sincerely, Channing Peck MD

## 2020-11-10 NOTE — PROGRESS NOTES
[facility]  [phone]    HPI: Rubio Barraza, a 63y.o. year-old who presents for follow up regarding chest pain. No chest pain or palpitations  No dyspnea with exertion  She is staying active with cycling and weight training  No dizziness or syncope  No LE edema  She plans to have labs with Meri Rock NP next month   Stress level is average   Her mother is a patient of mine with AI and SSS and she needs an evaluation for this   She never had a sleep study done she does snore and suspect sleep apnea and it does run in the family  Reviewed risk factors she will proceed with testing    Also on her echo will check her pulmonary pressures and evaluate her heart murmur        Assessment/Plan:  1. Chest pain - not a problem now, treadmill stress test in 2018 without ischemia   -lipids at goal in 12/19   -she will follow up here again in 1 year   2. HTN - well controlled on HCTZ  -took Atenolol in the past but cannot remember why it was changed to HCTZ  3. Anxiety/Depression - followed by PCP/psych, encouraged her to exercise regularly for stress reduction in the past   4. Fam hx of aortic insufficiency in mother - 2/6 RICHARD heard on exam today, will check aortic valve with echo now  5. Body mass index is 29.55 kg/m². weight is stable  6. Snoring - will order sleep study to assess for MARY KATE, has family members with MARY KATE    Treadmill Stress 7/2018 - no evidence of ischemia    Soc Hx: no tobacco use, no etoh use  Fam Hx: no early CAD in the family    She  has a past medical history of Anxiety, Depression, Hearing aid worn, Herpes (1990), and Hypertension. Cardiovascular ROS: no chest pain or palpitations   Respiratory ROS: no cough, shortness of breath, or wheezing  Neurological ROS: no TIA or stroke symptoms  All other systems negative except as above.      PE  Vitals:    11/10/20 1001   Resp: 15   Weight: 156 lb 6.4 oz (70.9 kg)   Height: 5' 1\" (1.549 m)    Body mass index is 29.55 kg/m². General appearance - alert, well appearing, and in no distress  Mental status - affect appropriate to mood  Eyes - sclera anicteric, moist mucous membranes  Neck - supple  Lymphatics - not assessed  Chest - clear to auscultation, no wheezes, rales or rhonchi  Heart - normal rate, regular rhythm, normal S1, S2, 2/6 RICHARD at [facility]   Abdomen - soft, nontender, nondistended  Back exam - full range of motion, no tenderness  Neurological - cranial nerves II through XII grossly intact, no focal deficit  Musculoskeletal - no muscular tenderness noted, normal strength  Extremities - peripheral pulses normal, no pedal edema  Skin - normal coloration  no rashes    12 lead ECG: sinus bradycardia, non-specific ST-T wave changes    Recent Labs:  Lab Results   Component Value Date/Time    Cholesterol, total 173 12/26/2019 10:28 AM    HDL Cholesterol 82 12/26/2019 10:28 AM    LDL, calculated 82 12/26/2019 10:28 AM    Triglyceride 45 12/26/2019 10:28 AM     Lab Results   Component Value Date/Time    Creatinine 0.81 12/26/2019 10:28 AM     Lab Results   Component Value Date/Time    BUN 12 12/26/2019 10:28 AM     Lab Results   Component Value Date/Time    Potassium 4.1 12/26/2019 10:28 AM     Lab Results   Component Value Date/Time    Hemoglobin A1c 5.6 12/26/2019 10:28 AM     Lab Results   Component Value Date/Time    HGB 12.8 12/26/2019 10:28 AM     Lab Results   Component Value Date/Time    PLATELET 460 42/30/7956 10:28 AM       Reviewed:  Past Medical History:   Diagnosis Date    Anxiety     Depression     Hearing aid worn     Bilateral    Herpes 1990    Hypertension      Social History     Tobacco Use   Smoking Status Never Smoker   Smokeless Tobacco Never Used     Social History     Substance and Sexual Activity   Alcohol Use Yes    Comment: OCC. No Known Allergies    Current Outpatient Medications   Medication Sig    PARoxetine (PAXIL) 30 mg tablet Take 2 Tabs by mouth nightly.     hydroCHLOROthiazide (HYDRODIURIL) 25 mg tablet Take 1 Tab by mouth daily. For blood pressure    buPROPion XL (WELLBUTRIN XL) 300 mg XL tablet TAKE ONE TABLET BY MOUTH EVERY MORNING    ALPRAZolam (XANAX) 0.5 mg tablet Take 1 Tab by mouth daily as needed for Anxiety.  ascorbic acid (VITAMIN C PO) Take 1 Tab by mouth daily.  multivitamin (ONE A DAY) tablet Take 1 tablet by mouth daily. No current facility-administered medications for this visit.         Justus Chanel NP  [facility] [address] [place] [address] [address], [address] [address]  [facility], [facility]  [phone]

## 2020-11-22 DIAGNOSIS — F41.8 MIXED ANXIETY DEPRESSIVE DISORDER: ICD-10-CM

## 2020-11-22 DIAGNOSIS — I10 ESSENTIAL HYPERTENSION: ICD-10-CM

## 2020-11-24 RX ORDER — PAROXETINE 30 MG/1
TABLET, FILM COATED ORAL
Qty: 62 TAB | Refills: 2 | Status: SHIPPED | OUTPATIENT
Start: 2020-11-24 | End: 2020-12-08 | Stop reason: SDUPTHER

## 2020-11-24 RX ORDER — HYDROCHLOROTHIAZIDE 25 MG/1
TABLET ORAL
Qty: 31 TAB | Refills: 2 | Status: SHIPPED | OUTPATIENT
Start: 2020-11-24 | End: 2020-12-08 | Stop reason: SDUPTHER

## 2020-11-24 RX ORDER — BUPROPION HYDROCHLORIDE 300 MG/1
TABLET ORAL
Qty: 31 TAB | Refills: 2 | Status: SHIPPED | OUTPATIENT
Start: 2020-11-24 | End: 2020-12-08 | Stop reason: SDUPTHER

## 2020-12-08 ENCOUNTER — OFFICE VISIT (OUTPATIENT)
Dept: FAMILY MEDICINE CLINIC | Age: 55
End: 2020-12-08
Payer: COMMERCIAL

## 2020-12-08 VITALS
TEMPERATURE: 98 F | WEIGHT: 157.8 LBS | BODY MASS INDEX: 29.79 KG/M2 | RESPIRATION RATE: 16 BRPM | HEART RATE: 62 BPM | DIASTOLIC BLOOD PRESSURE: 80 MMHG | HEIGHT: 61 IN | OXYGEN SATURATION: 95 % | SYSTOLIC BLOOD PRESSURE: 127 MMHG

## 2020-12-08 DIAGNOSIS — I10 ESSENTIAL HYPERTENSION: ICD-10-CM

## 2020-12-08 DIAGNOSIS — F41.8 MIXED ANXIETY DEPRESSIVE DISORDER: ICD-10-CM

## 2020-12-08 DIAGNOSIS — Z00.00 ROUTINE GENERAL MEDICAL EXAMINATION AT A HEALTH CARE FACILITY: Primary | ICD-10-CM

## 2020-12-08 PROCEDURE — 99396 PREV VISIT EST AGE 40-64: CPT | Performed by: NURSE PRACTITIONER

## 2020-12-08 RX ORDER — ALPRAZOLAM 0.5 MG/1
0.5 TABLET ORAL
Qty: 20 TAB | Refills: 0 | Status: SHIPPED | OUTPATIENT
Start: 2020-12-08 | End: 2021-11-24 | Stop reason: SDUPTHER

## 2020-12-08 RX ORDER — PAROXETINE 30 MG/1
TABLET, FILM COATED ORAL
Qty: 62 TAB | Refills: 11 | Status: SHIPPED | OUTPATIENT
Start: 2020-12-08 | End: 2021-11-24 | Stop reason: SDUPTHER

## 2020-12-08 RX ORDER — BUPROPION HYDROCHLORIDE 300 MG/1
300 TABLET ORAL
Qty: 31 TAB | Refills: 11 | Status: SHIPPED | OUTPATIENT
Start: 2020-12-08 | End: 2021-11-24 | Stop reason: SDUPTHER

## 2020-12-08 RX ORDER — HYDROCHLOROTHIAZIDE 25 MG/1
TABLET ORAL
Qty: 31 TAB | Refills: 11 | Status: SHIPPED | OUTPATIENT
Start: 2020-12-08 | End: 2021-11-24 | Stop reason: SDUPTHER

## 2020-12-08 NOTE — PROGRESS NOTES
Chief Complaint   Patient presents with    Complete Physical     1. Have you been to the ER, urgent care clinic since your last visit? Hospitalized since your last visit? No    2. Have you seen or consulted any other health care providers outside of the [address] [place] [address] since your last visit? Include any pap smears or colon screening.  Yes [facility]

## 2020-12-08 NOTE — PATIENT INSTRUCTIONS
Well Visit, Women 48 to 72: Care Instructions  Your Care Instructions     Physical exams can help you stay healthy. Your doctor has checked your overall health and may have suggested ways to take good care of yourself. He or she also may have recommended tests. At home, you can help prevent illness with healthy eating, regular exercise, and other steps. Follow-up care is a key part of your treatment and safety. Be sure to make and go to all appointments, and call your doctor if you are having problems. It's also a good idea to know your test results and keep a list of the medicines you take. How can you care for yourself at home? · Reach and stay at a healthy weight. This will lower your risk for many problems, such as obesity, diabetes, heart disease, and high blood pressure. · Get at least 30 minutes of exercise on most days of the week. Walking is a good choice. You also may want to do other activities, such as running, swimming, cycling, or playing tennis or team sports. · Do not smoke. Smoking can make health problems worse. If you need help quitting, talk to your doctor about stop-smoking programs and medicines. These can increase your chances of quitting for good. · Protect your skin from too much sun. When you're outdoors from 10 a.m. to 4 p.m., stay in the shade or cover up with clothing and a hat with a wide brim. Wear sunglasses that block UV rays. Even when it's cloudy, put broad-spectrum sunscreen (SPF 30 or higher) on any exposed skin. · See a dentist one or two times a year for checkups and to have your teeth cleaned. · Wear a seat belt in the car. Follow your doctor's advice about when to have certain tests. These tests can spot problems early. · Cholesterol. Your doctor will tell you how often to have this done based on your age, family history, or other things that can increase your risk for heart attack and stroke. · Blood pressure.  Have your blood pressure checked during a routine doctor visit. Your doctor will tell you how often to check your blood pressure based on your age, your blood pressure results, and other factors. · Mammogram. Ask your doctor how often you should have a mammogram, which is an X-ray of your breasts. A mammogram can spot breast cancer before it can be felt and when it is easiest to treat. · Pap test and pelvic exam. Ask your doctor how often you should have a Pap test. You may not need to have a Pap test as often as you used to. · Vision. Have your eyes checked every year or two or as often as your doctor suggests. Some experts recommend that you have yearly exams for glaucoma and other age-related eye problems starting at age 48. · Hearing. Tell your doctor if you notice any change in your hearing. You can have tests to find out how well you hear. · Diabetes. Ask your doctor whether you should have tests for diabetes. · Colorectal cancer. Your risk for colorectal cancer gets higher as you get older. Some experts say that adults should start regular screening at age 48 and stop at age 76. Others say to start before age 48 or continue after age 76. Talk with your doctor about your risk and when to start and stop screening. · Thyroid disease. Talk to your doctor about whether to have your thyroid checked as part of a regular physical exam. Women have an increased chance of a thyroid problem. · Osteoporosis. You should begin tests for bone density at age 72. If you are younger than 72, ask your doctor whether you have factors that may increase your risk for this disease. You may want to have this test before age 72. · Heart attack and stroke risk. At least every 4 to 6 years, you should have your risk for heart attack and stroke assessed. Your doctor uses factors such as your age, blood pressure, cholesterol, and whether you smoke or have diabetes to show what your risk for a heart attack or stroke is over the next 10 years.   When should you call for help?  Watch closely for changes in your health, and be sure to contact your doctor if you have any problems or symptoms that concern you. Where can you learn more? Go to http://www.gray.com/  Enter [id number] in the search box to learn more about \"Well Visit, Women 50 to 72: Care Instructions. \"  Current as of: May 27, 2020               Content Version: 12.6  © 2006-2020 [employer], [employer]. Care instructions adapted under license by [employer] (which disclaims liability or warranty for this information). If you have questions about a medical condition or this instruction, always ask your healthcare professional. [address] any warranty or liability for your use of this information.

## 2020-12-08 NOTE — PROGRESS NOTES
[facility] Note    Nadeen Varner is a 63 y.o. female who was seen in clinic today (12/8/2020). Subjective:  Cardiovascular Review:  The patient has hypertension. Diet and Lifestyle: generally follows a low fat low cholesterol diet, generally follows a low sodium diet, exercises regularly, nonsmoker  Home BP Monitoring: is well controlled at home, ranging 130/60's. Pertinent ROS: taking medications as instructed, no medication side effects noted, no TIA's, no chest pain on exertion, no dyspnea on exertion, no swelling of ankles.      Patient had colonoscopy in 8/2017 with normal findings.      Seen by GYN routinely. Menstrual cycles are irregular.       Anxiety  Patient complains of anxiety and depressive disorder, panic attacks, sleep disturbance. She has the following symptoms: insomnia, racing thoughts, psychomotor agitation, feelings of losing control, difficulty concentrating, depressed mood, anhedonia and weight loss. Onset of symptoms was approximately several years ago, gradually worsening since that time. She denies current suicidal and homicidal ideation. Treatment includes Paxil, Wellbutrin, Xanax and no other therapies. Currently seen by psychiatry, Dr Macey Cruz and counselor routinely.        Prior to Admission medications    Medication Sig Start Date End Date Taking? Authorizing Provider   hydroCHLOROthiazide (HYDRODIURIL) 25 mg tablet TAKE ONE TABLET BY MOUTH DAILY FOR BLOOD PRESSURE 12/8/20  Yes Bonilla Craig NP   buPROPion XL (WELLBUTRIN XL) 300 mg XL tablet Take 1 Tab by mouth every morning. For sleep 12/8/20  Yes Bonilla Craig NP   PARoxetine (PAXIL) 30 mg tablet TAKE TWO TABLETS BY MOUTH EVERY NIGHT AT BEDTIME 12/8/20  Yes Bonilla Craig NP   ALPRAZolam (Xanax) 0.5 mg tablet Take 1 Tab by mouth daily as needed for Anxiety. 12/8/20  Yes Bonilla Craig NP   ascorbic acid (VITAMIN C PO) Take 1 Tab by mouth daily.    Yes Provider, Historical   multivitamin (ONE A DAY) tablet Take 1 tablet by mouth daily. Yes Provider, Historical   hydroCHLOROthiazide (HYDRODIURIL) 25 mg tablet TAKE ONE TABLET BY MOUTH DAILY FOR BLOOD PRESSURE 11/24/20 12/8/20  Victorina Cr NP   buPROPion XL (WELLBUTRIN XL) 300 mg XL tablet TAKE ONE TABLET BY MOUTH EVERY MORNING 11/24/20 12/8/20  Victorina Cr NP   PARoxetine (PAXIL) 30 mg tablet TAKE TWO TABLETS BY MOUTH EVERY NIGHT AT BEDTIME 11/24/20 12/8/20  Victorina Cr NP   ALPRAZolam Jaylene Toco) 0.5 mg tablet Take 1 Tab by mouth daily as needed for Anxiety. 12/5/19 12/8/20  Victorina Cr NP          No Known Allergies        Review of Systems   Constitutional: Negative for malaise/fatigue and weight loss. HENT: Negative for hearing loss. Eyes: Negative for blurred vision. Respiratory: Negative for shortness of breath. Cardiovascular: Negative for chest pain, palpitations and leg swelling. Gastrointestinal: Negative for abdominal pain, constipation, diarrhea and heartburn. Genitourinary: Negative for frequency and urgency. Musculoskeletal: Negative for back pain, joint pain and myalgias. Neurological: Negative for dizziness, weakness and headaches. Endo/Heme/Allergies: Does not bruise/bleed easily. Psychiatric/Behavioral: Negative for depression. Objective:   Physical Exam  Vitals signs and nursing note reviewed. Constitutional:       General: She is not in acute distress. Appearance: She is well-developed. HENT:      Right Ear: Tympanic membrane and ear canal normal.      Left Ear: Tympanic membrane and ear canal normal.      Nose: No mucosal edema. Right Sinus: No maxillary sinus tenderness or frontal sinus tenderness. Left Sinus: No maxillary sinus tenderness or frontal sinus tenderness. Eyes:      Conjunctiva/sclera: Conjunctivae normal.      Pupils: Pupils are equal, round, and reactive to light.    Neck:      Musculoskeletal: Normal range of motion and neck supple. Thyroid: No thyromegaly. Vascular: No carotid bruit or JVD. Cardiovascular:      Rate and Rhythm: Normal rate and regular rhythm. Heart sounds: Normal heart sounds. No murmur. No friction rub. No gallop. Pulmonary:      Effort: Pulmonary effort is normal. No respiratory distress. Breath sounds: Normal breath sounds. No decreased breath sounds, wheezing or rhonchi. Abdominal:      General: Bowel sounds are normal. There is no distension. Palpations: Abdomen is soft. Tenderness: There is no abdominal tenderness. Lymphadenopathy:      Cervical: No cervical adenopathy. Neurological:      Mental Status: She is alert and oriented to person, place, and time. Psychiatric:         Behavior: Behavior normal.           Visit Vitals  BP (!) 127/92 (BP 1 Location: Left arm, BP Patient Position: Sitting)   Pulse 62   Temp 98 °F (36.7 °C) (Temporal)   Resp 16   Ht 5' 1\" (1.549 m)   Wt 157 lb 12.8 oz (71.6 kg)   LMP 11/04/2020 (Exact Date)   SpO2 95%   BMI 29.82 kg/m²       Assessment & Plan:  Diagnoses and all orders for this visit:    1. Routine general medical examination at a health care facility  Well adult, reviewed routine screenings as well as healthy diet and lifestyle practices  -     LIPID PANEL; Future  -     METABOLIC PANEL, COMPREHENSIVE; Future  -     HEMOGLOBIN A1C WITH EAG; Future  -     CBC WITH AUTOMATED DIFF; Future  -     TSH 3RD GENERATION; Future    2. Mixed anxiety depressive disorder  Stable, no changes to current therapy  -     buPROPion XL (WELLBUTRIN XL) 300 mg XL tablet; Take 1 Tab by mouth every morning. For sleep  -     PARoxetine (PAXIL) 30 mg tablet; TAKE TWO TABLETS BY MOUTH EVERY NIGHT AT BEDTIME  -     ALPRAZolam (Xanax) 0.5 mg tablet; Take 1 Tab by mouth daily as needed for Anxiety. 3. Essential hypertension  Well controlled, no medication changes.   -     hydroCHLOROthiazide (HYDRODIURIL) 25 mg tablet; TAKE ONE TABLET BY MOUTH DAILY FOR BLOOD PRESSURE      I have discussed the diagnosis with the patient and the intended plan as seen in the above orders. The patient has received an after-visit summary along with patient information handout. I have discussed medication side effects and warnings with the patient as well. Follow-up and Dispositions    · Return in about 1 year (around 12/8/2021) for Annual Exam - 30 minutes.            Alexander Li NP

## 2020-12-09 LAB
ALBUMIN SERPL-MCNC: 4 G/DL (ref 3.5–5)
ALBUMIN/GLOB SERPL: 1.1 {RATIO} (ref 1.1–2.2)
ALP SERPL-CCNC: 82 U/L (ref 45–117)
ALT SERPL-CCNC: 29 U/L (ref 12–78)
ANION GAP SERPL CALC-SCNC: 3 MMOL/L (ref 5–15)
AST SERPL-CCNC: 22 U/L (ref 15–37)
BASOPHILS # BLD: 0 K/UL (ref 0–0.1)
BASOPHILS NFR BLD: 0 % (ref 0–1)
BILIRUB SERPL-MCNC: 0.5 MG/DL (ref 0.2–1)
BUN SERPL-MCNC: 24 MG/DL (ref 6–20)
BUN/CREAT SERPL: 25 (ref 12–20)
CALCIUM SERPL-MCNC: 10 MG/DL (ref 8.5–10.1)
CHLORIDE SERPL-SCNC: 103 MMOL/L (ref 97–108)
CHOLEST SERPL-MCNC: 203 MG/DL
CO2 SERPL-SCNC: 33 MMOL/L (ref 21–32)
CREAT SERPL-MCNC: 0.96 MG/DL (ref 0.55–1.02)
DIFFERENTIAL METHOD BLD: NORMAL
EOSINOPHIL # BLD: 0.1 K/UL (ref 0–0.4)
EOSINOPHIL NFR BLD: 2 % (ref 0–7)
ERYTHROCYTE [DISTWIDTH] IN BLOOD BY AUTOMATED COUNT: 12.6 % (ref 11.5–14.5)
EST. AVERAGE GLUCOSE BLD GHB EST-MCNC: 105 MG/DL
GLOBULIN SER CALC-MCNC: 3.5 G/DL (ref 2–4)
GLUCOSE SERPL-MCNC: 85 MG/DL (ref 65–100)
HBA1C MFR BLD: 5.3 % (ref 4–5.6)
HCT VFR BLD AUTO: 43 % (ref 35–47)
HDLC SERPL-MCNC: 83 MG/DL
HDLC SERPL: 2.4 {RATIO} (ref 0–5)
HGB BLD-MCNC: 13 G/DL (ref 11.5–16)
IMM GRANULOCYTES # BLD AUTO: 0 K/UL (ref 0–0.04)
IMM GRANULOCYTES NFR BLD AUTO: 0 % (ref 0–0.5)
LDLC SERPL CALC-MCNC: 110 MG/DL (ref 0–100)
LIPID PROFILE,FLP: ABNORMAL
LYMPHOCYTES # BLD: 1.5 K/UL (ref 0.8–3.5)
LYMPHOCYTES NFR BLD: 31 % (ref 12–49)
MCH RBC QN AUTO: 27.3 PG (ref 26–34)
MCHC RBC AUTO-ENTMCNC: 30.2 G/DL (ref 30–36.5)
MCV RBC AUTO: 90.1 FL (ref 80–99)
MONOCYTES # BLD: 0.3 K/UL (ref 0–1)
MONOCYTES NFR BLD: 7 % (ref 5–13)
NEUTS SEG # BLD: 2.9 K/UL (ref 1.8–8)
NEUTS SEG NFR BLD: 60 % (ref 32–75)
NRBC # BLD: 0 K/UL (ref 0–0.01)
NRBC BLD-RTO: 0 PER 100 WBC
PLATELET # BLD AUTO: 342 K/UL (ref 150–400)
PMV BLD AUTO: 11 FL (ref 8.9–12.9)
POTASSIUM SERPL-SCNC: 4.4 MMOL/L (ref 3.5–5.1)
PROT SERPL-MCNC: 7.5 G/DL (ref 6.4–8.2)
RBC # BLD AUTO: 4.77 M/UL (ref 3.8–5.2)
SODIUM SERPL-SCNC: 139 MMOL/L (ref 136–145)
TRIGL SERPL-MCNC: 50 MG/DL (ref ?–150)
TSH SERPL DL<=0.05 MIU/L-ACNC: 2.02 UIU/ML (ref 0.36–3.74)
VLDLC SERPL CALC-MCNC: 10 MG/DL
WBC # BLD AUTO: 4.9 K/UL (ref 3.6–11)

## 2020-12-29 ENCOUNTER — ANCILLARY PROCEDURE (OUTPATIENT)
Dept: CARDIOLOGY CLINIC | Age: 55
End: 2020-12-29
Payer: COMMERCIAL

## 2020-12-29 VITALS
HEIGHT: 61 IN | BODY MASS INDEX: 29.64 KG/M2 | DIASTOLIC BLOOD PRESSURE: 80 MMHG | WEIGHT: 157 LBS | SYSTOLIC BLOOD PRESSURE: 128 MMHG

## 2020-12-29 DIAGNOSIS — Z82.49 FAMILY HISTORY OF AORTIC VALVE DISORDER: ICD-10-CM

## 2020-12-29 PROCEDURE — 93306 TTE W/DOPPLER COMPLETE: CPT | Performed by: INTERNAL MEDICINE

## 2021-01-01 LAB
AV R PG: 64.88 MMHG
ECHO AO ASC DIAM: 3.22 CM
ECHO AO ROOT DIAM: 3.27 CM
ECHO AR MAX VEL PISA: 402.75 CM/S
ECHO AV AREA PEAK VELOCITY: 2.42 CM2
ECHO AV AREA VTI: 2.53 CM2
ECHO AV AREA/BSA PEAK VELOCITY: 1.4 CM2/M2
ECHO AV AREA/BSA VTI: 1.5 CM2/M2
ECHO AV MEAN GRADIENT: 1.68 MMHG
ECHO AV PEAK GRADIENT: 4.01 MMHG
ECHO AV PEAK VELOCITY: 100.16 CM/S
ECHO AV REGURGITANT PHT: 900.36 MS
ECHO AV VTI: 19.99 CM
ECHO EST RA PRESSURE: 3 MMHG
ECHO IVC PROX: 1.49 CM
ECHO LA AREA 4C: 20.13 CM2
ECHO LA MAJOR AXIS: 3.87 CM
ECHO LA MINOR AXIS: 2.27 CM
ECHO LA VOL 2C: 34.23 ML (ref 22–52)
ECHO LA VOL 4C: 57.12 ML (ref 22–52)
ECHO LA VOL BP: 50.36 ML (ref 22–52)
ECHO LA VOL/BSA BIPLANE: 29.55 ML/M2 (ref 16–28)
ECHO LA VOLUME INDEX A2C: 20.08 ML/M2 (ref 16–28)
ECHO LA VOLUME INDEX A4C: 33.51 ML/M2 (ref 16–28)
ECHO LV E' LATERAL VELOCITY: 6.8 CM/S
ECHO LV E' SEPTAL VELOCITY: 6.93 CM/S
ECHO LV EDV A2C: 71.49 ML
ECHO LV EDV A4C: 88.3 ML
ECHO LV EDV BP: 79.33 ML (ref 56–104)
ECHO LV EDV INDEX A4C: 51.8 ML/M2
ECHO LV EDV INDEX BP: 46.5 ML/M2
ECHO LV EDV NDEX A2C: 41.9 ML/M2
ECHO LV EJECTION FRACTION A2C: 60 PERCENT
ECHO LV EJECTION FRACTION A4C: 62 PERCENT
ECHO LV EJECTION FRACTION BIPLANE: 60.7 PERCENT (ref 55–100)
ECHO LV ESV A2C: 28.28 ML
ECHO LV ESV A4C: 33.88 ML
ECHO LV ESV BP: 31.22 ML (ref 19–49)
ECHO LV ESV INDEX A2C: 16.6 ML/M2
ECHO LV ESV INDEX A4C: 19.9 ML/M2
ECHO LV ESV INDEX BP: 18.3 ML/M2
ECHO LV INTERNAL DIMENSION DIASTOLIC: 4.42 CM (ref 3.9–5.3)
ECHO LV INTERNAL DIMENSION SYSTOLIC: 2.57 CM
ECHO LV IVSD: 0.91 CM (ref 0.6–0.9)
ECHO LV MASS 2D: 131.9 G (ref 67–162)
ECHO LV MASS INDEX 2D: 77.4 G/M2 (ref 43–95)
ECHO LV POSTERIOR WALL DIASTOLIC: 0.92 CM (ref 0.6–0.9)
ECHO LVOT DIAM: 1.94 CM
ECHO LVOT PEAK GRADIENT: 2.67 MMHG
ECHO LVOT PEAK VELOCITY: 81.65 CM/S
ECHO LVOT SV: 50.6 ML
ECHO LVOT VTI: 17.07 CM
ECHO MV A VELOCITY: 77.71 CM/S
ECHO MV AREA PHT: 4.78 CM2
ECHO MV E DECELERATION TIME (DT): 158.59 MS
ECHO MV E VELOCITY: 45.04 CM/S
ECHO MV E/A RATIO: 0.58
ECHO MV E/E' LATERAL: 6.62
ECHO MV E/E' RATIO (AVERAGED): 6.56
ECHO MV E/E' SEPTAL: 6.5
ECHO MV PRESSURE HALF TIME (PHT): 45.99 MS
ECHO RA MAJOR AXIS: 3.3 CM
ECHO RIGHT VENTRICULAR SYSTOLIC PRESSURE (RVSP): 23.63 MMHG
ECHO RV INTERNAL DIMENSION: 3.27 CM
ECHO RV TAPSE: 1.92 CM (ref 1.5–2)
ECHO TV REGURGITANT MAX VELOCITY: 227.08 CM/S
ECHO TV REGURGITANT PEAK GRADIENT: 20.63 MMHG
LA VOL DISK BP: 46.82 ML (ref 22–52)
LVOT MG: 1.21 MMHG

## 2021-01-05 ENCOUNTER — TELEPHONE (OUTPATIENT)
Dept: CARDIOLOGY CLINIC | Age: 56
End: 2021-01-05

## 2021-01-05 NOTE — TELEPHONE ENCOUNTER
----- Message from Smita Perez NP sent at 1/3/2021  8:46 PM EST -----  Echo is essentially normal.  She has a normal EF, mild AI and a normal PA pressure. Above echo results given to patient.  2 pt identifiers used

## 2021-06-25 ENCOUNTER — VIRTUAL VISIT (OUTPATIENT)
Dept: FAMILY MEDICINE CLINIC | Age: 56
End: 2021-06-25
Payer: COMMERCIAL

## 2021-06-25 DIAGNOSIS — J06.9 UPPER RESPIRATORY TRACT INFECTION, UNSPECIFIED TYPE: Primary | ICD-10-CM

## 2021-06-25 PROCEDURE — 99213 OFFICE O/P EST LOW 20 MIN: CPT | Performed by: FAMILY MEDICINE

## 2021-06-25 RX ORDER — AMOXICILLIN AND CLAVULANATE POTASSIUM 875; 125 MG/1; MG/1
1 TABLET, FILM COATED ORAL EVERY 12 HOURS
Qty: 10 TABLET | Refills: 0 | Status: SHIPPED | OUTPATIENT
Start: 2021-06-25 | End: 2021-06-30

## 2021-06-25 NOTE — PROGRESS NOTES
Stefania Lewis, who was evaluated through a synchronous (real-time) audio-video encounter, and/or her healthcare decision maker, is aware that it is a billable service, with coverage as determined by her insurance carrier. She provided verbal consent to proceed: YES, and patient identification was verified. It was conducted pursuant to the emergency declaration under the [facility], [facility] authority and the [employer] and [employer] Act. A caregiver was present when appropriate. Ability to conduct physical exam was limited. I was at home. The patient was at home. This virtual visit was conducted via [employer]. Pursuant to the emergency declaration under the [address] [place] [address], [address] [place] [address] authority and the [email or web address] and [employer] Act, this Virtual  Visit was conducted to reduce the patient's risk of exposure to COVID-19 and provide continuity of care for an established patient. Services were provided through a video synchronous discussion virtually to substitute for in-person clinic visit. Due to this being a TeleHealth evaluation, many elements of the physical examination are unable to be assessed. Total Time: minutes: 5-10 minutes. Miranda Willett MD    [phone]  Subjective:   Stefania Lewis was seen for:    5 days hx of dry cough, chest congestion, and now bilateral ear pain/pressure. Denies fevers, GI symptoms, body aches. Has gotten the COVID vaccine. Using Dayquil and Nyquil which helps. Prior to Admission medications    Medication Sig Start Date End Date Taking? Authorizing Provider   hydroCHLOROthiazide (HYDRODIURIL) 25 mg tablet TAKE ONE TABLET BY MOUTH DAILY FOR BLOOD PRESSURE 12/8/20   Salma Goldberg NP   buPROPion XL (WELLBUTRIN XL) 300 mg XL tablet Take 1 Tab by mouth every morning.  For sleep 12/8/20   Salma Goldberg NP PARoxetine (PAXIL) 30 mg tablet TAKE TWO TABLETS BY MOUTH EVERY NIGHT AT BEDTIME 12/8/20   Nixon Lucero NP   ALPRAZolam (Xanax) 0.5 mg tablet Take 1 Tab by mouth daily as needed for Anxiety. 12/8/20   Nixon Lucero NP   ascorbic acid (VITAMIN C PO) Take 1 Tab by mouth daily. Provider, Historical   multivitamin (ONE A DAY) tablet Take 1 tablet by mouth daily. Provider, Historical       No Known Allergies    Review of Systems   Constitutional: Negative for fever, malaise/fatigue and weight loss. HENT: Positive for congestion and ear pain. Respiratory: Positive for cough. Negative for hemoptysis, shortness of breath and wheezing. Cardiovascular: Negative for chest pain, palpitations, leg swelling and PND. Gastrointestinal: Negative for abdominal pain, constipation, diarrhea, nausea and vomiting. Physical Exam:     No flowsheet data found. General: alert, cooperative, no distress   Mental  status: normal mood, behavior, speech, dress, motor activity, and thought processes, able to follow commands   HENT: NCAT   Neck: no visualized mass   Resp: no respiratory distress   Neuro: no gross deficits   Skin: no discoloration or lesions of concern on visible areas   Psychiatric: normal affect, consistent with stated mood, no evidence of hallucinations       Assessment & Plan:   Aneta Alex is a 61 y.o. female who presents today for:    1. Upper respiratory tract infection, unspecified type  Recommend sinus rinse and Flonase. Recommend conservative therapies as discussed. If symptoms worsen, discuss use of delayed antibiotic prescription. Reviewed to take abx with food and probiotic/yogurt daily. - amoxicillin-clavulanate (Augmentin) 875-125 mg per tablet; Take 1 Tablet by mouth every twelve (12) hours for 5 days. Dispense: 10 Tablet; Refill: 0       There are no discontinued medications.         Treatment risks/benefits/costs/interactions/alternatives discussed with patient. Advised patient to call back or return to office if symptoms worsen/change/persist. If patient cannot reach us or should anything more severe/urgent arise he/she should proceed directly to the nearest emergency department. Discussed expected course/resolution/complications of diagnosis in detail with patient. Patient expressed understanding with the diagnosis and plan. Telly Martinez M.D.

## 2021-07-20 ENCOUNTER — OFFICE VISIT (OUTPATIENT)
Dept: FAMILY MEDICINE CLINIC | Age: 56
End: 2021-07-20
Payer: COMMERCIAL

## 2021-07-20 VITALS
BODY MASS INDEX: 30.02 KG/M2 | DIASTOLIC BLOOD PRESSURE: 72 MMHG | WEIGHT: 159 LBS | RESPIRATION RATE: 16 BRPM | TEMPERATURE: 98.3 F | HEART RATE: 58 BPM | OXYGEN SATURATION: 98 % | HEIGHT: 61 IN | SYSTOLIC BLOOD PRESSURE: 118 MMHG

## 2021-07-20 DIAGNOSIS — M65.342 TRIGGER FINGER, LEFT RING FINGER: ICD-10-CM

## 2021-07-20 DIAGNOSIS — H65.93 BILATERAL OTITIS MEDIA WITH EFFUSION: Primary | ICD-10-CM

## 2021-07-20 PROCEDURE — 99213 OFFICE O/P EST LOW 20 MIN: CPT | Performed by: FAMILY MEDICINE

## 2021-07-20 RX ORDER — ARIPIPRAZOLE 2 MG/1
2 TABLET ORAL DAILY
COMMUNITY
Start: 2021-07-11 | End: 2021-11-24 | Stop reason: SDUPTHER

## 2021-07-20 NOTE — PROGRESS NOTES
Chief Complaint   Patient presents with    Ear Fullness     bilateral      1. Have you been to the ER, urgent care clinic since your last visit? Hospitalized since your last visit? No    2. Have you seen or consulted any other health care providers outside of the [address] [place] [address] since your last visit? Include any pap smears or colon screening.  No

## 2021-07-20 NOTE — PROGRESS NOTES
Patient Name: Isaias Durham   MRN: [id number]    Khalida Melendez is a 61 y.o. female who presents with the following:     Since her viral cold a few weeks ago, continues to have bilateral ear fullness. Never took abx. Did use Flonase for a short while. Denies other symptoms. Several month hx of difficulty of extending her left 4th finger. Seems to get stuck. There is a bump at the base of her finger along her palm. Review of Systems   Constitutional: Negative for fever, malaise/fatigue and weight loss. HENT: Negative for ear discharge, ear pain and tinnitus. Respiratory: Negative for cough, hemoptysis, shortness of breath and wheezing. Cardiovascular: Negative for chest pain, palpitations, leg swelling and PND. Gastrointestinal: Negative for abdominal pain, constipation, diarrhea, nausea and vomiting. The patient's medications, allergies, past medical history, surgical history, family history and social history were reviewed and updated where appropriate. Current Outpatient Medications:     ARIPiprazole (ABILIFY) 2 mg tablet, Take 2 mg by mouth daily. , Disp: , Rfl:     hydroCHLOROthiazide (HYDRODIURIL) 25 mg tablet, TAKE ONE TABLET BY MOUTH DAILY FOR BLOOD PRESSURE, Disp: 31 Tab, Rfl: 11    buPROPion XL (WELLBUTRIN XL) 300 mg XL tablet, Take 1 Tab by mouth every morning. For sleep, Disp: 31 Tab, Rfl: 11    PARoxetine (PAXIL) 30 mg tablet, TAKE TWO TABLETS BY MOUTH EVERY NIGHT AT BEDTIME, Disp: 62 Tab, Rfl: 11    ALPRAZolam (Xanax) 0.5 mg tablet, Take 1 Tab by mouth daily as needed for Anxiety. , Disp: 20 Tab, Rfl: 0    ascorbic acid (VITAMIN C PO), Take 1 Tab by mouth daily. , Disp: , Rfl:     multivitamin (ONE A DAY) tablet, Take 1 tablet by mouth daily. , Disp: , Rfl:     No Known Allergies      OBJECTIVE    Visit Vitals  /72 (BP 1 Location: Left arm, BP Patient Position: Sitting, BP Cuff Size: Adult)   Pulse (!) 58   Temp 98.3 °F (36.8 °C) (Temporal)   Resp 16 Ht 5' 1\" (1.549 m)   Wt 159 lb (72.1 kg)   SpO2 98%   BMI 30.04 kg/m²       Physical Exam  Vitals and nursing note reviewed. Constitutional:       General: She is not in acute distress. Appearance: Normal appearance. She is not toxic-appearing. HENT:      Head: Normocephalic and atraumatic. Right Ear: Hearing, ear canal and external ear normal. A middle ear effusion is present. Tympanic membrane is not perforated or erythematous. Left Ear: Hearing, ear canal and external ear normal. A middle ear effusion is present. Tympanic membrane is not perforated or erythematous. Eyes:      Pupils: Pupils are equal, round, and reactive to light. Pulmonary:      Effort: Pulmonary effort is normal.   Musculoskeletal:      Left hand: Deformity (palpable cyst at base of 4th digit along flexor tendon) present. Decreased range of motion (decreased extension). Skin:     General: Skin is warm and dry. Neurological:      Mental Status: She is alert. Mental status is at baseline. Psychiatric:         Mood and Affect: Mood normal.         Behavior: Behavior normal.           ASSESSMENT AND PLAN  Dioni Brand is a 61 y.o. female who presents today for:    1. Bilateral otitis media with effusion  Recommend Flonase/antihistamine for another month. If persistent, consider abx for AOM. 2. Trigger finger, left ring finger  - REFERRAL TO ORTHOPEDIC SURGERY       There are no discontinued medications. Treatment risks/benefits/costs/interactions/alternatives discussed with patient. Advised patient to call back or return to office if symptoms worsen/change/persist. If patient cannot reach us or should anything more severe/urgent arise he/she should proceed directly to the nearest emergency department. Discussed expected course/resolution/complications of diagnosis in detail with patient. Patient expressed understanding with the diagnosis and plan. Telly Brown M.D.

## 2021-11-09 ENCOUNTER — TELEPHONE (OUTPATIENT)
Dept: FAMILY MEDICINE CLINIC | Age: 56
End: 2021-11-09

## 2021-11-09 NOTE — TELEPHONE ENCOUNTER
----- Message from Kym Mirza sent at 11/9/2021  4:10 PM EST -----  Subject: Appointment Request    Reason for Call: New Patient Request Appointment    QUESTIONS  Type of Appointment? New Patient/New to Provider  Reason for appointment request? No appointments available during search  Additional Information for Provider? pt. is looking to schedule a new pt.,   [facility] appt. pt. is available anytime after 11/20/21. Unable to book,   no one's schedules would populate.   ---------------------------------------------------------------------------  --------------  CALL BACK INFO  What is the best way for the office to contact you? OK to leave message on   voicemail  Preferred Call Back Phone Number? [phone]  ---------------------------------------------------------------------------  --------------  SCRIPT ANSWERS  Relationship to Patient? Self  Specialty Confirmation? Primary Care  (Is the patient requesting to see the provider for a procedure?)? No  (Is the patient requesting to see the provider urgently  today or   tomorrow. )? No  Have you been diagnosed with, awaiting test results for, or told that you   are suspected of having COVID-19 (Coronavirus)? (If patient has tested   negative or was tested as a requirement for work, school, or travel and   not based on symptoms, answer no)? No  Within the past two weeks have you developed any of the following symptoms   (answer no if symptoms have been present longer than 2 weeks or began   more than 2 weeks ago)? Fever or Chills, Cough, Shortness of breath or   difficulty breathing, Loss of taste or smell, Sore throat, Nasal   congestion, Sneezing or runny nose, Fatigue or generalized body aches   (answer no if pain is specific to a body part e.g. back pain), Diarrhea,   Headache? No  Have you had close contact with someone with COVID-19 in the last 14 days? No  (Service Expert  click yes below to proceed with [employer] As Usual   Scheduling)? Yes  Have you been diagnosed with, awaiting test results for, or told that you   are suspected of having COVID-19 (Coronavirus)? (If patient has tested   negative or was tested as a requirement for work, school, or travel and   not based on symptoms, answer no)? No  Within the past two weeks have you developed any of the following symptoms   (answer no if symptoms have been present longer than 2 weeks or began   more than 2 weeks ago)? Fever or Chills, Cough, Shortness of breath or   difficulty breathing, Loss of taste or smell, Sore throat, Nasal   congestion, Sneezing or runny nose, Fatigue or generalized body aches   (answer no if pain is specific to a body part e.g. back pain), Diarrhea,   Headache? No  Have you had close contact with someone with COVID-19 in the last 14 days? No  (Service Expert  click yes below to proceed with [employer] As Usual   Scheduling)? Yes  Is this the first appointment to establish care for a ?  No

## 2021-11-15 ENCOUNTER — DOCUMENTATION ONLY (OUTPATIENT)
Dept: CARDIOLOGY | Age: 56
End: 2021-11-15

## 2021-11-15 NOTE — PROGRESS NOTES
HPI: John Lennon, a 63y.o. year-old who presents for follow up regarding chest pain. Last Seen November 2020. She reports no new symptoms. did the 8k this weekend and felt well with that. At home /87 and doing great. She will see her new NP next month for labs. Cholesterol over 200 last year, due to recheck. No chest pain or palpitations  No dyspnea with exertion  She is staying active with cycling and weight training  No dizziness or syncope  No LE edema      Stress level is average   Her mother is a patient of mine with AI and SSS and she needs an evaluation for this   She never had a sleep study done she does snore and suspect sleep apnea and it does run in the family  Reviewed risk factors she will proceed with testing        Assessment/Plan:  1. Chest pain - not a problem now, treadmill stress test in 2018 without ischemia   -lipids at goal in 12/19   -she will follow up here again in 1 year   2. HTN - well controlled on HCTZ  -took Atenolol in the past but cannot remember why it was changed to HCTZ  3. Anxiety/Depression - followed by PCP/psych, encouraged her to exercise regularly for stress reduction in the past   4. Fam hx of aortic insufficiency in mother - 2/6 RICHARD heard on exam, sclerosis without stenosis, mild AI  5. Body mass index is 29.55 kg/m². weight is stable  6. Snoring - will order sleep study to assess for MARY KATE, has family members with MARY KATE       12/20 Echo EF 60-65% mild AI. nl PAP  Treadmill Stress 7/2018 - no evidence of ischemia     Soc Hx: no tobacco use, no etoh use  Fam Hx: no early CAD in the family     She  has a past medical history of Anxiety, Depression, Hearing aid worn, Herpes (1990), and Hypertension. Cardiovascular ROS: no chest pain or palpitations   Respiratory ROS: no cough, shortness of breath, or wheezing  Neurological ROS: no TIA or stroke symptoms  All other systems negative except as above.

## 2021-11-24 ENCOUNTER — OFFICE VISIT (OUTPATIENT)
Dept: FAMILY MEDICINE CLINIC | Age: 56
End: 2021-11-24
Payer: COMMERCIAL

## 2021-11-24 VITALS
SYSTOLIC BLOOD PRESSURE: 112 MMHG | TEMPERATURE: 97.3 F | HEART RATE: 50 BPM | DIASTOLIC BLOOD PRESSURE: 70 MMHG | HEIGHT: 61 IN | RESPIRATION RATE: 18 BRPM | BODY MASS INDEX: 30.21 KG/M2 | WEIGHT: 160 LBS | OXYGEN SATURATION: 98 %

## 2021-11-24 DIAGNOSIS — F41.8 MIXED ANXIETY DEPRESSIVE DISORDER: ICD-10-CM

## 2021-11-24 DIAGNOSIS — I10 ESSENTIAL HYPERTENSION: ICD-10-CM

## 2021-11-24 DIAGNOSIS — N62 LARGE BREASTS: Primary | ICD-10-CM

## 2021-11-24 LAB
ALBUMIN SERPL-MCNC: 3.7 G/DL (ref 3.5–5)
ALBUMIN/GLOB SERPL: 1.1 {RATIO} (ref 1.1–2.2)
ALP SERPL-CCNC: 77 U/L (ref 45–117)
ALT SERPL-CCNC: 31 U/L (ref 12–78)
ANION GAP SERPL CALC-SCNC: 2 MMOL/L (ref 5–15)
AST SERPL-CCNC: 21 U/L (ref 15–37)
BILIRUB SERPL-MCNC: 0.5 MG/DL (ref 0.2–1)
BUN SERPL-MCNC: 21 MG/DL (ref 6–20)
BUN/CREAT SERPL: 26 (ref 12–20)
CALCIUM SERPL-MCNC: 9.6 MG/DL (ref 8.5–10.1)
CHLORIDE SERPL-SCNC: 103 MMOL/L (ref 97–108)
CHOLEST SERPL-MCNC: 203 MG/DL
CO2 SERPL-SCNC: 32 MMOL/L (ref 21–32)
CREAT SERPL-MCNC: 0.82 MG/DL (ref 0.55–1.02)
GLOBULIN SER CALC-MCNC: 3.4 G/DL (ref 2–4)
GLUCOSE SERPL-MCNC: 83 MG/DL (ref 65–100)
HDLC SERPL-MCNC: 82 MG/DL
HDLC SERPL: 2.5 {RATIO} (ref 0–5)
LDLC SERPL CALC-MCNC: 109.6 MG/DL (ref 0–100)
POTASSIUM SERPL-SCNC: 4.6 MMOL/L (ref 3.5–5.1)
PROT SERPL-MCNC: 7.1 G/DL (ref 6.4–8.2)
SODIUM SERPL-SCNC: 137 MMOL/L (ref 136–145)
TRIGL SERPL-MCNC: 57 MG/DL (ref ?–150)
VLDLC SERPL CALC-MCNC: 11.4 MG/DL

## 2021-11-24 PROCEDURE — 99213 OFFICE O/P EST LOW 20 MIN: CPT | Performed by: NURSE PRACTITIONER

## 2021-11-24 RX ORDER — ARIPIPRAZOLE 2 MG/1
1 TABLET ORAL DAILY
Qty: 90 TABLET | Refills: 0 | Status: SHIPPED | OUTPATIENT
Start: 2021-11-24 | End: 2022-06-06

## 2021-11-24 RX ORDER — ALPRAZOLAM 0.5 MG/1
0.5 TABLET ORAL
Qty: 20 TABLET | Refills: 0 | Status: SHIPPED | OUTPATIENT
Start: 2021-11-24

## 2021-11-24 RX ORDER — BUPROPION HYDROCHLORIDE 300 MG/1
300 TABLET ORAL
Qty: 90 TABLET | Refills: 0 | Status: SHIPPED | OUTPATIENT
Start: 2021-11-24 | End: 2022-03-01

## 2021-11-24 RX ORDER — HYDROCHLOROTHIAZIDE 25 MG/1
TABLET ORAL
Qty: 90 TABLET | Refills: 1 | Status: SHIPPED | OUTPATIENT
Start: 2021-11-24 | End: 2022-06-06

## 2021-11-24 RX ORDER — PAROXETINE 30 MG/1
TABLET, FILM COATED ORAL
Qty: 120 TABLET | Refills: 1 | Status: SHIPPED | OUTPATIENT
Start: 2021-11-24 | End: 2022-04-20

## 2021-11-24 NOTE — PATIENT INSTRUCTIONS

## 2021-11-24 NOTE — PROGRESS NOTES
Identified pt with two pt identifiers(name and ). Reviewed record in preparation for visit and have obtained necessary documentation. Chief Complaint   Patient presents with    Complete Physical    Establish Care        Vitals:    21 1042   Weight: 160 lb (72.6 kg)   Height: 5' 1\" (1.549 m)   PainSc:   0 - No pain       Health Maintenance Due   Topic    Cervical cancer screen     Shingrix Vaccine Age 50> (1 of 2)    Flu Vaccine (1)       Coordination of Care Questionnaire:  :   1) Have you been to an emergency room, urgent care, or hospitalized since your last visit? If yes, where when, and reason for visit? no       2. Have seen or consulted any other health care provider since your last visit? If yes, where when, and reason for visit? NO    3. For patients over 45: Has the patient had a colonoscopy? Yes, HM satisfied with blue hyperlink     If the patient is female:    4. For patients over 40: Has the patient had a mammogram? Yes, HM satisfied with blue hyperlink    5. For patients over 21: Has the patient had a pap smear? Yes, but HM not satisfied. Rooming MA/LPN to request most recent results [facility]  Pap    Patient is accompanied by self I have received verbal consent from Patrice Saul to discuss any/all medical information while they are present in the room.

## 2021-11-24 NOTE — PROGRESS NOTES
[facility] Note     Lukasz Culp (: 1965) is a 61 y.o. female, established patient, here for evaluation of the following chief complaint(s):  Complete Physical and Establish Care       ASSESSMENT/PLAN:  1. Large breasts  -     REFERRAL TO BREAST SURGERY    2. Mixed anxiety depressive disorder  -     ALPRAZolam (Xanax) 0.5 mg tablet; Take 1 Tablet by mouth daily as needed for Anxiety., Normal, Disp-20 Tablet, R-0  -     buPROPion XL (WELLBUTRIN XL) 300 mg XL tablet; Take 1 Tablet by mouth every morning. For sleep, Normal, Disp-90 Tablet, R-0  -     PARoxetine (PAXIL) 30 mg tablet; TAKE TWO TABLETS BY MOUTH EVERY NIGHT AT BEDTIME, Normal, Disp-120 Tablet, R-1  - Discussed establishing care with new Psych provider for ongoing medication management    3. Essential hypertension  -     hydroCHLOROthiazide (HYDRODIURIL) 25 mg tablet; TAKE ONE TABLET BY MOUTH DAILY FOR BLOOD PRESSURE, Normal, Disp-90 Tablet, R-1  -     METABOLIC PANEL, COMPREHENSIVE; Future  -     LIPID PANEL; Future  - Home BP logs provided  -Diet and lifestyle modification encouraged for weight loss and chronic disease prevention/ management      Return in about 1 year (around 2022). SUBJECTIVE/OBJECTIVE:    Lukasz Culp is a 61 y.o. female seen today for HTN, anxiety and depression. Cardiovascular Review:  She has hypertension. Followed by Dr. Danyel Scott. Diet and Lifestyle: generally follows a low fat low cholesterol diet, exercises regularly, nonsmoker she does step aerobics and strength training. Home BP Monitoring: is not measured at home but just purchased a new cuff to start trending BP  Pertinent ROS: taking medications as instructed, no medication side effects noted, no TIA's, no chest pain on exertion, no dyspnea on exertion, no swelling of ankles, no palpitations. Anxiety  Patient complains of anxiety and depressive disorder.  She denies current suicidal and homicidal ideation. She complains of the following side effects from the treatment: none. Was previously followed by Psych - DrHugo Stevens (retired) [employer], last seen in June before he retired. PCP has been providing medication refills. Of note, Ms. Mejias expresses interest in breast reduction. Review of Systems   All other systems reviewed and are negative. Wt Readings from Last 3 Encounters:   11/24/21 160 lb (72.6 kg)   07/20/21 159 lb (72.1 kg)   12/29/20 157 lb (71.2 kg)     Temp Readings from Last 3 Encounters:   11/24/21 97.3 °F (36.3 °C) (Temporal)   07/20/21 98.3 °F (36.8 °C) (Temporal)   12/08/20 98 °F (36.7 °C) (Temporal)     BP Readings from Last 3 Encounters:   11/24/21 112/70   07/20/21 118/72   12/29/20 128/80     Pulse Readings from Last 3 Encounters:   11/24/21 (!) 50   07/20/21 (!) 58   12/08/20 62           PHYSICAL EXAMINATION:       General: Alert, cooperative, no distress  Respiratory: Breathing comfortably, in no acute respiratory distress. Clear to auscultation bilaterally. Normal inspiratory and expiratory ratio. Cardiovascular: Regular rate and rhythm, S1, S2 normal, no murmur, click, rub or gallop. Extremities: no edema. Pulses 2+ and symmetric radial and dorsalis pedis   Abdomen: Soft, non-tender, not distended. Bowel sounds normal. No masses or organomegaly. MSK: Extremities normal appearing, atraumatic, no effusion. Gait steady and unassisted. Skin: Skin color, texture, turgor normal. No rashes or lesions on exposed skin. Neurologic: Cranial nerves II-XII intact. Strength 5/5 grossly. Sensation and reflexes normal throughout. Psychiatric: Normal affect. Mood euthymic. Thoughts logical. Speech volume and speed normal            Treatment risks/benefits/costs/interactions/alternatives discussed with patient.   Advised patient to call back or return to office if symptoms worsen/change/persist. If patient cannot reach us or should anything more severe/urgent arise he/she should proceed directly to the nearest emergency department. Discussed expected course/resolution/complications of diagnosis in detail with patient. Patient expressed understanding with the diagnosis and plan. An electronic signature was used to authenticate this note.   -- Leia Tavares NP

## 2022-03-20 PROBLEM — F41.8 MIXED ANXIETY DEPRESSIVE DISORDER: Status: ACTIVE | Noted: 2017-05-30

## 2022-06-06 ENCOUNTER — TELEPHONE (OUTPATIENT)
Dept: FAMILY MEDICINE CLINIC | Age: 57
End: 2022-06-06

## 2022-06-06 NOTE — TELEPHONE ENCOUNTER
Called patient. Two patient identifiers verified. Pt said that she has not established care with psych because she \"has not needed any psychiatric care\".     ----- Message from Sabina Knutson NP sent at 6/6/2022  7:30 AM EDT -----  Regarding: call patient  I saw this patient in November and made the recommendation to establish with psych to prescribe meds? Please contact patient to find out if this happened as I received refill requests for her.     -KB

## 2022-08-10 ENCOUNTER — OFFICE VISIT (OUTPATIENT)
Dept: FAMILY MEDICINE CLINIC | Age: 57
End: 2022-08-10
Payer: COMMERCIAL

## 2022-08-10 VITALS
TEMPERATURE: 97.5 F | HEIGHT: 61 IN | RESPIRATION RATE: 16 BRPM | OXYGEN SATURATION: 99 % | WEIGHT: 160 LBS | BODY MASS INDEX: 30.21 KG/M2 | HEART RATE: 53 BPM | SYSTOLIC BLOOD PRESSURE: 118 MMHG | DIASTOLIC BLOOD PRESSURE: 72 MMHG

## 2022-08-10 DIAGNOSIS — M54.50 LUMBAR BACK PAIN: Primary | ICD-10-CM

## 2022-08-10 PROCEDURE — 99213 OFFICE O/P EST LOW 20 MIN: CPT | Performed by: NURSE PRACTITIONER

## 2022-08-10 RX ORDER — CYCLOBENZAPRINE HCL 5 MG
5 TABLET ORAL
Qty: 10 TABLET | Refills: 0 | Status: SHIPPED | OUTPATIENT
Start: 2022-08-10 | End: 2022-10-04

## 2022-08-10 NOTE — PROGRESS NOTES
Assessment/Plan:     Diagnoses and all orders for this visit:    1. Lumbar back pain  -     cyclobenzaprine (FLEXERIL) 5 mg tablet; Take 1 Tablet by mouth two (2) times daily as needed for Muscle Spasm(s). Treatment as above. Symptomatic care. Follow up if no improvement. Follow-up and Dispositions    Return if symptoms worsen or fail to improve. Discussed expected course/resolution/complications of diagnosis in detail with patient. Medication risks/benefits/costs/interactions/alternatives discussed with patient. Pt was given after visit summary which includes diagnoses, current medications & vitals. Pt expressed understanding with the diagnosis and plan          Subjective:      Fredy Romo is a 61 y.o. female who presents for had concerns including Back Pain (Sharp shooting pain up back occurring for 4 days with tightness ). Reports a fall during a soccer game 4 days ago. Started with left lumbar back pain 24 hours later. Symptoms are worsening over time. Symptoms are aching, occasionally shooting and stabbing. Attempted tylenol and topical otc agents with some relief. Patient Active Problem List   Diagnosis Code    Hearing aid worn Z97.4    Essential hypertension I10    Mixed anxiety depressive disorder F41.8       Current Outpatient Medications   Medication Sig Dispense Refill    cyclobenzaprine (FLEXERIL) 5 mg tablet Take 1 Tablet by mouth two (2) times daily as needed for Muscle Spasm(s). 10 Tablet 0    ARIPiprazole (ABILIFY) 2 mg tablet TAKE 1/2 TABLET BY MOUTH DAILY 60 Tablet 1    hydroCHLOROthiazide (HYDRODIURIL) 25 mg tablet TAKE ONE TABLET BY MOUTH DAILY FOR BLOOD PRESSURE 90 Tablet 1    PARoxetine (PAXIL) 30 mg tablet TAKE TWO TABLETS BY MOUTH EVERY NIGHT AT BEDTIME 120 Tablet 2    buPROPion XL (WELLBUTRIN XL) 300 mg XL tablet TAKE ONE TABLET BY MOUTH EVERY MORNING 90 Tablet 1    ALPRAZolam (Xanax) 0.5 mg tablet Take 1 Tablet by mouth daily as needed for Anxiety. 20 Tablet 0    ascorbic acid (VITAMIN C PO) Take 1 Tab by mouth daily. multivitamin (ONE A DAY) tablet Take 1 tablet by mouth daily. No Known Allergies    ROS:   Review of Systems   Constitutional:  Negative for malaise/fatigue. Eyes:  Negative for blurred vision. Respiratory:  Negative for shortness of breath. Cardiovascular:  Negative for chest pain. Musculoskeletal:  Positive for back pain. Objective:   Visit Vitals  /72 (BP 1 Location: Left arm, BP Patient Position: Sitting, BP Cuff Size: Adult)   Pulse (!) 53   Temp 97.5 °F (36.4 °C) (Temporal)   Resp 16   Ht 5' 1\" (1.549 m)   Wt 160 lb (72.6 kg)   LMP 04/01/2021   SpO2 99%   BMI 30.23 kg/m²       Vitals and Nurse Documentation reviewed. Physical Exam  Constitutional:       General: She is not in acute distress. Appearance: She is obese. Cardiovascular:      Heart sounds: S1 normal and S2 normal. No murmur heard. No friction rub. No gallop. Pulmonary:      Effort: No respiratory distress. Breath sounds: Normal breath sounds. Musculoskeletal:      Lumbar back: Spasms present. No tenderness or bony tenderness. Decreased range of motion. Negative right straight leg raise test and negative left straight leg raise test.   Skin:     General: Skin is warm and dry.    Psychiatric:         Mood and Affect: Mood and affect normal.

## 2022-10-04 ENCOUNTER — OFFICE VISIT (OUTPATIENT)
Dept: FAMILY MEDICINE CLINIC | Age: 57
End: 2022-10-04
Payer: COMMERCIAL

## 2022-10-04 VITALS
OXYGEN SATURATION: 97 % | TEMPERATURE: 97.5 F | HEIGHT: 61 IN | SYSTOLIC BLOOD PRESSURE: 134 MMHG | DIASTOLIC BLOOD PRESSURE: 82 MMHG | BODY MASS INDEX: 29.83 KG/M2 | WEIGHT: 158 LBS | HEART RATE: 70 BPM | RESPIRATION RATE: 17 BRPM

## 2022-10-04 DIAGNOSIS — I10 ESSENTIAL HYPERTENSION: Primary | ICD-10-CM

## 2022-10-04 DIAGNOSIS — Z23 ENCOUNTER FOR IMMUNIZATION: ICD-10-CM

## 2022-10-04 DIAGNOSIS — F41.8 MIXED ANXIETY DEPRESSIVE DISORDER: ICD-10-CM

## 2022-10-04 DIAGNOSIS — Z80.8 FAMILY HISTORY OF THYROID CANCER: ICD-10-CM

## 2022-10-04 DIAGNOSIS — Z00.00 ROUTINE GENERAL MEDICAL EXAMINATION AT A HEALTH CARE FACILITY: ICD-10-CM

## 2022-10-04 PROCEDURE — 90686 IIV4 VACC NO PRSV 0.5 ML IM: CPT | Performed by: STUDENT IN AN ORGANIZED HEALTH CARE EDUCATION/TRAINING PROGRAM

## 2022-10-04 PROCEDURE — 99214 OFFICE O/P EST MOD 30 MIN: CPT | Performed by: STUDENT IN AN ORGANIZED HEALTH CARE EDUCATION/TRAINING PROGRAM

## 2022-10-04 PROCEDURE — 90471 IMMUNIZATION ADMIN: CPT | Performed by: STUDENT IN AN ORGANIZED HEALTH CARE EDUCATION/TRAINING PROGRAM

## 2022-10-04 PROCEDURE — 99396 PREV VISIT EST AGE 40-64: CPT | Performed by: STUDENT IN AN ORGANIZED HEALTH CARE EDUCATION/TRAINING PROGRAM

## 2022-10-04 RX ORDER — HYDROXYZINE 25 MG/1
25 TABLET, FILM COATED ORAL
Qty: 90 TABLET | Refills: 3 | Status: SHIPPED | OUTPATIENT
Start: 2022-10-04 | End: 2022-11-03

## 2022-10-04 NOTE — PATIENT INSTRUCTIONS
[facility]- Psychiatry services  [facility]    Use [employer]     [facility] Psychiatry:  [facility]  [phone]

## 2022-10-04 NOTE — PROGRESS NOTES
[facility] PRACTICE      Chief Complaint:     Chief Complaint   Patient presents with    Depression     Would like to know if medication for depression need to be change or need a psychiatrics. Immunization/Injection     Influenza Vaccine         Jackie Ramsay is a 61 y.o. female that presents for: depression      Assessment/Plan:     Diagnoses and all orders for this visit:    1. Essential hypertension  -     METABOLIC PANEL, COMPREHENSIVE; Future  -     CBC WITH AUTOMATED DIFF; Future  -     LIPID PANEL; Future    2. Mixed anxiety depressive disorder  -     CBC WITH AUTOMATED DIFF; Future  -     REFERRAL TO PSYCHIATRY- given info for [facility] for counseling and Psychiatric services  -     hydrOXYzine HCL (ATARAX) 25 mg tablet; Take 1 Tablet by mouth three (3) times daily as needed for Anxiety for up to 30 days. -     VITAMIN D, 25 HYDROXY; Future    3. Routine general medical examination at a health care facility  -     METABOLIC PANEL, COMPREHENSIVE; Future  -     CBC WITH AUTOMATED DIFF; Future  -     LIPID PANEL; Future    4. Family history of thyroid cancer  -     TSH 3RD GENERATION; Future    5. Encounter for immunization  -     INFLUENZA, FLUARIX, FLULAVAL, FLUZONE (AGE 6 MO+), AFLURIA(AGE 3Y+) IM, PF, 0.5 ML  -     SC IMMUNIZ ADMIN,1 SINGLE/COMB VAC/TOXOID         Follow up: Follow-up and Dispositions    Return in about 3 months (around 1/4/2023). Subjective:   HPI:  Jackie Ramsay is a 61 y.o. female that presents for:    Depression and anxiety:   Patient complains of anxiety and depressive disorder. She denies current suicidal and homicidal ideation. She complains of the following side effects from the treatment: none. Was previously followed by Psych - Dr. Addison Beck (retired) ; last saw over a year ago. cara rodriguez, last seen in June before he retired. PCP has been providing medication refills. Taken Lexapro.  Taking 1/2 a xanax one a day for the past 2 weeks, before this took it very seldom. Mother had a fall recently, she is her only family and support in the area. Abilify was supposed to be for short term. HTN: on HCTZ 25mg  Denies HA, change in vision, chest pain, sob, or leg swelling. Health Maintenance:  Health Maintenance Due   Topic Date Due    Cervical cancer screen  Never done    Shingrix Vaccine Age 49> (1 of 2) Never done    Flu Vaccine (1) 08/01/2022      Pap: at [id number] 8 months ago , requesting records    ROS:   See HPI      Past medical history, social history, and medications personally reviewed. Past Medical History:   Diagnosis Date    Anxiety     Depression     Hearing aid worn     Bilateral    Herpes 1990    Hypertension         Allergies personally reviewed. No Known Allergies       Objective:   Vitals reviewed. Visit Vitals  /82 (BP 1 Location: Left upper arm, BP Patient Position: Sitting, BP Cuff Size: Adult)   Pulse 70   Temp 97.5 °F (36.4 °C) (Temporal)   Resp 17   Ht 5' 1\" (1.549 m)   Wt 158 lb (71.7 kg)   SpO2 97%   BMI 29.85 kg/m²        Wt Readings from Last 3 Encounters:   10/04/22 158 lb (71.7 kg)   08/10/22 160 lb (72.6 kg)   11/24/21 160 lb (72.6 kg)        Physical Exam  Physical Exam     Vitals: Reviewed. General: AO x 3. No distress. Not diaphoretic. No jaundice. No cyanosis. No pallor. HEENT: No thyromegaly or JVD  Cardio: Normal rate, regular rhythm. No murmur, rubs, or gallop. Pulmonary: Effort normal. No accessory muscle use. No wheezes, rales, or rhonchi. Extremities: No edema of lower extremities. Pulses 2+. Neurological: CN II-XII grossly intact. No focal deficits. Psych: Tearful. Thought content and patterns appropriate. Affect appropriate. Skin: No rash. I have reviewed pertinent labs results and other data. I have discussed the diagnosis with the patient and the intended plan as seen in the above orders.  The patient has received an after-visit summary and questions were answered concerning future plans. I have discussed medication side effects and warnings with the patient as well.     J Carlos Alejandro,   10/04/22

## 2022-10-04 NOTE — PROGRESS NOTES
Chief Complaint   Patient presents with    Depression     Would like to know if medication for depression need to be change or need a psychiatrics. 1. \"Have you been to the ER, urgent care clinic since your last visit? Hospitalized since your last visit? \" No    2. \"Have you seen or consulted any other health care providers outside of the [address] [place] [address] since your last visit? \" No     3. For patients aged 39-70: Has the patient had a colonoscopy / FIT/ Cologuard? Yes - no Care Gap present      If the patient is female:    4. For patients aged 41-77: Has the patient had a mammogram within the past 2 years? Yes - no Care Gap present      5. For patients aged 21-65: Has the patient had a pap smear? No         3 most recent PHQ Screens 10/4/2022   Little interest or pleasure in doing things Not at all   Feeling down, depressed, irritable, or hopeless Nearly every day   Total Score PHQ 2 3   Trouble falling or staying asleep, or sleeping too much Not at all   Feeling tired or having little energy Several days   Poor appetite, weight loss, or overeating Several days   Feeling bad about yourself - or that you are a failure or have let yourself or your family down Nearly every day   Trouble concentrating on things such as school, work, reading, or watching TV Several days   Moving or speaking so slowly that other people could have noticed; or the opposite being so fidgety that others notice More than half the days   Thoughts of being better off dead, or hurting yourself in some way Not at all   PHQ 9 Score 11   How difficult have these problems made it for you to do your work, take care of your home and get along with others Not difficult at all       Health Maintenance Due   Topic Date Due    Cervical cancer screen  Never done    Shingrix Vaccine Age 50> (1 of 2) Never done    Flu Vaccine (1) 08/01/2022      Wen Necessary is a 61 y.o. female  who presents for routine immunizations.  Influenza Vaccine  She denies any symptoms , reactions or allergies that would exclude them from being immunized today. Risks and adverse reactions were discussed and the VIS was given to them. All questions were addressed. She was observed for 10 min post injection. There were no reactions observed.

## 2022-10-04 NOTE — PROGRESS NOTES
Chief Complaint   Patient presents with    Depression     Would like to know if medication for depression need to be change or need a psychiatrics. Immunization/Injection     Influenza Vaccine       Nat Porter  is a 61 y.o.  female  who present for INFLUENZA  immunizations/injections. He/she denies any symptoms , reactions or allergies that would exclude them from being immunized today. Risks and adverse reactions were discussed and the VIS was given if applicable to them. All questions were addressed. He/She was observed for 10 min post injection. There were no reactions observed.     Shoaib Butler, MARCOSN

## 2022-10-05 LAB
25(OH)D3 SERPL-MCNC: 28.1 NG/ML (ref 30–100)
ALBUMIN SERPL-MCNC: 3.8 G/DL (ref 3.5–5)
ALBUMIN/GLOB SERPL: 1.1 {RATIO} (ref 1.1–2.2)
ALP SERPL-CCNC: 96 U/L (ref 45–117)
ALT SERPL-CCNC: 30 U/L (ref 12–78)
ANION GAP SERPL CALC-SCNC: 8 MMOL/L (ref 5–15)
AST SERPL-CCNC: 20 U/L (ref 15–37)
BASOPHILS # BLD: 0 K/UL (ref 0–0.1)
BASOPHILS NFR BLD: 0 % (ref 0–1)
BILIRUB SERPL-MCNC: 0.3 MG/DL (ref 0.2–1)
BUN SERPL-MCNC: 17 MG/DL (ref 6–20)
BUN/CREAT SERPL: 19 (ref 12–20)
CALCIUM SERPL-MCNC: 9.5 MG/DL (ref 8.5–10.1)
CHLORIDE SERPL-SCNC: 103 MMOL/L (ref 97–108)
CHOLEST SERPL-MCNC: 225 MG/DL
CO2 SERPL-SCNC: 31 MMOL/L (ref 21–32)
CREAT SERPL-MCNC: 0.89 MG/DL (ref 0.55–1.02)
DIFFERENTIAL METHOD BLD: NORMAL
EOSINOPHIL # BLD: 0 K/UL (ref 0–0.4)
EOSINOPHIL NFR BLD: 1 % (ref 0–7)
ERYTHROCYTE [DISTWIDTH] IN BLOOD BY AUTOMATED COUNT: 11.9 % (ref 11.5–14.5)
GLOBULIN SER CALC-MCNC: 3.6 G/DL (ref 2–4)
GLUCOSE SERPL-MCNC: 95 MG/DL (ref 65–100)
HCT VFR BLD AUTO: 41 % (ref 35–47)
HDLC SERPL-MCNC: 86 MG/DL
HDLC SERPL: 2.6 {RATIO} (ref 0–5)
HGB BLD-MCNC: 13.2 G/DL (ref 11.5–16)
IMM GRANULOCYTES # BLD AUTO: 0 K/UL (ref 0–0.04)
IMM GRANULOCYTES NFR BLD AUTO: 0 % (ref 0–0.5)
LDLC SERPL CALC-MCNC: 120.2 MG/DL (ref 0–100)
LYMPHOCYTES # BLD: 1.1 K/UL (ref 0.8–3.5)
LYMPHOCYTES NFR BLD: 22 % (ref 12–49)
MCH RBC QN AUTO: 27.4 PG (ref 26–34)
MCHC RBC AUTO-ENTMCNC: 32.2 G/DL (ref 30–36.5)
MCV RBC AUTO: 85.2 FL (ref 80–99)
MONOCYTES # BLD: 0.5 K/UL (ref 0–1)
MONOCYTES NFR BLD: 9 % (ref 5–13)
NEUTS SEG # BLD: 3.6 K/UL (ref 1.8–8)
NEUTS SEG NFR BLD: 68 % (ref 32–75)
NRBC # BLD: 0 K/UL (ref 0–0.01)
NRBC BLD-RTO: 0 PER 100 WBC
PLATELET # BLD AUTO: 269 K/UL (ref 150–400)
PMV BLD AUTO: 11.5 FL (ref 8.9–12.9)
POTASSIUM SERPL-SCNC: 4.2 MMOL/L (ref 3.5–5.1)
PROT SERPL-MCNC: 7.4 G/DL (ref 6.4–8.2)
RBC # BLD AUTO: 4.81 M/UL (ref 3.8–5.2)
SODIUM SERPL-SCNC: 142 MMOL/L (ref 136–145)
TRIGL SERPL-MCNC: 94 MG/DL (ref ?–150)
TSH SERPL DL<=0.05 MIU/L-ACNC: 1.59 UIU/ML (ref 0.36–3.74)
VLDLC SERPL CALC-MCNC: 18.8 MG/DL
WBC # BLD AUTO: 5.3 K/UL (ref 3.6–11)

## 2022-10-05 NOTE — PROGRESS NOTES
TSH normal  Vit D 28- should start 1,000 units daily  ASCVD risk 3.1 % low risk, no statin  CBC/BMP unremarkable    Can discuss at next visit with PCP in a month

## 2022-10-14 DIAGNOSIS — F41.8 MIXED ANXIETY DEPRESSIVE DISORDER: ICD-10-CM

## 2022-10-14 RX ORDER — PAROXETINE 30 MG/1
TABLET, FILM COATED ORAL
Qty: 60 TABLET | Refills: 0 | Status: SHIPPED | OUTPATIENT
Start: 2022-10-14

## 2022-11-09 ENCOUNTER — OFFICE VISIT (OUTPATIENT)
Dept: FAMILY MEDICINE CLINIC | Age: 57
End: 2022-11-09
Payer: COMMERCIAL

## 2022-11-09 VITALS
TEMPERATURE: 97.7 F | WEIGHT: 159.6 LBS | RESPIRATION RATE: 16 BRPM | OXYGEN SATURATION: 96 % | HEART RATE: 61 BPM | HEIGHT: 61 IN | BODY MASS INDEX: 30.13 KG/M2 | SYSTOLIC BLOOD PRESSURE: 122 MMHG | DIASTOLIC BLOOD PRESSURE: 80 MMHG

## 2022-11-09 DIAGNOSIS — E55.9 HYPOVITAMINOSIS D: ICD-10-CM

## 2022-11-09 DIAGNOSIS — Z00.00 WELLNESS EXAMINATION: Primary | ICD-10-CM

## 2022-11-09 DIAGNOSIS — F41.8 MIXED ANXIETY DEPRESSIVE DISORDER: ICD-10-CM

## 2022-11-09 DIAGNOSIS — I10 ESSENTIAL HYPERTENSION: ICD-10-CM

## 2022-11-09 PROCEDURE — 3078F DIAST BP <80 MM HG: CPT | Performed by: NURSE PRACTITIONER

## 2022-11-09 PROCEDURE — 3074F SYST BP LT 130 MM HG: CPT | Performed by: NURSE PRACTITIONER

## 2022-11-09 PROCEDURE — 99396 PREV VISIT EST AGE 40-64: CPT | Performed by: NURSE PRACTITIONER

## 2022-11-09 PROCEDURE — 99213 OFFICE O/P EST LOW 20 MIN: CPT | Performed by: NURSE PRACTITIONER

## 2022-11-09 RX ORDER — BUPROPION HYDROCHLORIDE 300 MG/1
300 TABLET ORAL DAILY
Qty: 90 TABLET | Refills: 3 | Status: SHIPPED | OUTPATIENT
Start: 2022-11-09 | End: 2022-11-25

## 2022-11-09 RX ORDER — ACETAMINOPHEN 500 MG
2000 TABLET ORAL DAILY
Qty: 90 CAPSULE | Refills: 1 | Status: SHIPPED | OUTPATIENT
Start: 2022-11-09

## 2022-11-09 RX ORDER — ARIPIPRAZOLE 2 MG/1
TABLET ORAL
Qty: 60 TABLET | Refills: 1 | Status: SHIPPED | OUTPATIENT
Start: 2022-11-09

## 2022-11-09 RX ORDER — HYDROCHLOROTHIAZIDE 25 MG/1
25 TABLET ORAL DAILY
Qty: 90 TABLET | Refills: 3 | Status: SHIPPED | OUTPATIENT
Start: 2022-11-09

## 2022-11-09 RX ORDER — ZOSTER VACCINE RECOMBINANT, ADJUVANTED 50 MCG/0.5
0.5 KIT INTRAMUSCULAR ONCE
Qty: 0.5 ML | Refills: 0 | Status: SHIPPED | OUTPATIENT
Start: 2022-11-09 | End: 2022-11-09

## 2022-11-09 RX ORDER — PAROXETINE 30 MG/1
30 TABLET, FILM COATED ORAL DAILY
Qty: 90 TABLET | Refills: 3 | Status: SHIPPED | OUTPATIENT
Start: 2022-11-09 | End: 2022-11-15 | Stop reason: SDUPTHER

## 2022-11-09 NOTE — PROGRESS NOTES
[facility] ER Note     Idalia Wesley (: 1965) is a 61 y.o. female, established patient, here for evaluation of the following chief complaint(s):  Physical       ASSESSMENT/PLAN:  1. Wellness examination  - Normal exam  - Request PAP records [facility]      - varicella-zoster recombinant, PF, (Shingrix, PF,) 50 mcg/0.5 mL susr injection; 0.5 mL by IntraMUSCular route once for 1 dose., Nicki, Disp-0.5 mL, R-0Fax confirmation of vaccine given to [phone]    2. Mixed anxiety depressive disorder  -     buPROPion XL (WELLBUTRIN XL) 300 mg XL tablet; Take 1 Tablet by mouth daily for 90 days. , Nicki, Disp-90 Tablet, R-3  -     PARoxetine (PAXIL) 30 mg tablet; Take 1 Tablet by mouth daily. , Print, Disp-90 Tablet, R-3  -     ARIPiprazole (ABILIFY) 2 mg tablet; 0.5 tab po every other day or as directed by MD, Nicki Disp-60 Tablet, R-1  - Discussed weaning of Abilify. She has been on this very low dose for about a year and wishes to wean. Would like to keep the weaning simple. Counseling provided on contacting provider should she experience breakthrough symptoms. Abilify weaning schedule -  Half tab every other day x2 weeks  Half tab every second day x2 weeks  Half tab every third day x2 weeks then discontinue. Please contact provider as discussed should we need to slow the weaning process based on breakthrough symptoms. 3. Essential hypertension  -     hydroCHLOROthiazide (HYDRODIURIL) 25 mg tablet; Take 1 Tablet by mouth daily. , Print, Disp-90 Tablet, R-3  - 10/2022 labs reviewed    4. Hypovitaminosis D  -     cholecalciferol (VITAMIN D3) (2,000 UNITS /50 MCG) cap capsule; Take 1 Capsule by mouth daily. , Print, Disp-90 Capsule, R-1      Return in about 1 year (around 2023), or if symptoms worsen or fail to improve.           SUBJECTIVE/OBJECTIVE:    Idalia Wesley is a 61 y.o. female seen today for wellness exam.     Cardiovascular Review:  She has hypertension and hyperlipidemia. Diet and Lifestyle: generally follows a low fat low cholesterol diet  Home BP Monitoring: checked intermittently. Pertinent ROS: taking medications as instructed, no medication side effects noted, no TIA's, no chest pain on exertion, no dyspnea on exertion, no swelling of ankles. Depression and anxiety:   Patient complains of anxiety and depressive disorder. She denies current suicidal and homicidal ideation. She complains of the following side effects from the treatment: none. Was previously followed by Psych - Dr. Dre Wilson (retired) ; last saw over a year ago. cara rodrgiuez, last seen in June before he retired. PCP has been providing medication refills. Taken Lexapro. Taking 1/2 a xanax one a day for the past 2 weeks, before this took it very seldom. Mother had a fall recently, she is her only family and support in the area. Abilify was supposed to be for short term. Ms. Nydia Magaña expresses interest in weaning Abilify. Occupation:   Exercise: classes at the [employer] 2-3 x/wk  Tobacco: No  ETOH: Rare  See's G[facility]: [address] [facility] [facility]  Cervical Cancer Screening: up to date, requeste. Colon Cancer Screening: up to date. Breast Cancer Screening: up to date  DEXA: N/A due age   Hep C: up to date         REVIEW OF SYSTEMS:    Review of Systems   All other systems reviewed and are negative.       VITAL SIGNS:    Wt Readings from Last 3 Encounters:   11/09/22 159 lb 9.6 oz (72.4 kg)   10/04/22 158 lb (71.7 kg)   08/10/22 160 lb (72.6 kg)     Temp Readings from Last 3 Encounters:   11/09/22 97.7 °F (36.5 °C) (Temporal)   10/04/22 97.5 °F (36.4 °C) (Temporal)   08/10/22 97.5 °F (36.4 °C) (Temporal)     BP Readings from Last 3 Encounters:   11/09/22 122/80   10/04/22 134/82   08/10/22 118/72     Pulse Readings from Last 3 Encounters:   11/09/22 61   10/04/22 70   08/10/22 (!) 53           PHYSICAL EXAMINATION:       General: Alert, cooperative, no distress  Eyes: Conjunctivae clear. Pupils equally round and reactive to light, Extraocular muscles intact. Ears: Normal external ear canals both ears. Nose: Nares normal. Septum midline. Mucosa normal. No drainage or sinus tenderness. Mouth/Throat: Lips, mucosa, and tongue normal. No oropharyngeal erythema. No tonsillar enlargement or exudate. Neck: Supple, symmetrical, trachea midline, no adenopathy. No thyroid enlargement/tenderness/nodules  Respiratory: Breathing comfortably, in no acute respiratory distress. Clear to auscultation bilaterally. Normal inspiratory and expiratory ratio. Cardiovascular: Regular rate and rhythm, S1, S2 normal, no murmur, click, rub or gallop. Extremities: no edema. Pulses 2+ and symmetric radial and dorsalis pedis   Abdomen: Soft, non-tender, not distended. Bowel sounds normal. No masses or organomegaly. MSK: Extremities normal appearing, atraumatic, no effusion. Gait steady and unassisted. Skin: Skin color, texture, turgor normal. No rashes or lesions on exposed skin. Lymph nodes: Cervical, supraclavicular nodes normal.  Neurologic: Cranial nerves II-XII intact. Strength 5/5 grossly. Sensation and reflexes normal throughout. Psychiatric: Normal affect. Mood euthymic. Thoughts logical. Speech volume and speed normal            Treatment risks/benefits/costs/interactions/alternatives discussed with patient. Advised patient to call back or return to office if symptoms worsen/change/persist. If patient cannot reach us or should anything more severe/urgent arise he/she should proceed directly to the nearest emergency department. Discussed expected course/resolution/complications of diagnosis in detail with patient. Patient expressed understanding with the diagnosis and plan. An electronic signature was used to authenticate this note.   -- Sunni Reynolds NP

## 2022-11-09 NOTE — PATIENT INSTRUCTIONS
Abilify weaning schedule -   Half tab every other day x2 weeks  Half tab every second day x2 weeks  Half tab every third day x2 weeks then discontinue. Please contact provider as discussed should we need to slow the weaning process based on breakthrough symptoms.

## 2022-11-09 NOTE — PROGRESS NOTES
Chief Complaint   Patient presents with    Physical       1. \"Have you been to the ER, urgent care clinic since your last visit? Hospitalized since your last visit? \" No    2. \"Have you seen or consulted any other health care providers outside of the [address] [place] [address] since your last visit? \" No     3. For patients over 45: Has the patient had a colonoscopy? Yes - no Care Gap present     If the patient is female:    4. For patients over 40: Has the patient had a mammogram? Yes - no Care Gap present    5. For patients over 21: Has the patient had a pap smear? Yes - Care Gap present. Rooming MA/LPN to request most recent results vapfw     3 most recent PHQ Screens 11/9/2022   Little interest or pleasure in doing things Not at all   Feeling down, depressed, irritable, or hopeless Not at all   Total Score PHQ 2 0   Trouble falling or staying asleep, or sleeping too much -   Feeling tired or having little energy -   Poor appetite, weight loss, or overeating -   Feeling bad about yourself - or that you are a failure or have let yourself or your family down -   Trouble concentrating on things such as school, work, reading, or watching TV -   Moving or speaking so slowly that other people could have noticed; or the opposite being so fidgety that others notice -   Thoughts of being better off dead, or hurting yourself in some way -   PHQ 9 Score -   How difficult have these problems made it for you to do your work, take care of your home and get along with others -         Abuse Screening Questionnaire 11/9/2022   Do you ever feel afraid of your partner? N   Are you in a relationship with someone who physically or mentally threatens you? N   Is it safe for you to go home?  Y          Health Maintenance Due   Topic Date Due    Cervical cancer screen  Never done    Shingrix Vaccine Age 49> (1 of 2) Never done    COVID-19 Vaccine (5 - Booster for Pfizer series) 06/25/2022

## 2022-11-14 ENCOUNTER — PATIENT MESSAGE (OUTPATIENT)
Dept: FAMILY MEDICINE CLINIC | Age: 57
End: 2022-11-14

## 2022-11-14 DIAGNOSIS — F41.8 MIXED ANXIETY DEPRESSIVE DISORDER: ICD-10-CM

## 2022-11-15 RX ORDER — PAROXETINE 30 MG/1
60 TABLET, FILM COATED ORAL DAILY
Qty: 180 TABLET | Refills: 3 | Status: SHIPPED | OUTPATIENT
Start: 2022-11-15

## 2022-11-30 ENCOUNTER — OFFICE VISIT (OUTPATIENT)
Dept: SOCIAL WORK | Age: 57
End: 2022-11-30
Payer: COMMERCIAL

## 2022-11-30 ENCOUNTER — TELEPHONE (OUTPATIENT)
Dept: FAMILY MEDICINE CLINIC | Age: 57
End: 2022-11-30

## 2022-11-30 DIAGNOSIS — F33.1 MAJOR DEPRESSIVE DISORDER, RECURRENT EPISODE, MODERATE (HCC): Primary | ICD-10-CM

## 2022-11-30 NOTE — TELEPHONE ENCOUNTER
Pt saw James Right and then requested to speak to NP [facility] nurse. Pt said that two refills ago on her bupropion refill they switched manufacturers and feels like this has had a negative effect on her.

## 2022-11-30 NOTE — PROGRESS NOTES
In Office-Initial Evaluation    Time Start:1:00 pm  Time End:2:07 pm    DX: Major Depression, Moderate, Recurrent        Met with Ms. Mejias 2022 for our first appointment. She reports she has recently had a change in her medication (from one generic to another) and is unsure if that has made her mood change but wanted to share this info. She also will let her NP, Janice Pierson aware that although each one is generic, one has made her mood more sullen, no motivation, no energy, more isolative and no appetite. Ms Jean Rashid also reports she has been feeling overwhelmed and had a hard time concentrating on her work. Ms. Jean Rashid lives with her boyfriend of 9 years and is self-employed as a CPA. She has never been  and has no children. She admits that as her mother gets older, (she is 80), she gets more and more worried about her passing. Apparently, Ms. Mejias does not have any close friends and she and her mother are very close. She becomes tearful re: this issue. Her older sister passed away from cancer and her older brother lives in [facility]. She reports she works and spends time with her boyfriend. Her father  6 years ago from a heart attack. Ms. Jean Rashid reluctantly discusses her relationship with her boyfriend. He is only 37years old and as she reports, he has a \"strong sex drive and I have none. \"  As she describes their relationship, it seems they do not do a lot of things together. She works all day and then in the evenings, she watches t.v in one room and he in another. He has not worked for the past year and will spend a lot of time [employer]. \"  She would like to talk about their relationship in more detail next session. No acute symptoms were reported today. Cynthia Gage is a 61 y.o. female who is alert and oriented X3.  she does not  have any suicidal or homicidal ideations.   Patient is not psychotic or delusional.   she has not been psychiatrically admitted to a hospital. Ms. Jean Rashid does have good eye contact during this interview. She is  fairly quiet and somewhat engaging. Patient does have good insight and judgement. she is a good candidate for short term therapy to address the above issues. We did set a follow-up appointment with this clinician.       Follow-up appt date (if applicable) is:  Dec. 15 @ Sameera Isaacs, [facility]

## 2022-12-15 ENCOUNTER — VIRTUAL VISIT (OUTPATIENT)
Dept: SOCIAL WORK | Age: 57
End: 2022-12-15
Payer: COMMERCIAL

## 2022-12-15 DIAGNOSIS — F33.1 MAJOR DEPRESSIVE DISORDER, RECURRENT EPISODE, MODERATE (HCC): Primary | ICD-10-CM
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
## 2023-01-05 ENCOUNTER — VIRTUAL VISIT (OUTPATIENT)
Dept: FAMILY MEDICINE CLINIC | Age: 58
End: 2023-01-05
Payer: COMMERCIAL

## 2023-01-05 DIAGNOSIS — U07.1 COVID-19: Primary | ICD-10-CM

## 2023-01-05 PROCEDURE — 99213 OFFICE O/P EST LOW 20 MIN: CPT | Performed by: STUDENT IN AN ORGANIZED HEALTH CARE EDUCATION/TRAINING PROGRAM

## 2023-01-05 NOTE — PROGRESS NOTES
Roseline Gaspar  61 y.o. female  1965  [place]  [id number]  [id number]   [facility]  Telemedicine Progress Note  [facility]       Encounter Date and Time: January 5, 2023 at 6:00 PM    Consent:  She and/or the health care decision maker is aware that that she may receive a bill for this telephone service, depending on her insurance coverage, and has provided verbal consent to proceed: Yes    Chief Complaint   Patient presents with    Positive For Covid-19     History of Present Illness   Roseline Gaspar is a 61 y.o. female hx of anxiety, depression, HTN was evaluated by synchronous (real-time) audio-video technology from home, through the [employer] Patient Portal.    Tested positive for COVID 1 days ago  Day 2 of symptoms  Has received COVID vaccine  Experiencing cough, sore throat    Denies cp, sob, wheezing, leg swelling or pain    Review of Systems   See HPI    Vitals/Objective:     General: alert, cooperative, no distress   Mental  status: mental status: alert, oriented to person, place, and time, normal mood, behavior, speech, dress, motor activity, and thought processes   Resp: resp: normal effort and no respiratory distress   Neuro: neuro: no gross deficits   Skin: skin: no discoloration or lesions of concern on visible areas   Due to this being a TeleHealth evaluation, many elements of the physical examination are unable to be assessed. Assessment and Plan:   Time-based coding, delete if not needed: I spent at least 15 minutes with this established patient, and >50% of the time was spent counseling and/or coordinating care regarding      1. COVID-19  Advised patient that most common SE with Paxlovid include taste alteration and GI upset. Advised if patient is taking a statin to stop it for duration of Paxlovid treatment. Informed of rare complication of rebound COVID, and if this occurs to begin isolation period over again.     - nirmatrelvir-ritonavir (Paxlovid, EUA,) 300 mg (150 mg x 2)-100 mg; 1 package  Dispense: 1 Box; Refill: 0  -Advised to stop xanax while taking paxlovid         We discussed the expected course, resolution and complications of the diagnosis(es) in detail. Medication risks, benefits, costs, interactions, and alternatives were discussed as indicated. I advised her to contact the office if her condition worsens, changes or fails to improve as anticipated. She expressed understanding with the diagnosis(es) and plan. Patient understands that this encounter was a temporary measure, and the importance of further follow up and examination was emphasized. Patient verbalized understanding. Patient informed to follow up: prn    Electronically Signed: Carla Connell DO    Providers location when delivering service: clinic     CPT Codes 25276-85397 for Established Patients may apply to this Telehealth Visit. POS code: 18. Modifier GT      Pursuant to the emergency declaration under the [address] [place] [address], [facility] waiver authority and the [employer] Act, this Virtual  Visit was conducted, with patient's consent, to reduce the patient's risk of exposure to COVID-19 and provide continuity of care for an established patient. Services were provided through a video synchronous discussion virtually to substitute for in-person clinic visit. History   Patients past medical, surgical and family histories were reviewed and updated.       Past Medical History:   Diagnosis Date    Anxiety     Depression     Hearing aid worn     Bilateral    Herpes 1990    Hypertension      Past Surgical History:   Procedure Laterality Date    HX GI  08/2017    COLONOCSCOPY    HX ORTHOPAEDIC Right 1/14    R index finger     Family History   Problem Relation Age of Onset    High Cholesterol Mother     Hypertension Mother     Cancer Mother         Skin    Osteoporosis Mother     Broken Bones Mother     Diabetes Father     Hypertension Father     High Cholesterol Father     Diabetes Sister     Hypertension Sister     Cancer Sister         Thyroid AND LUNG    Depression Brother      Social History     Socioeconomic History    Marital status: SINGLE     Spouse name: Not on file    Number of children: Not on file    Years of education: Not on file    Highest education level: Not on file   Occupational History    Not on file   Tobacco Use    Smoking status: Never    Smokeless tobacco: Never   Vaping Use    Vaping Use: Never used   Substance and Sexual Activity    Alcohol use: Yes     Comment: OCC.     Drug use: No    Sexual activity: Not Currently   Other Topics Concern     Service No    Blood Transfusions No    Caffeine Concern No    Occupational Exposure No    Hobby Hazards No    Sleep Concern Yes    Stress Concern Yes    Weight Concern Yes    Special Diet No    Back Care Yes    Exercise Yes    Bike Helmet Not Asked    Seat Belt Yes    Self-Exams Yes   Social History Narrative    Not on file     Social Determinants of Health     Financial Resource Strain: Low Risk     Difficulty of Paying Living Expenses: Not hard at all   Food Insecurity: No Food Insecurity    Worried About Running Out of Food in the Last Year: Never true    Ran Out of Food in the Last Year: Never true   Transportation Needs: Not on file   Physical Activity: Not on file   Stress: Not on file   Social Connections: Not on file   Intimate Partner Violence: Not on file   Housing Stability: Not on file     Patient Active Problem List   Diagnosis Code    Hearing aid worn Z97.4    Essential hypertension I10    Mixed anxiety depressive disorder F41.8          Current Medications/Allergies   Medications and Allergies reviewed:    Current Outpatient Medications   Medication Sig Dispense Refill    nirmatrelvir-ritonavir (Paxlovid, EUA,) 300 mg (150 mg x 2)-100 mg 1 package 1 Box 0    hydroCHLOROthiazide (HYDRODIURIL) 25 mg tablet TAKE ONE TABLET BY MOUTH DAILY FOR BLOOD PRESSURE 90 Tablet 2    buPROPion XL (WELLBUTRIN XL) 300 mg XL tablet TAKE ONE TABLET BY MOUTH EVERY MORNING 90 Tablet 1    PARoxetine (PAXIL) 30 mg tablet Take 2 Tablets by mouth daily. 180 Tablet 3    cholecalciferol (VITAMIN D3) (2,000 UNITS /50 MCG) cap capsule Take 1 Capsule by mouth daily. 90 Capsule 1    ARIPiprazole (ABILIFY) 2 mg tablet 0.5 tab po every other day or as directed by MD 60 Tablet 1    ALPRAZolam (Xanax) 0.5 mg tablet Take 1 Tablet by mouth daily as needed for Anxiety. 20 Tablet 0    ascorbic acid (VITAMIN C PO) Take 1 Tab by mouth daily. multivitamin (ONE A DAY) tablet Take 1 tablet by mouth daily.        No Known Allergies

## 2023-01-16 ENCOUNTER — OFFICE VISIT (OUTPATIENT)
Dept: FAMILY MEDICINE CLINIC | Age: 58
End: 2023-01-16
Payer: COMMERCIAL

## 2023-01-16 VITALS
BODY MASS INDEX: 28.43 KG/M2 | DIASTOLIC BLOOD PRESSURE: 86 MMHG | WEIGHT: 150.6 LBS | SYSTOLIC BLOOD PRESSURE: 131 MMHG | OXYGEN SATURATION: 99 % | RESPIRATION RATE: 16 BRPM | TEMPERATURE: 97.6 F | HEIGHT: 61 IN | HEART RATE: 66 BPM

## 2023-01-16 DIAGNOSIS — F41.8 MIXED ANXIETY DEPRESSIVE DISORDER: Primary | ICD-10-CM

## 2023-01-16 PROCEDURE — 3078F DIAST BP <80 MM HG: CPT | Performed by: NURSE PRACTITIONER

## 2023-01-16 PROCEDURE — 3074F SYST BP LT 130 MM HG: CPT | Performed by: NURSE PRACTITIONER

## 2023-01-16 PROCEDURE — 99213 OFFICE O/P EST LOW 20 MIN: CPT | Performed by: NURSE PRACTITIONER

## 2023-01-16 NOTE — Clinical Note
She has an appointment with you 1/19. We are holding off on medication changes until after your session then reevaluate her mood. Please let me know if you have any questions.

## 2023-01-16 NOTE — PROGRESS NOTES
Chief Complaint   Patient presents with    Anxiety         1. \"Have you been to the ER, urgent care clinic since your last visit? Hospitalized since your last visit? \" No    2. \"Have you seen or consulted any other health care providers outside of the [address] [place] [address] since your last visit? \" No     3. For patients over 45: Has the patient had a colonoscopy? Yes - no Care Gap present     If the patient is female:    4. For patients over 40: Has the patient had a mammogram? Yes - no Care Gap present    5. For patients over 21: Has the patient had a pap smear?  Yes - no Care Gap present     3 most recent PHQ Screens 1/16/2023   Little interest or pleasure in doing things Several days   Feeling down, depressed, irritable, or hopeless More than half the days   Total Score PHQ 2 3   Trouble falling or staying asleep, or sleeping too much More than half the days   Feeling tired or having little energy Several days   Poor appetite, weight loss, or overeating More than half the days   Feeling bad about yourself - or that you are a failure or have let yourself or your family down More than half the days   Trouble concentrating on things such as school, work, reading, or watching TV More than half the days   Moving or speaking so slowly that other people could have noticed; or the opposite being so fidgety that others notice More than half the days   Thoughts of being better off dead, or hurting yourself in some way Not at all   PHQ 9 Score 14   How difficult have these problems made it for you to do your work, take care of your home and get along with others Somewhat difficult       Health Maintenance Due   Topic Date Due    Shingles Vaccine (1 of 2) Never done    COVID-19 Vaccine (5 - Booster for SSN Funding series) 06/25/2022    Breast Cancer Screen Mammogram  02/02/2023

## 2023-01-16 NOTE — PROGRESS NOTES
[facility] Note     Angel Arroyo (: 1965) is a 61 y.o. female, established patient, here for evaluation of the following chief complaint(s): Anxiety       ASSESSMENT/PLAN:  1. Mixed anxiety depressive disorder  -Prolonged discussion held with patient in regards to management of her mood change. Likely situational.  She is looking forward to her therapy appointment later this week. We explored potential options and dose adjustment in her medications versus changing class or staying within SSRI family. At this time Ms. Mejias wishes to keep her medications at bupropion 300 mg and paroxetine 60 mg a day until after her counseling appointment. She agrees to contact provider should she wish to proceed with the above-noted changes. Explored nonmedicinal management to include sleep hygiene, eating healthy, and exercise. Return if symptoms worsen or fail to improve. On this date 2023 I have spent 25 minutes reviewing previous notes, test results and face to face with the patient discussing the diagnosis and importance of compliance with the treatment plan as well as documenting on the day of the visit. SUBJECTIVE/OBJECTIVE:    Angel Arroyo is a 61 y.o. female seen today for elevated anxiety. Reports mood changes since New Year's after recent treatment for Covid. Ms. Ofelia Sharpe returned to work last week. Describes feeling anxious, sad with decreased motivation. Rare use of xanax. She is looking forward to her therapy appointment with Danielle Malone later this week. Denies episodes of crying. Denies SI/HI. REVIEW OF SYSTEMS:    Review of Systems   Constitutional:  Positive for appetite change. Negative for diaphoresis, fatigue and fever. HENT: Negative. Respiratory: Negative. Cardiovascular: Negative. Gastrointestinal: Negative. Genitourinary: Negative. Musculoskeletal: Negative. Skin: Negative.     Neurological: Negative. Psychiatric/Behavioral:  Positive for dysphoric mood. Negative for self-injury and suicidal ideas. The patient is nervous/anxious. VITAL SIGNS:    Wt Readings from Last 3 Encounters:   01/16/23 150 lb 9.6 oz (68.3 kg)   11/09/22 159 lb 9.6 oz (72.4 kg)   10/04/22 158 lb (71.7 kg)     Temp Readings from Last 3 Encounters:   01/16/23 97.6 °F (36.4 °C) (Temporal)   11/09/22 97.7 °F (36.5 °C) (Temporal)   10/04/22 97.5 °F (36.4 °C) (Temporal)     BP Readings from Last 3 Encounters:   01/16/23 131/86   11/09/22 122/80   10/04/22 134/82     Pulse Readings from Last 3 Encounters:   01/16/23 66   11/09/22 61   10/04/22 70           PHYSICAL EXAMINATION:       General: Alert, cooperative, no distress  Respiratory: Breathing comfortably, in no acute respiratory distress. Clear to auscultation bilaterally. Cardiovascular: Regular rate and rhythm, S1, S2 normal, no murmur, click, rub or gallop. Extremities: no edema. Abdomen: Soft, non-tender, not distended. Bowel sounds normal. No masses or organomegaly. MSK: Extremities normal appearing, atraumatic, no effusion. Gait steady and unassisted. Skin: Skin color, texture, turgor normal. No rashes or lesions on exposed skin. Neurologic: A/Ox3  Psychiatric: Normal affect. Mood euthymic. Thoughts logical. Speech volume and speed normal            Treatment risks/benefits/costs/interactions/alternatives discussed with patient. Advised patient to call back or return to office if symptoms worsen/change/persist. If patient cannot reach us or should anything more severe/urgent arise he/she should proceed directly to the nearest emergency department. Discussed expected course/resolution/complications of diagnosis in detail with patient. Patient expressed understanding with the diagnosis and plan. An electronic signature was used to authenticate this note.   -- Chloe Wang, STEFAN

## 2023-01-19 ENCOUNTER — VIRTUAL VISIT (OUTPATIENT)
Dept: SOCIAL WORK | Age: 58
End: 2023-01-19
Payer: COMMERCIAL

## 2023-01-19 DIAGNOSIS — F33.1 MAJOR DEPRESSIVE DISORDER, RECURRENT EPISODE, MODERATE (HCC): Primary | ICD-10-CM

## 2023-01-19 PROCEDURE — 90834 PSYTX W PT 45 MINUTES: CPT | Performed by: SOCIAL WORKER
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
## 2023-02-13 ENCOUNTER — OFFICE VISIT (OUTPATIENT)
Dept: SOCIAL WORK | Age: 58
End: 2023-02-13
Payer: COMMERCIAL

## 2023-02-13 DIAGNOSIS — F33.1 MAJOR DEPRESSIVE DISORDER, RECURRENT EPISODE, MODERATE (HCC): Primary | ICD-10-CM

## 2023-02-13 PROCEDURE — 90834 PSYTX W PT 45 MINUTES: CPT | Performed by: SOCIAL WORKER

## 2023-02-13 NOTE — PROGRESS NOTES
In Office -Follow Up    Time Start:10:06 am  Time End:10:50 am    DX:     ICD-10-CM ICD-9-CM   1. Major depressive disorder, recurrent episode, moderate (HCC)  F33.1 296.32        Met with Ms. Meijas today for our follow up appt. This patient reports things are going \"pretty good. \"  She continues to work out several days per week. She is starting soccer in a week or so and is looking forward to getting back out on the field. She reports that her medication is definitely helping and will continue taking. As tax season is winding up, she is starting to get busy. We discussed her continued relationship issues and knows she needs to do something in that regard but is hesitant. Additionally she is trying to get out of her shell and meet more people since she continues to stress her relationship with her mother is almost her only social outlet. No acute symptoms reported. We did set a follow-up appointment with this clinician.       Follow-up appt date (if applicable) is:  March 16 @ 10:00 Dimitry Andrews LCSW

## 2023-02-26 DIAGNOSIS — F41.8 MIXED ANXIETY DEPRESSIVE DISORDER: ICD-10-CM

## 2023-02-27 RX ORDER — ARIPIPRAZOLE 2 MG/1
TABLET ORAL
Qty: 15 TABLET | Refills: 0 | Status: SHIPPED | OUTPATIENT
Start: 2023-02-27

## 2023-03-16 ENCOUNTER — VIRTUAL VISIT (OUTPATIENT)
Dept: SOCIAL WORK | Age: 58
End: 2023-03-16
Payer: COMMERCIAL

## 2023-03-16 DIAGNOSIS — F33.1 MAJOR DEPRESSIVE DISORDER, RECURRENT EPISODE, MODERATE (HCC): Primary | ICD-10-CM

## 2023-03-16 PROCEDURE — 90832 PSYTX W PT 30 MINUTES: CPT | Performed by: SOCIAL WORKER

## 2023-03-16 NOTE — PROGRESS NOTES
Virtual Visit (At Home) -Follow Up    Time Start:10:00 am  Time End:10:27 am    DX:     ICD-10-CM ICD-9-CM   1. Major depressive disorder, recurrent episode, moderate (HCC)  F33.1 296.32        Met with Ms. Mejias today for our follow up appt. This patient reports that although she is in the throes of tax season and VERY busy, she is feeling good and has been stable in her moods. She is playing soccer on the weekends, going to the gym 3 x's per week and she and her boyfriend have many events scheduled for the next several months. She feels like she and her boyfriend are doing more stuff together and he is more amenable to participating in things she enjoys. Her mother continues to do well and her health is stable. For that reason, this patient feels like she would just like to \"check in\" in about one month. At that time we will most likely discontinue services due to her improvement and significant progress. We did set a follow-up appointment with this clinician. Follow-up appt date (if applicable) is:  April 20 @ 10 VV    . Evan Belle, who was evaluated through a synchronous (real-time) audio-video encounter, and/or her healthcare decision maker, is aware that it is a billable service, which includes applicable co-pays, with coverage as determined by her insurance carrier. She provided verbal consent to proceed and patient identification was verified. This visit was conducted pursuant to the emergency declaration under the [address] [place] [address], [address] [place] [address] authority and the [employer] General Act. A caregiver was present when appropriate. Ability to conduct physical exam was limited. The patient was located at home in a state where the provider was licensed to provide care.      --Colt Kaiser LCSW on 3/16/2023 at 10:40 AM

## 2023-04-05 RX ORDER — ARIPIPRAZOLE 2 MG/1
TABLET ORAL
Qty: 15 TABLET | Refills: 0 | Status: SHIPPED
Start: 2023-04-05

## 2023-04-05 NOTE — TELEPHONE ENCOUNTER
Last Visit: 1/16/23 NP Tam Landaverde  Next Appointment: None  Previous Refill Encounter(s): 2/27/23 15    Requested Prescriptions     Pending Prescriptions Disp Refills    ARIPiprazole (ABILIFY) 2 mg tablet 15 Tablet 0     Sig: TAKE 1/2 TABLET BY MOUTH DAILY     For Pharmacy Admin Tracking Only    Program: Medication Refill  Intervention Detail: New Rx: 1, reason: Patient Preference  Time Spent (min): 5

## 2023-04-27 ENCOUNTER — VIRTUAL VISIT (OUTPATIENT)
Dept: SOCIAL WORK | Age: 58
End: 2023-04-27
Payer: COMMERCIAL

## 2023-04-27 DIAGNOSIS — F33.1 MAJOR DEPRESSIVE DISORDER, RECURRENT EPISODE, MODERATE (HCC): Primary | ICD-10-CM

## 2023-04-27 PROCEDURE — 90832 PSYTX W PT 30 MINUTES: CPT | Performed by: SOCIAL WORKER

## 2023-04-27 NOTE — PROGRESS NOTES
Virtual Visit (At Home) -Follow Up    Time Start:11:00 am  Time End:11:22 am    DX:     ICD-10-CM ICD-9-CM   1. Major depressive disorder, recurrent episode, moderate (HCC)  F33.1 296.32        Met with Ms. Mejias today for our follow up appt. This patient did not have much to share today. As she owns and operates an accounting business, she has just completed the \"tax season\" where she has been spending long nights and weekends completing her customers taxes. She is now able to focus on getting out and working on her socialization with other women, looking into some volunteer work and playing soccer every weekend. This patient reports she and her long term boyfriend are going out this evening to listen to music and hopefully going away for her birthday in a week. She feels like things are going better with him. Overall, no acute symptoms and the patient feels we can go out one month for a \"check in.\"    We did set a follow-up appointment with this clinician. Follow-up appt date (if applicable) is:  may 25 @ 11:00 VV    . Neal Rubi, who was evaluated through a synchronous (real-time) audio-video encounter, and/or her healthcare decision maker, is aware that it is a billable service, which includes applicable co-pays, with coverage as determined by her insurance carrier. She provided verbal consent to proceed and patient identification was verified. This visit was conducted pursuant to the emergency declaration under the [address] [place] [address], [address] [place] [address] authority and the [employer] and [employer] General Act. A caregiver was present when appropriate. Ability to conduct physical exam was limited. The patient was located at home in a state where the provider was licensed to provide care.      --Nyla Davis LCSW on 4/27/2023 at 12:03 PM

## 2023-05-08 RX ORDER — ARIPIPRAZOLE 2 MG/1
1 TABLET ORAL DAILY
Qty: 45 TABLET | Refills: 1 | Status: SHIPPED | OUTPATIENT
Start: 2023-05-08

## 2023-05-08 NOTE — TELEPHONE ENCOUNTER
Last appointment: 1/16/23 NP Jarred Mena  Next appointment: None  Previous refill encounter(s): 4/5/23 15 (covering provider)    Requested Prescriptions     Pending Prescriptions Disp Refills    ARIPiprazole (ABILIFY) 2 MG tablet 45 tablet 1     Sig: Take 0.5 tablets by mouth daily

## 2023-05-25 ENCOUNTER — TELEMEDICINE (OUTPATIENT)
Age: 58
End: 2023-05-25
Payer: COMMERCIAL

## 2023-05-25 DIAGNOSIS — F33.1 MAJOR DEPRESSIVE DISORDER, RECURRENT, MODERATE (HCC): Primary | ICD-10-CM

## 2023-05-25 PROCEDURE — 90837 PSYTX W PT 60 MINUTES: CPT | Performed by: SOCIAL WORKER

## 2023-05-25 NOTE — PROGRESS NOTES
and patient identification was verified. This visit was conducted pursuant to the emergency declaration under the [address] [facility], [id number] waiver authority and the [employer] and [employer] Act. A caregiver was present when appropriate. Ability to conduct physical exam was limited. The patient was located at home in a state where the provider was licensed to provide care.      --Julisa De La Garza LCSW on 5/25/2023 at 12:08 PM

## 2023-05-26 RX ORDER — BUPROPION HYDROCHLORIDE 300 MG/1
TABLET ORAL
Qty: 90 TABLET | Refills: 1 | Status: SHIPPED | OUTPATIENT
Start: 2023-05-26

## 2023-06-01 ENCOUNTER — TELEMEDICINE (OUTPATIENT)
Age: 58
End: 2023-06-01
Payer: COMMERCIAL

## 2023-06-01 DIAGNOSIS — R53.83 OTHER FATIGUE: ICD-10-CM

## 2023-06-01 DIAGNOSIS — F41.8 MIXED ANXIETY DEPRESSIVE DISORDER: Primary | ICD-10-CM

## 2023-06-01 PROCEDURE — G8427 DOCREV CUR MEDS BY ELIG CLIN: HCPCS | Performed by: NURSE PRACTITIONER

## 2023-06-01 PROCEDURE — 3017F COLORECTAL CA SCREEN DOC REV: CPT | Performed by: NURSE PRACTITIONER

## 2023-06-01 PROCEDURE — 1036F TOBACCO NON-USER: CPT | Performed by: NURSE PRACTITIONER

## 2023-06-01 PROCEDURE — G8419 CALC BMI OUT NRM PARAM NOF/U: HCPCS | Performed by: NURSE PRACTITIONER

## 2023-06-01 PROCEDURE — 99213 OFFICE O/P EST LOW 20 MIN: CPT | Performed by: NURSE PRACTITIONER

## 2023-06-01 RX ORDER — ARIPIPRAZOLE 2 MG/1
2 TABLET ORAL DAILY
Qty: 90 TABLET | Refills: 1 | Status: SHIPPED | OUTPATIENT
Start: 2023-06-01

## 2023-06-01 SDOH — ECONOMIC STABILITY: FOOD INSECURITY: WITHIN THE PAST 12 MONTHS, THE FOOD YOU BOUGHT JUST DIDN'T LAST AND YOU DIDN'T HAVE MONEY TO GET MORE.: NEVER TRUE

## 2023-06-01 SDOH — ECONOMIC STABILITY: HOUSING INSECURITY
IN THE LAST 12 MONTHS, WAS THERE A TIME WHEN YOU DID NOT HAVE A STEADY PLACE TO SLEEP OR SLEPT IN A SHELTER (INCLUDING NOW)?: NO

## 2023-06-01 SDOH — ECONOMIC STABILITY: INCOME INSECURITY: HOW HARD IS IT FOR YOU TO PAY FOR THE VERY BASICS LIKE FOOD, HOUSING, MEDICAL CARE, AND HEATING?: NOT HARD AT ALL

## 2023-06-01 SDOH — ECONOMIC STABILITY: FOOD INSECURITY: WITHIN THE PAST 12 MONTHS, YOU WORRIED THAT YOUR FOOD WOULD RUN OUT BEFORE YOU GOT MONEY TO BUY MORE.: NEVER TRUE

## 2023-06-01 ASSESSMENT — PATIENT HEALTH QUESTIONNAIRE - PHQ9
SUM OF ALL RESPONSES TO PHQ QUESTIONS 1-9: 7
4. FEELING TIRED OR HAVING LITTLE ENERGY: 1
5. POOR APPETITE OR OVEREATING: 2
7. TROUBLE CONCENTRATING ON THINGS, SUCH AS READING THE NEWSPAPER OR WATCHING TELEVISION: 1
SUM OF ALL RESPONSES TO PHQ QUESTIONS 1-9: 7
9. THOUGHTS THAT YOU WOULD BE BETTER OFF DEAD, OR OF HURTING YOURSELF: 0
1. LITTLE INTEREST OR PLEASURE IN DOING THINGS: 1
SUM OF ALL RESPONSES TO PHQ QUESTIONS 1-9: 7
2. FEELING DOWN, DEPRESSED OR HOPELESS: 1
6. FEELING BAD ABOUT YOURSELF - OR THAT YOU ARE A FAILURE OR HAVE LET YOURSELF OR YOUR FAMILY DOWN: 1
3. TROUBLE FALLING OR STAYING ASLEEP: 0
8. MOVING OR SPEAKING SO SLOWLY THAT OTHER PEOPLE COULD HAVE NOTICED. OR THE OPPOSITE, BEING SO FIGETY OR RESTLESS THAT YOU HAVE BEEN MOVING AROUND A LOT MORE THAN USUAL: 0
SUM OF ALL RESPONSES TO PHQ QUESTIONS 1-9: 7
10. IF YOU CHECKED OFF ANY PROBLEMS, HOW DIFFICULT HAVE THESE PROBLEMS MADE IT FOR YOU TO DO YOUR WORK, TAKE CARE OF THINGS AT HOME, OR GET ALONG WITH OTHER PEOPLE: 1
SUM OF ALL RESPONSES TO PHQ9 QUESTIONS 1 & 2: 2

## 2023-06-01 ASSESSMENT — ENCOUNTER SYMPTOMS
RESPIRATORY NEGATIVE: 1
GASTROINTESTINAL NEGATIVE: 1

## 2023-06-01 NOTE — PROGRESS NOTES
[facility]  Virtual Clinic Note    Anurag Cruz (:  1965) is a Established patient, presenting virtually for evaluation of the following:      ASSESSMENT & PLAN:    Diagnoses and all orders for this visit:  Mixed anxiety depressive disorder  -Has previously been on fluoxetine and escitalopram.  She had been transition to paroxetine at some point due to plateauing  - INCREASE  ARIPiprazole (ABILIFY) 2 MG tablet; Take 1 tablet by mouth daily   We explored potential options and dose adjustment in her medications versus  changing class or staying within SSRI family. She agrees to contact provider should she wish to proceed  with the above-noted changes. Explored nonmedicinal management to include sleep hygiene, eating healthy, and exercise.  -Continue bupropion 300 mg daily  -Recommended establishing care with psychiatric practice that also provide supportive counseling services/behavioral therapy. Other fatigue  -     [address] Franklin MD, Sleep Medicine, [place] ([address])  -This has been recommended by other providers as well. She is agreeable to this. Return in about 3 weeks (around 2023) for medication follow up. SUBJECTIVE:    Anurag Cruz is seen today for   Chief Complaint   Patient presents with    Depression     Reports not feeling herself. Working with Panfilo Chan regularly for counseling support. Has trouble getting going in the  morning, sometimes  feels \"ok\" others \"jittery\". REVIEW OF SYSYEMS:  Review of Systems   Constitutional:  Positive for fatigue. Negative for fever. HENT: Negative. Respiratory: Negative. Cardiovascular: Negative. Gastrointestinal: Negative. Genitourinary: Negative. Musculoskeletal: Negative. Skin: Negative. Neurological: Negative. Psychiatric/Behavioral:  Positive for decreased concentration and dysphoric mood. Negative for self-injury, sleep disturbance and suicidal ideas.  The

## 2023-07-06 ENCOUNTER — TELEMEDICINE (OUTPATIENT)
Age: 58
End: 2023-07-06
Payer: COMMERCIAL

## 2023-07-06 DIAGNOSIS — F33.1 MAJOR DEPRESSIVE DISORDER, RECURRENT, MODERATE (HCC): Primary | ICD-10-CM

## 2023-07-06 PROCEDURE — 90832 PSYTX W PT 30 MINUTES: CPT | Performed by: SOCIAL WORKER

## 2023-07-06 NOTE — PROGRESS NOTES
Virtual Visit (At Home) -Follow Up    Time Start:11:00 am  Time End:11;24 am    DX:    Diagnosis Orders   1. Major depressive disorder, recurrent, moderate ([address] [facility])             Met with Ms. Wallace today for our follow up appt. This patient reports she has been doing pretty well. She occasionally has her \"down\" days but they are nothing like they were before taking medication. Ms. Razia Choi is attending concerts and getting away with her boyfriend this summer and having a good time. She reports some frustration with this boyfriend as he does not have a job and stays in the house all day \"doing nothing. \"  This therapist prompted her to discuss her concerns with him. NO acute symptoms reported or observed today. She has gone to a rotary meeting and plans on attending another one in a different part of town to meet people and mingle for both personal and professional purposes. We did set a follow-up appointment with this clinician. Follow-up appt date (if applicable) is:  July 27 @  [address], who was evaluated through a synchronous (real-time) audio-video encounter, and/or her healthcare decision maker, is aware that it is a billable service, which includes applicable co-pays, with coverage as determined by her insurance carrier. She provided verbal consent to proceed and patient identification was verified. This visit was conducted pursuant to the emergency declaration under the [address] and the [employer] and [employer] Act. A caregiver was present when appropriate. Ability to conduct physical exam was limited. The patient was located at home in a state where the provider was licensed to provide care.      --Nayana Romero LCSW on 7/6/2023 at 11:33 AM

## 2023-07-24 ENCOUNTER — TELEPHONE (OUTPATIENT)
Age: 58
End: 2023-07-24

## 2023-07-24 NOTE — TELEPHONE ENCOUNTER
----- Message from Peggy Zhao sent at 7/21/2023  1:07 PM EDT -----  Subject: Appointment Request    Reason for Call: Established Patient Appointment needed: Urgent (Patient   Request) No Script    QUESTIONS    Reason for appointment request? Available appointments did not meet   patient need     Additional Information for Provider? Patient has not been feeling well.    She has been losing weight due to loss of appetite, she is needing to get   in ASAP, please advise  ---------------------------------------------------------------------------  --------------  Veronica Marine Jes  [phone]; OK to leave message on voicemail  ---------------------------------------------------------------------------  --------------  SCRIPT ANSWERS

## 2023-07-24 NOTE — TELEPHONE ENCOUNTER
Called pt and spoke to her regarding an OV for loosing weight. Inform pt unfortunately NP Carlos Manuel Jiménez next available is 8/24 and offer VV pt declined appt and said she will call back.  HB 07/24/23

## 2023-08-01 ENCOUNTER — TELEMEDICINE (OUTPATIENT)
Age: 58
End: 2023-08-01
Payer: COMMERCIAL

## 2023-08-01 DIAGNOSIS — F33.1 MAJOR DEPRESSIVE DISORDER, RECURRENT, MODERATE (HCC): Primary | ICD-10-CM

## 2023-08-01 PROCEDURE — 90832 PSYTX W PT 30 MINUTES: CPT | Performed by: SOCIAL WORKER

## 2023-08-01 NOTE — PROGRESS NOTES
Virtual Visit (At Home) -Follow Up    Time Start:11:00 am  Time End:11:30 am    DX:    Diagnosis Orders   1. Major depressive disorder, recurrent, moderate ([address])             Met with Ms. Wallace today for our follow up appt. This patient states she is not sure if she is experiencing a depressive episode or just some burn out from her job. She is staying in bed longer than she should in the morning and dreading going to work. Later in the day she is fine but does see a decline in her focus and motivation/performance at work. As she owns her own business, we discussed maybe taking some time for herself or looking at changing her office around, etc.  Additionally we talked about her getting up at a specific time and taking her dogs for a walk, maybe putting on ear buds and listening to music. As the weather is less humid this week, to maybe try this during the work week to get herself going. Ms. Beni Jones thought that might be a good idea and will try changing things up. She reports she is continuing to play soccer on the weekends and is attending exercise classes after work. She just got back from a music festival in [place] this past weekend. Her speech is monotone and does not sound particularly happy at this time. She is not reporting any acute symptoms now. We discussed her medications and she was also provided with several psychiatric practices that have some psychiatrists/np's that have possible openings, if she wanted to have psych prescribe versus her PCP. Ms. Beni Jones reported that her PCP had suggested she may want to be seen by a psychiatrist for her ongoing psychiatric needs. We did set a follow-up appointment with this clinician. Follow-up appt date (if applicable) is:  August 24 @ [address] was evaluated through a synchronous (real-time) audio-video encounter.  The patient (or guardian if applicable) is aware that this is a billable service, which

## 2023-08-24 ENCOUNTER — TELEMEDICINE (OUTPATIENT)
Age: 58
End: 2023-08-24
Payer: COMMERCIAL

## 2023-08-24 DIAGNOSIS — F33.1 MAJOR DEPRESSIVE DISORDER, RECURRENT, MODERATE (HCC): Primary | ICD-10-CM

## 2023-08-24 PROCEDURE — 90832 PSYTX W PT 30 MINUTES: CPT | Performed by: SOCIAL WORKER

## 2023-08-24 NOTE — PROGRESS NOTES
deliver care in the state where the patient is located as indicated above. If you are not or unsure, please re-schedule the visit. Are you appropriately licensed in the patient's state?: Yes         Total time spent for this encounter:  24 min    --Alejandra Dawson LCSW on 8/24/2023 at 12:01 PM    An electronic signature was used to authenticate this note.

## 2023-08-28 RX ORDER — HYDROCHLOROTHIAZIDE 25 MG/1
TABLET ORAL
Qty: 90 TABLET | Refills: 1 | Status: SHIPPED | OUTPATIENT
Start: 2023-08-28

## 2023-08-28 NOTE — TELEPHONE ENCOUNTER
PCP: MAGALIS Easton NP    Last appt: 6/1/2023   Future Appointments   Date Time Provider [address]   9/28/2023 10:00 AM TORITO Hawthorne AMB       Requested Prescriptions     Pending Prescriptions Disp Refills    hydroCHLOROthiazide (HYDRODIURIL) 25 MG tablet [Pharmacy Med Name: hydroCHLOROthiazide 25 MG TABLET] 30 tablet      Sig: TAKE ONE TABLET BY MOUTH DAILY FOR BLOOD PRESSURE         Prior labs and Blood pressures:  BP Readings from Last 3 Encounters:   01/16/23 131/86   11/09/22 122/80   10/04/22 134/82     Lab Results   Component Value Date/Time     10/04/2022 04:02 PM    K 4.2 10/04/2022 04:02 PM     10/04/2022 04:02 PM    CO2 31 10/04/2022 04:02 PM    BUN 17 10/04/2022 04:02 PM    GFRAA >60 11/24/2021 11:59 AM     No results found for: HBA1C, OBB1FHTR  Lab Results   Component Value Date/Time    CHOL 225 10/04/2022 04:02 PM    HDL 86 10/04/2022 04:02 PM     No results found for: VITD3, VD3RIA    Lab Results   Component Value Date/Time    TSH 1.59 10/04/2022 04:02 PM

## 2023-09-28 ENCOUNTER — TELEMEDICINE (OUTPATIENT)
Age: 58
End: 2023-09-28
Payer: COMMERCIAL

## 2023-09-28 DIAGNOSIS — F33.1 MAJOR DEPRESSIVE DISORDER, RECURRENT, MODERATE (HCC): Primary | ICD-10-CM

## 2023-09-28 PROCEDURE — 90832 PSYTX W PT 30 MINUTES: CPT | Performed by: SOCIAL WORKER

## 2023-09-28 NOTE — PROGRESS NOTES
Virtual Visit (At Home) -Follow Up    Time Start:10:00 am  Time End:10:34 am    DX:    Diagnosis Orders   1. Major depressive disorder, recurrent, moderate ([address] [facility])             Met with Ms. Wallace today for our follow up appt. This patient reports she is doing well. Her psychiatrist is trying to reduce her Paxil from 60 mg to 40 mg. And they have another appt in November. Dr. Redd Garcia would like to take her off Paxil and look at another anti-depressant that he thinks may be better. Additionally, he has taken her off the 1 mg. Of Abilify, stating that most people take 2-5 mg. And he doesn't think the 1 mg. Is doing anything for her. The patient has been reduced to the 40 mg. For the past 30 days and has not had any adverse effects. She reports she and her boyfriend are doing okay but he still is unmotivated and not working in a traditional job. She feels like that lack of structure in his life makes him lazy and unmotivated. Her mother is doing well and that is a positive for this patient due to their close bond. NO acute symptoms reported at this time. We did set a follow-up appointment with this clinician. Follow-up appt date (if applicable) is:  Nov. 9 @ 11 SOILA Wallace, was evaluated through a synchronous (real-time) audio-video encounter. The patient (or guardian if applicable) is aware that this is a billable service, which includes applicable co-pays. This Virtual Visit was conducted with patient's (and/or legal guardian's) consent. Patient identification was verified, and a caregiver was present when appropriate. The patient was located at Home: [address] [place] [phone]  Provider was located at Home ([address]): [place]         Total time spent for this encounter:  34 minutes    --Elvira Billingsley LCSW on 9/28/2023 at 11:01 AM    An electronic signature was used to authenticate this note.

## 2023-11-16 ENCOUNTER — TELEMEDICINE (OUTPATIENT)
Age: 58
End: 2023-11-16

## 2023-11-16 DIAGNOSIS — F33.1 MAJOR DEPRESSIVE DISORDER, RECURRENT, MODERATE (HCC): Primary | ICD-10-CM

## 2023-11-16 NOTE — PROGRESS NOTES
Virtual Visit (At Home) -Follow Up    Time Start:3:00 pm  Time End:3:36 pm    DX:    Diagnosis Orders   1. Major depressive disorder, recurrent, moderate ([address])             Met with Ms. Wallace today for our follow up appt. This patient reports she had an \"episode\" where she was off her Abilify and felt like she was getting more depressed, not sleeping well and no energy. She has not had such an episode in a long time. Her NP psych took her off the 1 mg. Of Abilify but apparently that change put her in a tailspin. She is meeting with Mr. Tahir Cruz tomorrow (NP-[facility]) to see how she is doing. She is now on 2 mg. Abilify and is reduced to 40 mg. Of Prozac. Eventually he would like to take her off Prozac and try a different medication. As December is a hard month for her, she doesn't want to make any changes now. She lost both her father and her sister in the month of December and reports it is a hard time for she and her mother. We discussed the world events recently in regard to the [employer] and she expressed her sadness over this. Ms. Sondra Adamson has started attending the [employer] and is enjoying meeting new people. She also has picked up pickle ball and playing this sport. No acute symptoms reported. We did set a follow-up appointment with this clinician. Follow-up appt date (if applicable) is:  Dec. 28 @ [address] was evaluated through a synchronous (real-time) audio-video encounter. The patient (or guardian if applicable) is aware that this is a billable service, which includes applicable co-pays. This Virtual Visit was conducted with patient's (and/or legal guardian's) consent. Patient identification was verified, and a caregiver was present when appropriate.    The patient was located at Home: [address] [place] [id number]  Provider was located at Home ([address]): [place]         Total time spent for this encounter:  39

## 2023-12-28 ENCOUNTER — TELEMEDICINE (OUTPATIENT)
Age: 58
End: 2023-12-28
Payer: COMMERCIAL

## 2023-12-28 DIAGNOSIS — F33.1 MAJOR DEPRESSIVE DISORDER, RECURRENT, MODERATE (HCC): Primary | ICD-10-CM

## 2023-12-28 PROCEDURE — 90832 PSYTX W PT 30 MINUTES: CPT | Performed by: SOCIAL WORKER

## 2023-12-28 NOTE — PROGRESS NOTES
Virtual Visit (At Home) -Follow Up    Time Start:11:00 am  Time End:11:31 am    DX:    Diagnosis Orders   1. Major depressive disorder, recurrent, moderate ([address] [facility])             Met with Ms. Wallace today for our follow up appt. This patient reports she had a quiet Jenna as she is [employer]ian but her boyfriend does celebrate Jenna. She reports that December is usually a very hard month as her father and sister have passed away and her sister's birthday is [place] Day. Her mother chose not to participate in any type of celebration with the patient. Although it normally is a difficult month, the patient states she got through this month better this year. She and boyfriend are attending a weekend long festival at a hotel where they will stay and have concerts two separate evenings. She is excited and looking forward to getting out of town. 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 \"  Overall, pt doing well. She continues to go to the gym and work out. She is now a member of [employer] and has participated in several of their activities for [place]. No acute symptoms are reported. We did set a follow-up appointment with this clinician. Follow-up appt date (if applicable) is:  Jan. 25 @ 1 SOILA Wallace, was evaluated through a synchronous (real-time) audio-video encounter. The patient (or guardian if applicable) is aware that this is a billable service, which includes applicable co-pays. This Virtual Visit was conducted with patient's (and/or legal guardian's) consent. Patient identification was verified, and a caregiver was present when appropriate.    The patient was located at Home: [address] [place] [phone]  Provider was located at Home ([address] [facility]): [place]         Total time spent for this encounter:  31 minutes    --Giovanna Blackmon LCSW on 12/28/2023 at 11:34

## 2024-01-25 ENCOUNTER — TELEMEDICINE (OUTPATIENT)
Age: 59
End: 2024-01-25
Payer: COMMERCIAL

## 2024-01-25 DIAGNOSIS — F33.1 MAJOR DEPRESSIVE DISORDER, RECURRENT, MODERATE (HCC): Primary | ICD-10-CM

## 2024-01-25 PROCEDURE — 90832 PSYTX W PT 30 MINUTES: CPT | Performed by: SOCIAL WORKER

## 2024-01-25 NOTE — PROGRESS NOTES
[place] [phone]  Provider was located at Home (Appt Dept State): [place]         Total time spent for this encounter:  30 minutes    --Mary Martinez LCSW on 1/25/2024 at 2:34 PM    An electronic signature was used to authenticate this note.

## 2024-02-22 ENCOUNTER — TELEMEDICINE (OUTPATIENT)
Age: 59
End: 2024-02-22
Payer: COMMERCIAL

## 2024-02-22 DIAGNOSIS — F33.1 MAJOR DEPRESSIVE DISORDER, RECURRENT, MODERATE (HCC): Primary | ICD-10-CM

## 2024-02-22 PROCEDURE — 90834 PSYTX W PT 45 MINUTES: CPT | Performed by: SOCIAL WORKER

## 2024-02-22 NOTE — PROGRESS NOTES
Virtual Visit (At Home) -Follow Up    Time Start:2:10 pm  Time End:2:50 pm    DX:    Diagnosis Orders   1. Major depressive disorder, recurrent, moderate (HCC)             Met with Ms. Wallace today for our follow up appt. This patient had to miss 2 1/2 days of work due to a recent cold.  She reports she is feeling overwhelmed at work and also has felt significantly more depressed.  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  She does meet with her psych NP next Friday so she will be able to talk to him if there is a change in her medication.  She happily reports that her boyfriend has started participating in some of her work functions and she is glad to have him by her side.  He still has not gone to her soccer games but she is happy for what he is doing at this time.  Ms. Wallace states that her house is making her feel \"gloomy\" because it has gotten very cluttered and she and boyfriend really need to go thru boxes and throw stuff out.  Overall, the patient is happy she has lots of work as she has had all this business thru word of mouth and her business is flourishing.  On the other hand, she has lots of work to do.  No acute symptoms reported-just a bit more depressed recently.  Pt will contact this therapist is she needs an appt sooner.    We did set a follow-up appointment with this clinician.      Follow-up appt date (if applicable) is:  March 19 @ 2 VV.    Ivis Wallace, was evaluated through a synchronous (real-time) audio-video encounter. The patient (or guardian if applicable) is aware that this is a billable service, which includes applicable co-pays. This Virtual Visit was conducted with patient's (and/or legal guardian's) consent. Patient identification was verified, and a caregiver was present when appropriate.   The patient was located at Home: [id number]

## 2024-03-05 LAB — MAMMOGRAPHY, EXTERNAL: NORMAL

## 2024-03-19 ENCOUNTER — TELEMEDICINE (OUTPATIENT)
Age: 59
End: 2024-03-19
Payer: COMMERCIAL

## 2024-03-19 DIAGNOSIS — F33.1 MAJOR DEPRESSIVE DISORDER, RECURRENT, MODERATE (HCC): Primary | ICD-10-CM

## 2024-03-19 PROCEDURE — 90832 PSYTX W PT 30 MINUTES: CPT | Performed by: SOCIAL WORKER

## 2024-03-19 NOTE — PROGRESS NOTES
Virtual Visit (At Home) -Follow Up    Time Start:2:01 pm  Time End:2:28 pm    DX:    Diagnosis Orders   1. Major depressive disorder, recurrent, moderate (HCC)             Met with Ms. Wallace today for our follow up appt.  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  She has hired a few other part timers to assist with the heavy caseload but feels like she is doing better, \"staying ahead of the game\" than last year.  She reports she has worked some long hours but continues to play soccer on the weekends and last weekend she was a volunteer at the [employer].  She is trying to maintain her health by getting enough sleep, eating well, and drinking water during the day.  She has not gone to gym since tax season started but after May 1, she feels like she can go back to her normal routine.  She continues to report feeling good about her boyfriend and they have a trip to [place] at the end of May for a wedding on his side of family.  No acute symptoms reported.  She has a meeting scheduled with her psych doctor in mid may as she did not want to make any changes to her medications during tax season.  She thinks her doctor wants to get her off Paxil and look at something else since Paxil is an older drug.      We did set a follow-up appointment with this clinician.      Follow-up appt date (if applicable) is:  April 25 @ 11 SOILA Wallace, was evaluated through a synchronous (real-time) audio-video encounter. The patient (or guardian if applicable) is aware that this is a billable service, which includes applicable co-pays. This Virtual Visit was conducted with patient's (and/or legal guardian's) consent. Patient identification was verified, and a caregiver was present when appropriate.   The patient was located at Home: [address] [place] [address] [address] [place] [phone]  Provider was located at Home (Appt Dept [place]         Total time spent for this encounter:

## 2024-04-25 ENCOUNTER — TELEMEDICINE (OUTPATIENT)
Age: 59
End: 2024-04-25
Payer: COMMERCIAL

## 2024-04-25 DIAGNOSIS — F33.1 MAJOR DEPRESSIVE DISORDER, RECURRENT, MODERATE (HCC): Primary | ICD-10-CM

## 2024-04-25 PROCEDURE — 90834 PSYTX W PT 45 MINUTES: CPT | Performed by: SOCIAL WORKER

## 2024-04-25 NOTE — PROGRESS NOTES
Virtual Visit (At Home) -Follow Up    Time Start:11:00 am  Time End:11:42 am    DX:    Diagnosis Orders   1. Major depressive disorder, recurrent, moderate (HCC)           Met with Ms. Wallace today for our follow up appt. : This patient reports she feels like she is having her 3 month depressive period again.  She reports that about every three months she has a bout of depression and can't really figure out why.  This time, however, she does feel some of her lethargy and lack of motivation has to do with the ending of her long hours of work during tax season.  She was working 50-60 hour weeks during that time and now she has slowed down considerably.  Additionally, she states her birthday is in two weeks and she will be 59.  Her partner nor her mother has said anything about this birthday and she doesn't think anyone will do anything special.  Ms. Wallace goes on to say that she and her partner have become too comfortable in their relationship and she feels like she does what he wants versus taking into consideration her wants and desires.  She does not like confrontation but feels they need to have a talk.  We discussed perhaps \"suggesting\" some options for her birthday and see if he initiates something from that.  The patient states that this is the time when she misses her sister the most because they would just go off on a trip together anywhere.  She does not have a friend like that today.  NO acute symptoms reported.    We set a follow-up appointment with this clinician.      Follow-up appt date (if applicable) is:  May 16 @ 11 SOILA Wallace, was evaluated through a synchronous (real-time) audio-video encounter. The patient (or guardian if applicable) is aware that this is a billable service, which includes applicable co-pays. This Virtual Visit was conducted with patient's (and/or legal guardian's) consent. Patient identification was verified, and a caregiver was present when

## 2024-04-29 ENCOUNTER — TELEPHONE (OUTPATIENT)
Age: 59
End: 2024-04-29

## 2024-04-29 NOTE — TELEPHONE ENCOUNTER
Patient called and stated that she was bit by her neighbor dog on 4/28. She wanted to know do she need to come in and get a Tetanus shot? Do she need to report it?    Call back at [phone]

## 2024-04-30 ENCOUNTER — OFFICE VISIT (OUTPATIENT)
Age: 59
End: 2024-04-30

## 2024-04-30 VITALS
SYSTOLIC BLOOD PRESSURE: 105 MMHG | TEMPERATURE: 97.8 F | BODY MASS INDEX: 29.11 KG/M2 | DIASTOLIC BLOOD PRESSURE: 64 MMHG | OXYGEN SATURATION: 98 % | HEIGHT: 61 IN | HEART RATE: 56 BPM | WEIGHT: 154.2 LBS
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
## 2024-04-30 RX ORDER — AMOXICILLIN AND CLAVULANATE POTASSIUM 875; 125 MG/1; MG/1
1 TABLET, FILM COATED ORAL 2 TIMES DAILY
Qty: 14 TABLET | Refills: 0 | Status: SHIPPED | OUTPATIENT
Start: 2024-04-30 | End: 2024-05-03

## 2024-04-30 RX ORDER — ARIPIPRAZOLE 5 MG/1
5 TABLET ORAL DAILY
COMMUNITY
Start: 2024-04-18

## 2024-04-30 ASSESSMENT — PATIENT HEALTH QUESTIONNAIRE - PHQ9
SUM OF ALL RESPONSES TO PHQ QUESTIONS 1-9: 2
9. THOUGHTS THAT YOU WOULD BE BETTER OFF DEAD, OR OF HURTING YOURSELF: NOT AT ALL
6. FEELING BAD ABOUT YOURSELF - OR THAT YOU ARE A FAILURE OR HAVE LET YOURSELF OR YOUR FAMILY DOWN: NOT AT ALL
4. FEELING TIRED OR HAVING LITTLE ENERGY: NOT AT ALL
SUM OF ALL RESPONSES TO PHQ QUESTIONS 1-9: 2
SUM OF ALL RESPONSES TO PHQ QUESTIONS 1-9: 2
5. POOR APPETITE OR OVEREATING: NOT AT ALL
2. FEELING DOWN, DEPRESSED OR HOPELESS: SEVERAL DAYS
7. TROUBLE CONCENTRATING ON THINGS, SUCH AS READING THE NEWSPAPER OR WATCHING TELEVISION: NOT AT ALL
1. LITTLE INTEREST OR PLEASURE IN DOING THINGS: SEVERAL DAYS
10. IF YOU CHECKED OFF ANY PROBLEMS, HOW DIFFICULT HAVE THESE PROBLEMS MADE IT FOR YOU TO DO YOUR WORK, TAKE CARE OF THINGS AT HOME, OR GET ALONG WITH OTHER PEOPLE: NOT DIFFICULT AT ALL
8. MOVING OR SPEAKING SO SLOWLY THAT OTHER PEOPLE COULD HAVE NOTICED. OR THE OPPOSITE, BEING SO FIGETY OR RESTLESS THAT YOU HAVE BEEN MOVING AROUND A LOT MORE THAN USUAL: NOT AT ALL
SUM OF ALL RESPONSES TO PHQ9 QUESTIONS 1 & 2: 2
SUM OF ALL RESPONSES TO PHQ QUESTIONS 1-9: 2
3. TROUBLE FALLING OR STAYING ASLEEP: NOT AT ALL

## 2024-04-30 NOTE — PROGRESS NOTES
Chief Complaint   Patient presents with    Animal Bite     Bit by dog. Two punctured holes on left thigh about half an inch deep. Bruised. Happened Sunday afternoon.      \"Have you been to the ER, urgent care clinic since your last visit?  Hospitalized since your last visit?\"    NO    “Have you seen or consulted any other health care providers outside of [facility] since your last visit?”    NO       Have you had a mammogram?”   NO    Date of last Mammogram: 2/2/2022      “Have you had a pap smear?”    NO    Date of last Cervical Cancer screen (HPV or PAP): 11/3/2020                 4/30/2024    11:03 AM   PHQ-9    Little interest or pleasure in doing things 1   Feeling down, depressed, or hopeless 1   Trouble falling or staying asleep, or sleeping too much 0   Feeling tired or having little energy 0   Poor appetite or overeating 0   Feeling bad about yourself - or that you are a failure or have let yourself or your family down 0   Trouble concentrating on things, such as reading the newspaper or watching television 0   Moving or speaking so slowly that other people could have noticed. Or the opposite - being so fidgety or restless that you have been moving around a lot more than usual 0   Thoughts that you would be better off dead, or of hurting yourself in some way 0   PHQ-2 Score 2   PHQ-9 Total Score 2   If you checked off any problems, how difficult have these problems made it for you to do your work, take care of things at home, or get along with other people? 0           Financial Resource Strain: Low Risk  (6/1/2023)    Overall Financial Resource Strain ([facility])     Difficulty of Paying Living Expenses: Not hard at all      Food Insecurity: Not on file (6/1/2023)          Health Maintenance Due   Topic Date Due    Hepatitis B vaccine (1 of 3 - 3-dose series) Never done    HIV screen  Never done    Hepatitis C screen  Never done    Shingles vaccine (1 of 2) Never done    Breast cancer screen  
Patient Name: Ivis Wallace   MRN: [id number]    SUBJECTIVE  Ivis Wallace is a 58 y.o. female who presents with the following: bit on thigh by neighbors bonnie tamayo on Sunday evening. Reports the dog is up to date on rabies vaccine and is mostly inside. Has been using polysporin on wound. Denies any drainage or surround erythema.     ROS negative unless specified in HPI    The patient's medications, allergies, past medical history, surgical history, family history and social history were reviewed and updated where appropriate.    Current Outpatient Medications on File Prior to Visit   Medication Sig Dispense Refill    ARIPiprazole (ABILIFY) 5 MG tablet Take 1 tablet by mouth daily      hydroCHLOROthiazide (HYDRODIURIL) 25 MG tablet TAKE ONE TABLET BY MOUTH DAILY FOR BLOOD PRESSURE 90 tablet 1    buPROPion (WELLBUTRIN XL) 300 MG extended release tablet TAKE ONE TABLET BY MOUTH EVERY MORNING 90 tablet 1    ALPRAZolam (XANAX) 0.5 MG tablet Take 1 tablet by mouth daily as needed.      PARoxetine (PAXIL) 40 MG tablet Take 1.5 tablets by mouth daily      ARIPiprazole (ABILIFY) 2 MG tablet Take 1 tablet by mouth daily (Patient not taking: Reported on 4/30/2024) 90 tablet 1    Cholecalciferol 50 MCG (2000 UT) CAPS Take 2,000 Units by mouth daily (Patient not taking: Reported on 6/1/2023)       No current facility-administered medications on file prior to visit.       No Known Allergies    OBJECTIVE    /64 (Site: Left Upper Arm, Position: Sitting, Cuff Size: Small Adult)   Pulse 56   Temp 97.8 °F (36.6 °C) (Temporal)   Ht 1.549 m (5' 1\")   Wt 69.9 kg (154 lb 3.2 oz)   SpO2 98%   BMI 29.14 kg/m²      Physical Exam  Constitutional:       General: She is not in acute distress.  Cardiovascular:      Rate and Rhythm: Regular rhythm.      Heart sounds: No murmur heard.  Pulmonary:      Effort: Pulmonary effort is normal.      Breath sounds: Normal breath sounds. No wheezing.   Abdominal:      General: Bowel 
Yes

## 2024-04-30 NOTE — TELEPHONE ENCOUNTER
Pt's scheduled to see AYANA Acevedo on 4/30/24 @ 10:40 AM per (Nilsa) she stated make it an Acute Appt.

## 2024-05-03 ENCOUNTER — OFFICE VISIT (OUTPATIENT)
Age: 59
End: 2024-05-03

## 2024-05-03 VITALS
BODY MASS INDEX: 28.4 KG/M2 | OXYGEN SATURATION: 99 % | RESPIRATION RATE: 16 BRPM | HEART RATE: 79 BPM | DIASTOLIC BLOOD PRESSURE: 78 MMHG | SYSTOLIC BLOOD PRESSURE: 120 MMHG | TEMPERATURE: 97.5 F | HEIGHT: 61 IN | WEIGHT: 150.4 LBS

## 2024-05-03 DIAGNOSIS — F33.2 SEVERE EPISODE OF RECURRENT MAJOR DEPRESSIVE DISORDER, WITHOUT PSYCHOTIC FEATURES (HCC): ICD-10-CM
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
## 2024-05-03 DIAGNOSIS — Z11.4 SCREENING FOR HIV WITHOUT PRESENCE OF RISK FACTORS: ICD-10-CM

## 2024-05-03 DIAGNOSIS — Z72.89 OTHER PROBLEMS RELATED TO LIFESTYLE: ICD-10-CM

## 2024-05-03 DIAGNOSIS — Z11.59 NEED FOR HEPATITIS C SCREENING TEST: ICD-10-CM

## 2024-05-03 DIAGNOSIS — Z00.00 WELL WOMAN EXAM WITHOUT GYNECOLOGICAL EXAM: Primary | ICD-10-CM

## 2024-05-03 DIAGNOSIS — I10 ESSENTIAL HYPERTENSION: ICD-10-CM

## 2024-05-03 LAB
ALBUMIN SERPL-MCNC: 3.7 G/DL (ref 3.5–5)
ALBUMIN/GLOB SERPL: 1 (ref 1.1–2.2)
ALP SERPL-CCNC: 86 U/L (ref 45–117)
ALT SERPL-CCNC: 21 U/L (ref 12–78)
ANION GAP SERPL CALC-SCNC: 6 MMOL/L (ref 5–15)
AST SERPL-CCNC: 19 U/L (ref 15–37)
BASOPHILS # BLD: 0 K/UL (ref 0–0.1)
BASOPHILS NFR BLD: 1 % (ref 0–1)
BILIRUB SERPL-MCNC: 0.4 MG/DL (ref 0.2–1)
BUN SERPL-MCNC: 18 MG/DL (ref 6–20)
BUN/CREAT SERPL: 21 (ref 12–20)
CALCIUM SERPL-MCNC: 9.6 MG/DL (ref 8.5–10.1)
CHLORIDE SERPL-SCNC: 104 MMOL/L (ref 97–108)
CHOLEST SERPL-MCNC: 201 MG/DL
CO2 SERPL-SCNC: 30 MMOL/L (ref 21–32)
CREAT SERPL-MCNC: 0.86 MG/DL (ref 0.55–1.02)
DIFFERENTIAL METHOD BLD: NORMAL
EOSINOPHIL # BLD: 0 K/UL (ref 0–0.4)
EOSINOPHIL NFR BLD: 1 % (ref 0–7)
ERYTHROCYTE [DISTWIDTH] IN BLOOD BY AUTOMATED COUNT: 12.2 % (ref 11.5–14.5)
GLOBULIN SER CALC-MCNC: 3.6 G/DL (ref 2–4)
GLUCOSE SERPL-MCNC: 93 MG/DL (ref 65–100)
HCT VFR BLD AUTO: 39.2 % (ref 35–47)
HCV AB SER IA-ACNC: 0.04 INDEX
HCV AB SERPL QL IA: NONREACTIVE
HDLC SERPL-MCNC: 86 MG/DL
HDLC SERPL: 2.3 (ref 0–5)
HGB BLD-MCNC: 13.2 G/DL (ref 11.5–16)
HIV 1+2 AB+HIV1 P24 AG SERPL QL IA: NONREACTIVE
HIV 1/2 RESULT COMMENT: NORMAL
IMM GRANULOCYTES # BLD AUTO: 0 K/UL (ref 0–0.04)
IMM GRANULOCYTES NFR BLD AUTO: 0 % (ref 0–0.5)
LDLC SERPL CALC-MCNC: 102.2 MG/DL (ref 0–100)
LYMPHOCYTES # BLD: 0.9 K/UL (ref 0.8–3.5)
LYMPHOCYTES NFR BLD: 19 % (ref 12–49)
MCH RBC QN AUTO: 28.3 PG (ref 26–34)
MCHC RBC AUTO-ENTMCNC: 33.7 G/DL (ref 30–36.5)
MCV RBC AUTO: 84.1 FL (ref 80–99)
MONOCYTES # BLD: 0.4 K/UL (ref 0–1)
MONOCYTES NFR BLD: 9 % (ref 5–13)
NEUTS SEG # BLD: 3.4 K/UL (ref 1.8–8)
NEUTS SEG NFR BLD: 70 % (ref 32–75)
NRBC # BLD: 0 K/UL (ref 0–0.01)
NRBC BLD-RTO: 0 PER 100 WBC
PLATELET # BLD AUTO: 345 K/UL (ref 150–400)
PMV BLD AUTO: 10.5 FL (ref 8.9–12.9)
POTASSIUM SERPL-SCNC: 3.8 MMOL/L (ref 3.5–5.1)
PROT SERPL-MCNC: 7.3 G/DL (ref 6.4–8.2)
RBC # BLD AUTO: 4.66 M/UL (ref 3.8–5.2)
SODIUM SERPL-SCNC: 140 MMOL/L (ref 136–145)
TRIGL SERPL-MCNC: 64 MG/DL
VLDLC SERPL CALC-MCNC: 12.8 MG/DL
WBC # BLD AUTO: 4.9 K/UL (ref 3.6–11)

## 2024-05-03 RX ORDER — HYDROCHLOROTHIAZIDE 25 MG/1
25 TABLET ORAL DAILY
Qty: 90 TABLET | Refills: 3 | Status: SHIPPED | OUTPATIENT
Start: 2024-05-03

## 2024-05-03 RX ORDER — DOXYCYCLINE HYCLATE 100 MG
100 TABLET ORAL 2 TIMES DAILY
Qty: 20 TABLET | Refills: 0 | Status: SHIPPED | OUTPATIENT
Start: 2024-05-03 | End: 2024-05-13

## 2024-05-03 RX ORDER — CLONAZEPAM 0.5 MG/1
0.5 TABLET ORAL NIGHTLY PRN
COMMUNITY
Start: 2024-03-01

## 2024-05-03 NOTE — PROGRESS NOTES
Chief Complaint   Patient presents with    Annual Exam       \"Have you been to the ER, urgent care clinic since your last visit?  Hospitalized since your last visit?\"    NO    “Have you seen or consulted any other health care providers outside of [facility] since your last visit?”    NO    Have you had a mammogram?”   YES - Where: [facility] Nurse/CMA to request most recent records if not in the chart    Date of last Mammogram: 2/2/2022      “Have you had a pap smear?”    YES - Where: [facility] Nurse/CMA to request most recent records if not in the chart    Date of last Cervical Cancer screen (HPV or PAP): 11/3/2020       Click Here for Release of Records Request          4/30/2024    11:03 AM   PHQ-9    Little interest or pleasure in doing things 1   Feeling down, depressed, or hopeless 1   Trouble falling or staying asleep, or sleeping too much 0   Feeling tired or having little energy 0   Poor appetite or overeating 0   Feeling bad about yourself - or that you are a failure or have let yourself or your family down 0   Trouble concentrating on things, such as reading the newspaper or watching television 0   Moving or speaking so slowly that other people could have noticed. Or the opposite - being so fidgety or restless that you have been moving around a lot more than usual 0   Thoughts that you would be better off dead, or of hurting yourself in some way 0   PHQ-2 Score 2   PHQ-9 Total Score 2   If you checked off any problems, how difficult have these problems made it for you to do your work, take care of things at home, or get along with other people? 0       Health Maintenance Due   Topic Date Due    Hepatitis B vaccine (1 of 3 - 3-dose series) Never done    HIV screen  Never done    Hepatitis C screen  Never done    Shingles vaccine (1 of 2) Never done    Breast cancer screen  02/02/2023    COVID-19 Vaccine (5 - 2023-24 season) 09/01/2023    Cervical cancer screen  11/03/2023

## 2024-05-03 NOTE — PROGRESS NOTES
[facility]  Clinic Note    Well Adult Note  Name: Ivis Wallace Today’s Date: 5/3/2024   MRN: [id number] Sex: Female   Age: 58 y.o. Ethnicity: Non- / Non    : 1965 Race: White (non-)      Ivis Wallace is here for well adult exam. Now seeing Dr. Álvaro Cramer for psych. Working w/ Mary Martinez for counseling services. Bitten by dog over the weekend. Received Tetanus vaccine & prescribed Augmentin. Did not  or start it due to concerns that it normally will cause significant GI upset.         Review of Systems   All other systems reviewed and are negative.      No Known Allergies      Prior to Visit Medications    Medication Sig Taking? Authorizing Provider   doxycycline hyclate (VIBRA-TABS) 100 MG tablet Take 1 tablet by mouth 2 times daily for 10 days Yes Gladys Arroyo APRN - NP   hydroCHLOROthiazide (HYDRODIURIL) 25 MG tablet Take 1 tablet by mouth daily for blood pressure Yes Gladys Arroyo APRN - NP   clonazePAM (KLONOPIN) 0.5 MG tablet Take 1 tablet by mouth nightly as needed. Max Daily Amount: 0.5 mg Yes ProviderUmair MD   ARIPiprazole (ABILIFY) 5 MG tablet Take 1 tablet by mouth daily Yes ProviderUmair MD   buPROPion (WELLBUTRIN XL) 300 MG extended release tablet TAKE ONE TABLET BY MOUTH EVERY MORNING Yes Gladys Arroyo APRN - NP   ALPRAZolam (XANAX) 0.5 MG tablet Take 1 tablet by mouth daily as needed. Yes Automatic Reconciliation, Ar   PARoxetine (PAXIL) 40 MG tablet Take 1.5 tablets by mouth daily Yes Automatic Reconciliation, Ar         Past Medical History:   Diagnosis Date    Anxiety     Depression     Hearing aid worn     Bilateral    Herpes     Hypertension        Past Surgical History:   Procedure Laterality Date    GI  2017    COLONOCSCOPY    ORTHOPEDIC SURGERY Right     R index finger         Family History   Problem Relation Age of Onset    Cancer Mother         hysterectomy    Osteoporosis Mother

## 2024-05-04 LAB — BACTERIA SPEC AEROBE CULT: NORMAL

## 2024-05-06 LAB
BACTERIA SPEC AEROBE CULT: NORMAL
BACTERIA SPEC ANAEROBE CULT: NORMAL

## 2024-05-09 ENCOUNTER — TELEMEDICINE (OUTPATIENT)
Age: 59
End: 2024-05-09
Payer: COMMERCIAL

## 2024-05-09 DIAGNOSIS — F33.1 MAJOR DEPRESSIVE DISORDER, RECURRENT, MODERATE (HCC): Primary | ICD-10-CM

## 2024-05-09 PROCEDURE — 90837 PSYTX W PT 60 MINUTES: CPT | Performed by: SOCIAL WORKER

## 2024-05-09 NOTE — PROGRESS NOTES
symptoms.  We have another appt scheduled next week.    We did set a follow-up appointment with this clinician.      Follow-up appt date (if applicable) is:  next week SOILA Wallace, was evaluated through a synchronous (real-time) audio-video encounter. The patient (or guardian if applicable) is aware that this is a billable service, which includes applicable co-pays. This Virtual Visit was conducted with patient's (and/or legal guardian's) consent. Patient identification was verified, and a caregiver was present when appropriate.   The patient was located at Home: [address] [place] [address] [place] [address]  Provider was located at Home (Appt Dept State[place]  Confirm you are appropriately licensed, registered, or certified to deliver care in the state where the patient is located as indicated above. If you are not or unsure, please re-schedule the visit: Yes, I confirm.        Total time spent for this encounter:  57 minutes    --Mary Martinez LCSW on 5/9/2024 at 3:02 PM    An electronic signature was used to authenticate this note.

## 2024-05-16 ENCOUNTER — TELEMEDICINE (OUTPATIENT)
Age: 59
End: 2024-05-16
Payer: COMMERCIAL

## 2024-05-16 DIAGNOSIS — F33.1 MAJOR DEPRESSIVE DISORDER, RECURRENT, MODERATE (HCC): Primary | ICD-10-CM

## 2024-05-16 PROCEDURE — 90834 PSYTX W PT 45 MINUTES: CPT | Performed by: SOCIAL WORKER

## 2024-05-16 NOTE — PROGRESS NOTES
Virtual Visit (At Home) -Follow Up    Time Start:11:02 am  Time End:11:45 am    DX:    Diagnosis Orders   1. Major depressive disorder, recurrent, moderate (HCC)             Met with Ms. Wallace today for our follow up appt. This patient attended a virtual appt and the emphasis was to consider taking a 5mg increase in her Abilify due to her increased depression.  She reports continued lack of motivation, inattentiveness and feeling like each day is like the last.  She is not expressing any acute symptoms, nor are any observed at this time.  Her plan at this time is to get in touch with her psychiatrist to discuss the pros and cons of increasing one of her psychiatric medications.  She does express relationship issues which is contributing to her depression.  She is contemplating dealing with that as well but admits to having a hard time confronting others.    We did set a follow-up appointment with this clinician.      Follow-up appt date (if applicable) is:  May 30 @ 11 SOILA Wallace, was evaluated through a synchronous (real-time) audio-video encounter. The patient (or guardian if applicable) is aware that this is a billable service, which includes applicable co-pays. This Virtual Visit was conducted with patient's (and/or legal guardian's) consent. Patient identification was verified, and a caregiver was present when appropriate.   The patient was located at Home: [address] [place] [address] [address] [place] [address]  Provider was located at Home [place]  Confirm you are appropriately licensed, registered, or certified to deliver care in the state where the patient is located as indicated above. If you are not or unsure, please re-schedule the visit: Yes, I confirm.        Total time spent for this encounter:  43 minutes    --Mary Martinez LCSW on 5/16/2024 at 11:46 AM    An electronic signature was used to authenticate this note.

## 2024-05-25 ENCOUNTER — OFFICE VISIT (OUTPATIENT)
Age: 59
End: 2024-05-25

## 2024-05-25 VITALS
BODY MASS INDEX: 27.38 KG/M2 | WEIGHT: 145 LBS | OXYGEN SATURATION: 98 % | SYSTOLIC BLOOD PRESSURE: 127 MMHG | RESPIRATION RATE: 18 BRPM | DIASTOLIC BLOOD PRESSURE: 73 MMHG | TEMPERATURE: 97.7 F | HEIGHT: 61 IN | HEART RATE: 71 BPM

## 2024-05-25 DIAGNOSIS — N30.01 ACUTE CYSTITIS WITH HEMATURIA: Primary | ICD-10-CM

## 2024-05-25 DIAGNOSIS — R30.9 PAINFUL URINATION: ICD-10-CM

## 2024-05-25 LAB
BILIRUBIN, URINE, POC: NEGATIVE
BLOOD URINE, POC: ABNORMAL
GLUCOSE URINE, POC: NEGATIVE
KETONES, URINE, POC: NEGATIVE
LEUKOCYTE ESTERASE, URINE, POC: ABNORMAL
NITRITE, URINE, POC: POSITIVE
PH, URINE, POC: 6 (ref 4.6–8)
PROTEIN,URINE, POC: 100
SPECIFIC GRAVITY, URINE, POC: 1.02 (ref 1–1.03)
URINALYSIS CLARITY, POC: ABNORMAL
URINALYSIS COLOR, POC: YELLOW
UROBILINOGEN, POC: ABNORMAL

## 2024-05-25 RX ORDER — SULFAMETHOXAZOLE AND TRIMETHOPRIM 800; 160 MG/1; MG/1
1 TABLET ORAL 2 TIMES DAILY
Qty: 14 TABLET | Refills: 0 | Status: SHIPPED | OUTPATIENT
Start: 2024-05-25 | End: 2024-06-01

## 2024-05-28 LAB
BACTERIA SPEC CULT: ABNORMAL
BACTERIA SPEC CULT: ABNORMAL
CC UR VC: ABNORMAL
SERVICE CMNT-IMP: ABNORMAL

## 2024-05-28 RX ORDER — NITROFURANTOIN 25; 75 MG/1; MG/1
100 CAPSULE ORAL 2 TIMES DAILY
Qty: 14 CAPSULE | Refills: 0 | Status: SHIPPED | OUTPATIENT
Start: 2024-05-28 | End: 2024-06-04

## 2024-05-30 ENCOUNTER — TELEMEDICINE (OUTPATIENT)
Age: 59
End: 2024-05-30
Payer: COMMERCIAL

## 2024-05-30 DIAGNOSIS — F33.1 MAJOR DEPRESSIVE DISORDER, RECURRENT, MODERATE (HCC): Primary | ICD-10-CM

## 2024-05-30 PROCEDURE — 90832 PSYTX W PT 30 MINUTES: CPT | Performed by: SOCIAL WORKER

## 2024-05-30 NOTE — PROGRESS NOTES
Virtual Progress Note    Session Time     Start Time:  11:00 am  Finish Time:  11:37 am    Total time spent for this encounter:  37 minutes    Therapy Modalities   Cognitive  and Supportive    Assessment / Plan     Assessment:  Improving     Plan:  continue psychotherapy    1. Major depressive disorder, recurrent, moderate (HCC)       Return in about 3 weeks (around 6/20/2024).     Ivis Wallace, was evaluated through a synchronous (real-time) audio-video encounter. The patient (or guardian if applicable) is aware that this is a billable service, which includes applicable co-pays. This Virtual Visit was conducted with patient's (and/or legal guardian's) consent. Patient identification was verified, and a caregiver was present when appropriate.   The patient was located at Home: [address] [place] [address] [address] [place] [address]  Provider was located at [place]  Confirm you are appropriately licensed, registered, or certified to deliver care in the state where the patient is located as indicated above. If you are not or unsure, please re-schedule the visit: Yes, I confirm.      Follow up appt:  June 20 @ 11 VV      --Mary Martinez LCSW on 5/30/2024 at 2:15 PM    An electronic signature was used to authenticate this note.       
Patient/Caregiver provided printed discharge information.

## 2024-06-20 ENCOUNTER — TELEMEDICINE (OUTPATIENT)
Age: 59
End: 2024-06-20
Payer: COMMERCIAL

## 2024-06-20 DIAGNOSIS — F33.1 MAJOR DEPRESSIVE DISORDER, RECURRENT, MODERATE (HCC): Primary | ICD-10-CM

## 2024-06-20 PROCEDURE — 90834 PSYTX W PT 45 MINUTES: CPT | Performed by: SOCIAL WORKER

## 2024-06-20 NOTE — PROGRESS NOTES
Session Time     Follow up: Virtual Billing    Start Time:    11:10 am  Finish Time:  11:56 am    Total time spent for this encounter:  46 minutes    We did set a follow-up appointment with this clinician.      Follow up:  July 18 @ 11 VV.    Therapy Modalities   Cognitive Behavioral and Homework / Exercises    Assessment / Plan     Ivis Wallace is a 59 y.o. female who is alert and oriented X3.  She does not have any suicidal or homicidal ideations.  Patient is not psychotic or delusional.   She has not been psychiatrically admitted to a hospital. Ms. Wallace does have good eye contact during this interview. She is  verbal and engaging.  Patient does have good insight and judgement.      Psychotherapy Target Symptoms:  anxiety, depressed mood, increased sleeping, and lack of motivation    Assessment:  No Progress    Plan:  continue psychotherapy    1. Major depressive disorder, recurrent, moderate (HCC)       Ivis Wallace, was evaluated through a synchronous (real-time) audio-video encounter. The patient (or guardian if applicable) is aware that this is a billable service, which includes applicable co-pays. This Virtual Visit was conducted with patient's (and/or legal guardian's) consent. Patient identification was verified, and a caregiver was present when appropriate.   The patient was located at Home: [address] [place] [address] [place] [address]  Provider was located at [place]  Confirm you are appropriately licensed, registered, or certified to deliver care in the state where the patient is located as indicated above. If you are not or unsure, please re-schedule the visit: Yes, I confirm.        --Mary Martinez LCSW on 6/20/2024 at 1:25 PM    An electronic signature was used to authenticate this note.

## 2024-06-20 NOTE — PSYCHOTHERAPY
Virtual Visit (At Home) -Follow Up    DX:    Diagnosis Orders   1. Major depressive disorder, recurrent, moderate (HCC)             Met with Ms. Wallace today for our follow up appt. This patient reports she has been taking 1 pill in the am and 1 pill in the evening of her clonazepam.  Her psych NP informed her not to take two pills at one time as it is too much at once.  Also, this medication is a PRN so she is working on taking one at night and unless she has problems in the am, she will not take that pill.  She reports she continues to have lack of motivation in the evenings and on weekends.  However, part of that lack of motivation is that her partner is lethargic and doesn't assist and participate in any of the chores around the house.  Therefore, she doesn't have anyone to motivate her to get things done.  Ms. Wallace was initially saying that she had a hard time motivating herself to go to work but then as the workload is much less after the April 15 tax deadline, she doesn't have to work as hard.  She admits that she feels awkward not coming in each day and working but then once she gets there, she feels unmotivated.  She knows, however, that if she stayed at home, she would not do anything.   We talked about having a small \"to do\" list and trying to get at least one small chore completed each day.  She will go to the gym as well as play soccer on the weekends with a league so she has some internal push to do things.  The patient will work on this homework assignment until our next follow up appt.      We did set a follow-up appointment with this clinician.      Follow-up appt date (if applicable) is:  July 18 @ 11 VV.    --Mary Martinez LCSW on 6/20/2024 at 1:32 PM    An electronic signature was used to authenticate this note.

## 2024-06-29 ENCOUNTER — OFFICE VISIT (OUTPATIENT)
Age: 59
End: 2024-06-29

## 2024-06-29 VITALS
DIASTOLIC BLOOD PRESSURE: 73 MMHG | OXYGEN SATURATION: 97 % | TEMPERATURE: 98.4 F | HEIGHT: 61 IN | HEART RATE: 64 BPM | RESPIRATION RATE: 16 BRPM | SYSTOLIC BLOOD PRESSURE: 116 MMHG | BODY MASS INDEX: 27.75 KG/M2 | WEIGHT: 147 LBS

## 2024-06-29 DIAGNOSIS — N30.01 ACUTE CYSTITIS WITH HEMATURIA: Primary | ICD-10-CM

## 2024-06-29 LAB
BILIRUBIN, URINE, POC: NEGATIVE
BLOOD URINE, POC: ABNORMAL
GLUCOSE URINE, POC: NEGATIVE
KETONES, URINE, POC: NEGATIVE
LEUKOCYTE ESTERASE, URINE, POC: ABNORMAL
NITRITE, URINE, POC: NEGATIVE
PH, URINE, POC: 6 (ref 4.6–8)
PROTEIN,URINE, POC: NEGATIVE
SPECIFIC GRAVITY, URINE, POC: 1.02 (ref 1–1.03)
URINALYSIS CLARITY, POC: ABNORMAL
URINALYSIS COLOR, POC: ABNORMAL
UROBILINOGEN, POC: ABNORMAL

## 2024-06-29 RX ORDER — NITROFURANTOIN 25; 75 MG/1; MG/1
100 CAPSULE ORAL 2 TIMES DAILY
Qty: 14 CAPSULE | Refills: 0 | Status: SHIPPED | OUTPATIENT
Start: 2024-06-29 | End: 2024-07-06

## 2024-06-29 ASSESSMENT — ENCOUNTER SYMPTOMS
VOMITING: 0
NAUSEA: 0

## 2024-06-29 NOTE — PATIENT INSTRUCTIONS
Thank you for visiting [facility] today.    -Increase fluid intake.  Some people say cranberry juice helps with discomfort.  -Don't hold your urine.  Urinate when you feel like you need to.  -Wipe from front to back after bowel movements.  -If sexually active, urinate after intercourse and use enough lubrication.   -Avoid taking bubble baths.  -Wear loose fitting clothing.  -Decrease caffeine or carbonated drinks  -Repeat urine screen in two weeks  -Take antibiotic with food and consider probiotic    Follow up with your PCP if symptoms persist or worsen.    Please go to the Emergency Department immediately for symptoms of a urinary tract infection along with any of the following:  fever with severe and sudden shaking (rigors), nausea, vomiting and the inability to keep down clear fluids or medications.

## 2024-06-29 NOTE — PROGRESS NOTES
Subjective     No chief complaint on file.        Urinary Tract Infection     59-year-old female presents for urinary frequency and urgency going on for the past week.  No fever chills nausea vomiting.  She has had UTIs in the past    Past Medical History:   Diagnosis Date    Anxiety     Depression     Hearing aid worn     Bilateral    Herpes 1990    Hypertension        Past Surgical History:   Procedure Laterality Date    GI  08/2017    COLONOCSCOPY    ORTHOPEDIC SURGERY Right 1/14    R index finger       Family History   Problem Relation Age of Onset    Cancer Mother         hysterectomy    Osteoporosis Mother     Broken Bones Mother     High Cholesterol Mother     Hypertension Mother     Diabetes Father         type 2    Hypertension Father     High Cholesterol Father     Diabetes Sister         type 2    Hypertension Sister     Cancer Sister         thyroid    Depression Brother        No Known Allergies    Social History     Tobacco Use    Smoking status: Never    Smokeless tobacco: Never   Substance Use Topics    Alcohol use: Yes    Drug use: No       There were no vitals filed for this visit.    Review of Systems   Constitutional:  Negative for chills and fever.   Gastrointestinal:  Negative for nausea and vomiting.   Genitourinary:  Positive for frequency and urgency.       Objective     Physical Exam  Vitals reviewed.   Constitutional:       Appearance: Normal appearance.   Cardiovascular:      Rate and Rhythm: Normal rate and regular rhythm.      Heart sounds: Normal heart sounds.   Pulmonary:      Effort: Pulmonary effort is normal.      Breath sounds: Normal breath sounds.   Abdominal:      General: Bowel sounds are normal.      Palpations: Abdomen is soft.      Tenderness: There is no abdominal tenderness. There is no right CVA tenderness or left CVA tenderness.   Neurological:      General: No focal deficit present.      Mental Status: She is alert and oriented to person, place, and time.

## 2024-07-18 ENCOUNTER — TELEMEDICINE (OUTPATIENT)
Age: 59
End: 2024-07-18
Payer: COMMERCIAL

## 2024-07-18 DIAGNOSIS — F33.1 MAJOR DEPRESSIVE DISORDER, RECURRENT, MODERATE (HCC): Primary | ICD-10-CM

## 2024-07-18 PROCEDURE — 90834 PSYTX W PT 45 MINUTES: CPT | Performed by: SOCIAL WORKER

## 2024-07-18 NOTE — PROGRESS NOTES
Follow Up Virtual Visit Billing    Session Time     Start Time:    11:00 am  Finish Time:  11:43 am    Total time spent for this encounter:  43 minutes    We did set a follow-up appointment with this clinician.      Follow up:  August 8 @ 11 VV    Therapy Modalities   Cognitive     Assessment / Plan     Psychotherapy Target Symptoms:  change in daily functioning , depressed mood, and feelings of helplessness /  hopefulness    Assessment:  No Progress    Plan:  continue psychotherapy    1. Major depressive disorder, recurrent, moderate (HCC)       Ivis Wallace, was evaluated through a synchronous (real-time) audio-video encounter. The patient (or guardian if applicable) is aware that this is a billable service, which includes applicable co-pays. This Virtual Visit was conducted with patient's (and/or legal guardian's) consent. Patient identification was verified, and a caregiver was present when appropriate.   The patient was located at Home: [address] [place] [address] [place] [address]  Provider was located at [place]  Confirm you are appropriately licensed, registered, or certified to deliver care in the state where the patient is located as indicated above. If you are not or unsure, please re-schedule the visit: Yes, I confirm.        --Mary Martinez LCSW on 7/18/2024 at 1:38 PM    An electronic signature was used to authenticate this note.

## 2024-07-18 NOTE — PSYCHOTHERAPY
Virtual Visit (At Home) -Follow Up    DX:    Diagnosis Orders   1. Major depressive disorder, recurrent, moderate (HCC)             Met with Ms. Wallace today for our follow up appt. This patient reports she does not feel any better since our last appt.  She feels like everyday is like the last.  She is unmotivated, lethargic, hopeless re: feeling less depressed and frustrated.  She does state that she saw her psych NP and he has put her on a completely different medication other than Abilify.  She has now started (as of last night) Vraylar, 1.5 mg. And is hoping that will make a difference.  She will see her psych NP again in 6 weeks.  She does want to get away but feels hesitant to go alone.  She has never taken a trip without either her mother or her boyfriend.  Her mother is now declining going out of town due to some physical restraints and her boyfriend only wants to go somewhere where you fly to, ie [place].  The patient is very fearful of flying so she does not see these type of places as options.  He doesn't want to go to any beaches nearby where you can drive.  We discussed her exploring some independent options for herself to a beach as she enjoys the water and the environment so much.  This patient is not reporting any acute symptoms.    We did set a follow-up appointment with this clinician.      Follow-up appt date (if applicable) is:  August 8 @ 11 VV    --Mary Martinez LCSW on 7/18/2024 at 1:42 PM    An electronic signature was used to authenticate this note.

## 2024-08-08 ENCOUNTER — TELEMEDICINE (OUTPATIENT)
Age: 59
End: 2024-08-08
Payer: COMMERCIAL

## 2024-08-08 DIAGNOSIS — F33.1 MAJOR DEPRESSIVE DISORDER, RECURRENT, MODERATE (HCC): Primary | ICD-10-CM

## 2024-08-08 PROCEDURE — 90837 PSYTX W PT 60 MINUTES: CPT | Performed by: SOCIAL WORKER

## 2024-08-08 NOTE — PROGRESS NOTES
Follow Up Virtual Visit Billing    Session Time     Start Time:    11:00 am  Finish Time:  11:55 am    Total time spent for this encounter:  55 minutes    We did set a follow-up appointment with this clinician.    Follow up:  August 22 @ 11 VV       Therapy Modalities   Building Self Esteem, Building Insight, Client Empowerment, and Homework / Exercises    Assessment / Plan     Psychotherapy Target Symptoms:  change in daily functioning , decreased ADLs , depressed mood, feelings of helplessness /  hopefulness, and increased sleeping    Assessment:  Deterioration:  pt is reporting feeling lack of motivation, focus and concentration.  She is not finding any jose l in her daily life.  Her life currently consists of working, taking care of her mother and going home where she will watch television until bedtime.  She has a partner but he currently is going thru depression as well.  We discussed looking at changes, outside of medication, to liven up her daily routine.  We also discussed more intensive therapy such as IOP, if needed.    Plan:  continue psychotherapy    1. Major depressive disorder, recurrent, moderate (HCC)       Ivis Wallace, was evaluated through a synchronous (real-time) audio-video encounter. The patient (or guardian if applicable) is aware that this is a billable service, which includes applicable co-pays. This Virtual Visit was conducted with patient's (and/or legal guardian's) consent. Patient identification was verified, and a caregiver was present when appropriate.   The patient was located at Home: [address] [place] [address] [place] [address]  Provider was located at Home [place]  Confirm you are appropriately licensed, registered, or certified to deliver care in the state where the patient is located as indicated above. If you are not or unsure, please re-schedule the visit: Yes, I confirm.        --Mary Martinez LCSW on 8/8/2024 at 12:00 PM    An electronic signature was used

## 2024-08-08 NOTE — PSYCHOTHERAPY
VV    --Mary Martinez LCSW on 8/8/2024 at 12:08 PM    An electronic signature was used to authenticate this note.

## 2024-08-15 ENCOUNTER — TELEMEDICINE (OUTPATIENT)
Age: 59
End: 2024-08-15
Payer: COMMERCIAL

## 2024-08-15 DIAGNOSIS — F33.1 MAJOR DEPRESSIVE DISORDER, RECURRENT, MODERATE (HCC): Primary | ICD-10-CM

## 2024-08-15 PROCEDURE — 90832 PSYTX W PT 30 MINUTES: CPT | Performed by: SOCIAL WORKER

## 2024-08-15 NOTE — PSYCHOTHERAPY
Virtual Visit (At Home) -Follow Up    DX:    Diagnosis Orders   1. Major depressive disorder, recurrent, moderate (HCC)             Met with Ms. Wallace today for our follow up appt.  Deterioration:  this patient is reporting no improvement since our last appt. She continues to feel depressed, not suicidal, but continued lethargy, lack of interest in activities/hobbies and medications do not seem effective.  We discussed, and this therapist recommended IOP and provided the phone number of the program.  The patient agreed to contact that resource and would follow thru with an intake appt.  Additionally, she has an appt with her psychiatrist next Tuesday.  Patient is not reporting any acute symptoms but more intensive therapy is recommended at this time.       We did not set a follow-up appointment with this clinician.      Follow-up appt date (if applicable) is:  already have a follow up scheduled.    --Mary Martinez LCSW on 8/15/2024 at 11:58 AM    An electronic signature was used to authenticate this note.

## 2024-08-15 NOTE — PROGRESS NOTES
Follow Up Virtual Visit Billing    Session Time     Start Time:    11:00 am   Finish Time:  11:19 am    Total time spent for this encounter:  19 minutes    We did not set a follow-up appointment with this clinician.      Follow up:  we already have a follow up from our last appt scheduled for the future    Therapy Modalities   Building Insight and recommending additional, more intensive services    Assessment / Plan     Psychotherapy Target Symptoms:  change in daily functioning , decreased ADLs , depressed mood, and feelings of helplessness /  hopefulness    Assessment:  Deterioration:  this patient is reporting no improvement since our last appt. She continues to feel depressed, not suicidal, but continued lethargy, lack of interest in activities/hobbies and medications do not seem effective.  We discussed, and this therapist recommended IOP and provided the phone number of the program.  The patient agreed to contact that resource and would follow thru with an intake appt.  Additionally, she has an appt with her psychiatrist next Tuesday.  Patient is not reporting any acute symptoms but more intensive therapy is recommended at this time.    Plan:  continue psychotherapy, referrals as per plan, and other resources    1. Major depressive disorder, recurrent, moderate (HCC)       Ivis Wallace, was evaluated through a synchronous (real-time) audio-video encounter. The patient (or guardian if applicable) is aware that this is a billable service, which includes applicable co-pays. This Virtual Visit was conducted with patient's (and/or legal guardian's) consent. Patient identification was verified, and a caregiver was present when appropriate.   The patient was located at Home: [address] [place] [address] [place] [address]  Provider was located at Home [place]  Confirm you are appropriately licensed, registered, or certified to deliver care in the state where the patient is located as indicated above.

## 2024-08-22 ENCOUNTER — TELEMEDICINE (OUTPATIENT)
Age: 59
End: 2024-08-22
Payer: COMMERCIAL

## 2024-08-22 DIAGNOSIS — F33.1 MAJOR DEPRESSIVE DISORDER, RECURRENT, MODERATE (HCC): Primary | ICD-10-CM

## 2024-08-22 PROCEDURE — 90834 PSYTX W PT 45 MINUTES: CPT | Performed by: SOCIAL WORKER

## 2024-08-22 NOTE — PROGRESS NOTES
[place]  Confirm you are appropriately [place], registered, or certified to deliver care in the state where the patient is located as indicated above. If you are not or unsure, please re-schedule the visit: Yes, I confirm.        --Mary Martinez LCSW on 8/22/2024 at 2:12 PM    An electronic signature was used to authenticate this note.

## 2024-09-12 ENCOUNTER — TELEMEDICINE (OUTPATIENT)
Age: 59
End: 2024-09-12
Payer: COMMERCIAL

## 2024-09-12 DIAGNOSIS — F33.1 MAJOR DEPRESSIVE DISORDER, RECURRENT, MODERATE (HCC): Primary | ICD-10-CM

## 2024-09-12 PROCEDURE — 90834 PSYTX W PT 45 MINUTES: CPT | Performed by: SOCIAL WORKER

## 2024-10-10 ENCOUNTER — TELEMEDICINE (OUTPATIENT)
Age: 59
End: 2024-10-10
Payer: COMMERCIAL

## 2024-10-10 DIAGNOSIS — F33.1 MAJOR DEPRESSIVE DISORDER, RECURRENT, MODERATE (HCC): Primary | ICD-10-CM

## 2024-10-10 PROCEDURE — 90834 PSYTX W PT 45 MINUTES: CPT | Performed by: SOCIAL WORKER

## 2024-10-10 NOTE — PSYCHOTHERAPY
Virtual Visit (At Home) -Follow Up    DX:    Diagnosis Orders   1. Major depressive disorder, recurrent, moderate (HCC)             Met with Ms. Wallace today for our follow up appt. minimal improvement-this patient is continuing to attend IOP and reports she has been extended for possibly the next 3 weeks.  Although she has attended every meeting except one, she is unsure how much benefit this program is for her.  She meets on M,W,T and sees the psychiatrist every Monday.  The psychiatrist is exploring some other medications outside of some of the more traditional anti-depressants.  The patient is unsure of other methods.  She is also continuing to see her outpatient psychiatrist who is working on taking her off Paxil altogether and increasing the Zoloft.  The patient is not feeling much different in her mood, motivation, energy level, etc.  She did report that she got \"out of my comfort zone\" and initiated an activity with a co-worker.  She also is considering reaching out to one of her group members thru [facility] that will be positive for both of them as they will offer support to each other. As it is, Ms. Wallace feels very little support.  She cares for her mother and her boyfriend relies on her for more support than he is able to provide for her.  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  The patient is not reporting any acute symptoms but is followed closely thru her IOP at this time.      We did set a follow-up appointment with this clinician.      Follow-up appt date (if applicable) is:  October 24 @ 11 VV    --Mary Martinez LCSW on 10/10/2024 at 1:38 PM    An electronic signature was used to authenticate this note.

## 2024-10-10 NOTE — PROGRESS NOTES
Follow Up Virtual Visit Billing    Session Time     Start Time:    11:05 am  Finish Time:  11:52 am    Total time spent for this encounter:  47 minutes    We did set a follow-up appointment with this clinician.      Return in about 2 weeks (around 10/24/2024).     Therapy Modalities   Building Self Esteem, Building Insight, Cognitive Behavioral, Client Empowerment, Homework / Exercises, and Mindfulness    Assessment / Plan     Psychotherapy Target Symptoms:  decreased ADLs , depressed mood, feelings of helplessness /  hopefulness, increased sleeping, and isolation    Assessment:   minimal improvement -this patient is continuing to attend [facility] and reports she has been extended for possibly the next 3 weeks.  Although she has attended every meeting except one, she is unsure how much benefit this program is for her.  She meets on M,W,T and sees the psychiatrist every Monday.  The psychiatrist is exploring some other medications outside of some of the more traditional anti-depressants.  The patient is unsure of other methods.  She is also continuing to see her outpatient psychiatrist who is working on taking her off Paxil altogether and increasing the Zoloft.  The patient is not feeling much different in her mood, motivation, energy level, etc.  She did report that she got \"out of my comfort zone\" and initiated an activity with a co-worker.  She also is considering reaching out to one of her group members thru [facility] that will be positive for both of them as they will offer support to each other. As it is, Ms. Wallace feels very little support.  She cares for her mother and her boyfriend relies on her for more support than he is able to provide for her.  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  The patient is not reporting any acute symptoms but is followed closely thru her IOP at this time.      Plan:  continue psychotherapy    1. Major depressive

## 2024-10-24 ENCOUNTER — TELEMEDICINE (OUTPATIENT)
Age: 59
End: 2024-10-24
Payer: COMMERCIAL

## 2024-10-24 DIAGNOSIS — F33.1 MAJOR DEPRESSIVE DISORDER, RECURRENT, MODERATE (HCC): Primary | ICD-10-CM

## 2024-10-24 PROCEDURE — 90834 PSYTX W PT 45 MINUTES: CPT | Performed by: SOCIAL WORKER

## 2024-10-24 NOTE — PROGRESS NOTES
Follow Up Virtual Visit Billing    Session Time     Start Time:    11:13 am  Finish Time:  11:51 am    Total time spent for this encounter:  38 minutes    We did set a follow-up appointment with this clinician.      Return in about 1 week (around 10/31/2024).     Therapy Modalities   Building Insight, Cognitive , Client Empowerment, and medication compliance    Assessment / Plan     Psychotherapy Target Symptoms:  decreased ADLs , depressed mood, and medication changes    Assessment:   slight improvement   This patient \"graduates\" from her IOP today.  She isn't sure how much the program helped but then she also stated she was going thru medication changes during the entire program.  She did identify a few things that were helpful but she is not identifying a marked change in her mood or motivation.  She recently met with her psychiatrist who has increased her Zoloft and decreased her Paxil.  The plan is to take her completely off the Paxil and increase the Zoloft to 150 mg.  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  She would like to touch base weekly to keep the patient accountable and motivated.  No acute symptoms reported.    Plan:  continue psychotherapy    1. Major depressive disorder, recurrent, moderate (HCC)       Ivis Wallace, was evaluated through a synchronous (real-time) audio-video encounter. The patient (or guardian if applicable) is aware that this is a billable service, which includes applicable co-pays. This Virtual Visit was conducted with patient's (and/or legal guardian's) consent. Patient identification was verified, and a caregiver was present when appropriate.   The patient was located at Home: [address] [place] [address] [address] [place] [address]  Provider was located at [place]  Confirm you are appropriately licensed, registered,

## 2024-10-24 NOTE — PSYCHOTHERAPY
Virtual Visit (At Home) -Follow Up    DX:    Diagnosis Orders   1. Major depressive disorder, recurrent, moderate (HCC)             Met with Ms. Wallace today for our follow up appt. slight improvement  This patient \"graduates\" from her IOP today.  She isn't sure how much the program helped but then she also stated she was going thru medication changes during the entire program.  She did identify a few things that were helpful but she is not identifying a marked change in her mood or motivation.  She recently met with her psychiatrist who has increased her Zoloft and decreased her Paxil.  The plan is to take her completely off the Paxil and increase the Zoloft to 150 mg.  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  She would like to touch base weekly to keep the patient accountable and motivated.  No acute symptoms reported.    We did set a follow-up appointment with this clinician.      Follow-up appt date (if applicable) is:  October 31, Nov. 7 and Nov. 14.    --Mary Martinez LCSW on 10/24/2024 at 4:50 PM    An electronic signature was used to authenticate this note.

## 2024-11-07 ENCOUNTER — TELEMEDICINE (OUTPATIENT)
Age: 59
End: 2024-11-07
Payer: COMMERCIAL

## 2024-11-07 DIAGNOSIS — F33.1 MAJOR DEPRESSIVE DISORDER, RECURRENT, MODERATE (HCC): Primary | ICD-10-CM

## 2024-11-07 PROCEDURE — 90832 PSYTX W PT 30 MINUTES: CPT | Performed by: SOCIAL WORKER

## 2024-11-07 NOTE — PROGRESS NOTES
Follow Up Virtual Visit Billing    Session Time     Start Time:    11:07 am  Finish Time:  11:25 am    Total time spent for this encounter:  18 minutes    We did set a follow-up appointment with this clinician.      Follow up:  November 14 @ 11 VV    Therapy Modalities   Cognitive , Client Empowerment, Stress Management, and medication changes    Assessment / Plan     Psychotherapy Target Symptoms:  appetite decreased and depressed mood    Assessment:  No Progress  this patient reports she was woken by her mother knocking on her door at 4:00 am.  Her mother had gotten confused about the time.  Her mother had gotten up and gone to the gym and when the gym wasn't open, she went to the patient's house.  This behavior of confusion with her mother has never happened and it concerned Ms. Wallace.  Additionally, her mother did not appear \"out of it\" or confused after that episode.  As Ms. Wallace is the primary support for her mother, she was at her mother's home to make sure she was okay during our appt.  For that reason the appt was brief.  Ms. Wallace reports she has just increased her Zoloft this past Sunday to 150 mg.  Except for a decrease in appetite (which has been occurring for several weeks), she has not noticed any positive change.  We will follow up next week.  The patient does not report any acute symptoms at this time.      Plan:  continue psychotherapy    1. Major depressive disorder, recurrent, moderate (HCC)       Ivis Wallace, was evaluated through a synchronous (real-time) audio-video encounter. The patient (or guardian if applicable) is aware that this is a billable service, which includes applicable co-pays. This Virtual Visit was conducted with patient's (and/or legal guardian's) consent. Patient identification was verified, and a caregiver was present when appropriate.   The patient was located at Home: [address] [place] [address] [address] [place] [address]  Provider was located at Home (Appt

## 2024-11-07 NOTE — PSYCHOTHERAPY
Virtual Visit (At Home) -Follow Up    DX:    Diagnosis Orders   1. Major depressive disorder, recurrent, moderate (HCC)             Met with Ms. Wallace today for our follow up appt.  No Progress  this patient reports she was woken by her mother knocking on her door at 4:00 am.  Her mother had gotten confused about the time.  Her mother had gotten up and gone to the gym and when the gym wasn't open, she went to the patient's house.  This behavior of confusion with her mother has never happened and it concerned Ms. Wallace.  Additionally, her mother did not appear \"out of it\" or confused after that episode.  As Ms. Wallace is the primary support for her mother, she was at her mother's home to make sure she was okay during our appt.  For that reason the appt was brief.  Ms. Wallace reports she has just increased her Zoloft this past Sunday to 150 mg.  Except for a decrease in appetite (which has been occurring for several weeks), she has not noticed any positive change.  We will follow up next week.  The patient does not report any acute symptoms at this time.      We did set a follow-up appointment with this clinician.      Follow-up appt date (if applicable) is:  November 14 @ 11 VV    --Mary Martinez LCSW on 11/7/2024 at 11:52 AM    An electronic signature was used to authenticate this note.

## 2024-11-14 ENCOUNTER — TELEMEDICINE (OUTPATIENT)
Age: 59
End: 2024-11-14
Payer: COMMERCIAL

## 2024-11-14 DIAGNOSIS — F33.1 MAJOR DEPRESSIVE DISORDER, RECURRENT, MODERATE (HCC): Primary | ICD-10-CM

## 2024-11-14 PROCEDURE — 90834 PSYTX W PT 45 MINUTES: CPT | Performed by: SOCIAL WORKER

## 2024-11-14 NOTE — PSYCHOTHERAPY
Virtual Visit (At Home) -Follow Up    DX:    Diagnosis Orders   1. Major depressive disorder, recurrent, moderate (HCC)             Met with Ms. Wallace today for our follow up appt. No Progress-this patient has discussed her continued lethargy, lack of motivation, etc with her psychiatrist and is now going to return to her \"old\" prescription of Paxil.  She is again weaning off one medication and adding another which causes some mood changes.  She has reported an increase in anxiety as well as feeling overwhelmed with decisions she has to make at work.  We talked about breathing exercises and a guided imagery exercise.  The patient would like to keep weekly appts during this difficult time.  Additionally, she is still reporting decrease in appetite, lack of interest in food and a decrease in her exercise regime.  No acute symptoms are reported as this therapist questioned her thoughts or plans re: self harm.  Pt. Denied.       We did set a follow-up appointment with this clinician.      Follow-up appt date (if applicable) is:  Nov. 19 and Nov. 26-both VV at 3:00, Dec. 5 @ 11-VV and Dec. 16 @ 11 in office.    --Mary Martinez LCSW on 11/14/2024 at 12:19 PM    An electronic signature was used to authenticate this note.

## 2024-11-14 NOTE — PROGRESS NOTES
Follow Up Virtual Visit Billing    Session Time     Start Time:    1:08 am  Finish Time:  11:50 am    Total time spent for this encounter:  42 minutes    We did set a follow-up appointment with this clinician.      Return in about 1 week (around 11/21/2024).     Therapy Modalities   Building Self Esteem, Cognitive Behavioral, Client Empowerment, Guided Imagery, and Stress Management    Assessment / Plan     Psychotherapy Target Symptoms:  appetite decreased, change in daily functioning , decreased ADLs , depressed mood, feelings of helplessness /  hopefulness, and motivation and focus    Assessment:  No Progress-this patient has discussed her continued lethargy, lack of motivation, etc with her psychiatrist and is now going to return to her \"old\" prescription of Paxil.  She is again weaning off one medication and adding another which causes some mood changes.  She has reported an increase in anxiety as well as feeling overwhelmed with decisions she has to make at work.  We talked about breathing exercises and a guided imagery exercise.  The patient would like to keep weekly appts during this difficult time.  Additionally, she is still reporting decrease in appetite, lack of interest in food and a decrease in her exercise regime.  No acute symptoms are reported as this therapist questioned her thoughts or plans re: self harm.  Pt. Denied.    Plan:  continue psychotherapy    1. Major depressive disorder, recurrent, moderate (HCC)       Ivis Wallace, was evaluated through a synchronous (real-time) audio-video encounter. The patient (or guardian if applicable) is aware that this is a billable service, which includes applicable co-pays. This Virtual Visit was conducted with patient's (and/or legal guardian's) consent. Patient identification was verified, and a caregiver was present when appropriate.   The patient was located at Home: [address] [place] [address] [address] [place] [address]  Provider was located at [place] ([facility]

## 2024-11-26 ENCOUNTER — TELEMEDICINE (OUTPATIENT)
Age: 59
End: 2024-11-26
Payer: COMMERCIAL

## 2024-11-26 DIAGNOSIS — F33.1 MAJOR DEPRESSIVE DISORDER, RECURRENT, MODERATE (HCC): Primary | ICD-10-CM

## 2024-11-26 PROCEDURE — 90832 PSYTX W PT 30 MINUTES: CPT | Performed by: SOCIAL WORKER

## 2024-11-26 NOTE — PSYCHOTHERAPY
Virtual Visit (At Home) -Follow Up    DX:    Diagnosis Orders   1. Major depressive disorder, recurrent, moderate (HCC)             Met with Ms. Wallace today for our follow up appt. Deterioration:  This patient is very frustrated and upset today.  She reports feeling hopeless and helpless about her situation and her lack of progress re: her depression.  Ms. Wallace has had numerous changes in her medications in the past and at this point, her psychiatrist is putting her back of her Paxil, which worked in the past.  However, as the psychiatrist has had to wean her off other drugs in order to reintroduce the Paxil, the dosage has been smaller and is taking more time for her to feel better.  She is currently reporting heightened anxiety about her work and other activities that normally cause her no stress as well as her inability to focus and concentrate on her work.  She has had to rely on some of her other staff to take on more of her work since she can't seem to get things accomplished.  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  She was somewhat reluctant because she is worried that she will be unmotivated at home and \"just sit around.\"  As she does not feel like she is doing much at work currently, this therapist suggested she create a daily routine but without the pressure of her job.  The patient is considering this option.  No acute symptoms were reported.  The patient will continue to be seen weekly at this time to monitor her moods.    We did set a follow-up appointment with this clinician.      Follow-up appt date (if applicable) is:  December 5 VV    --Mary Martinez LCSW on 11/26/2024 at 4:08 PM    An electronic signature was used to authenticate this note.

## 2024-12-05 ENCOUNTER — TELEMEDICINE (OUTPATIENT)
Age: 59
End: 2024-12-05
Payer: COMMERCIAL

## 2024-12-05 DIAGNOSIS — F33.1 MAJOR DEPRESSIVE DISORDER, RECURRENT, MODERATE (HCC): Primary | ICD-10-CM

## 2024-12-05 PROCEDURE — 90834 PSYTX W PT 45 MINUTES: CPT | Performed by: SOCIAL WORKER

## 2024-12-06 NOTE — PSYCHOTHERAPY
Virtual Visit (At Home) -Follow Up    DX:    Diagnosis Orders   1. Major depressive disorder, recurrent, moderate (HCC)             Met with Ms. Wallace today for our follow up appt. minimal improvement-This patient reports her mother had a fall over the holiday and she was very worried about her.  She went to the hospital with her and stayed overnight at her house to make sure she was okay.  We discussed possibly getting her an alarm she could wear to alert the patient as well as a monitoring system should she need medical care.  She slipped in the bathroom and perhaps was on the floor for about an hour before the patient got there.  Ms. Wallace, although she has a brother, is really the only person that is physically here to care for her mom.  They are very close and it is upsetting to the patient that her mother isn't as spry as she used to be.  The patient states she may be feeling a \"little bit better\" but very minimally.  She continues to worry about her energy level and motivation.  NO acute symptoms were reported or observed.    We did set a follow-up appointment with this clinician.      Follow-up appt date (if applicable) is:  December 12 VV    --Mary Martinez LCSW on 12/6/2024 at 11:23 AM    An electronic signature was used to authenticate this note.

## 2024-12-06 NOTE — PROGRESS NOTES
Follow Up Virtual Visit Billing    Session Time     Start Time:    11:10 am  Finish Time:  11:53 am    Total time spent for this encounter:  40 minutes    We did set a follow-up appointment with this clinician.      Follow up:  Dec. 12 VV    Therapy Modalities   Building Self Esteem, Cognitive , Client Empowerment, and Mindfulness    Assessment / Plan     Psychotherapy Target Symptoms:  anxiety and worry, depression    Assessment:   minimal improvement -This patient reports her mother had a fall over the holiday and she was very worried about her.  She went to the hospital with her and stayed overnight at her house to make sure she was okay.  We discussed possibly getting her an alarm she could wear to alert the patient as well as a monitoring system should she need medical care.  She slipped in the bathroom and perhaps was on the floor for about an hour before the patient got there.  Ms. Wallace, although she has a brother, is really the only person that is physically here to care for her mom.  They are very close and it is upsetting to the patient that her mother isn't as spry as she used to be.  The patient states she may be feeling a \"little bit better\" but very minimally.  She continues to worry about her energy level and motivation.  NO acute symptoms were reported or observed.    Plan:  continue psychotherapy    1. Major depressive disorder, recurrent, moderate (HCC)       Ivis Wallace, was evaluated through a synchronous (real-time) audio-video encounter. The patient (or guardian if applicable) is aware that this is a billable service, which includes applicable co-pays. This Virtual Visit was conducted with patient's (and/or legal guardian's) consent. Patient identification was verified, and a caregiver was present when appropriate.   The patient was located at Home: [address] [place] [address] [place] [address]  Provider was located at Home ([place]): [place]  Confirm you are appropriately

## 2024-12-10 ENCOUNTER — TELEMEDICINE (OUTPATIENT)
Age: 59
End: 2024-12-10
Payer: COMMERCIAL

## 2024-12-10 DIAGNOSIS — F33.1 MAJOR DEPRESSIVE DISORDER, RECURRENT, MODERATE (HCC): Primary | ICD-10-CM

## 2024-12-10 PROCEDURE — 90832 PSYTX W PT 30 MINUTES: CPT | Performed by: SOCIAL WORKER

## 2024-12-10 NOTE — PROGRESS NOTES
Follow Up Virtual Visit Billing    Session Time     Start Time:    10:00 am  Finish Time:  1029 am    Total time spent for this encounter:  29 minutes    We did set a follow-up appointment with this clinician.      Return in about 1 week (around 12/17/2024).     Therapy Modalities   Cognitive , Client Empowerment, Mindfulness, Stress Management, and motivational.    Assessment / Plan     Psychotherapy Target Symptoms:  anxiety, appetite increased, change in daily functioning , decreased ADLs , depressed mood, and feelings of helplessness /  hopefulness    Assessment:  No Progress-Ms. Wallace is reporting no real change in her depressive symptoms.  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  She states her mother has not had any other falls since last week so she is grateful for that.  Her brother is planning a trip to [employer] [place] before the New Year which will be nice for their mother.  Ms. Wallace does not see a change in her depression but also stated she just started one of the medications last Saturday that will hopefully help her symptoms.  Ms. Wallace reports she is not planning on doing anything for [employer] this year, but her boyfriend has put up a [employer] tree.  We discussed her weight loss (22 lbs total) as maybe more than just her depression.  As she has not had a physical in a year, she will go ahead and schedule that with her PCP.  The patient has scheduled an appt in the office this upcoming Monday.    Plan:  continue psychotherapy    1. Major depressive disorder, recurrent, moderate (HCC)       Ivis Wallace, was evaluated through a synchronous (real-time) audio-video encounter. The patient (or guardian if applicable) is aware that this is a billable service, which includes applicable co-pays. This Virtual Visit was conducted with patient's (and/or legal guardian's) consent. Patient identification was verified, and a caregiver was

## 2024-12-10 NOTE — PSYCHOTHERAPY
Virtual Visit (At Home) -Follow Up    DX:    Diagnosis Orders   1. Major depressive disorder, recurrent, moderate (HCC)             Met with Ms. Wallace today for our follow up appt. No Progress-Ms. Wallace is reporting no real change in her depressive symptoms.  She did go to the [employer] for [place] to participate in her duties thru the [employer] committee.  She states her mother has not had any other falls since last week so she is grateful for that.  Her brother is planning a trip to [place] before the New Year which will be nice for their mother.  Ms. Wallace does not see a change in her depression but also stated she just started one of the medications last Saturday that will hopefully help her symptoms.  Ms. Wallace reports she is not planning on doing anything for [email or web address] this year, but her boyfriend has put up a [employer] tree.  We discussed her weight loss (22 lbs total) as maybe more than just her depression.  As she has not had a physical in a year, she will go ahead and schedule that with her PCP.  The patient has scheduled an appt in the office this upcoming Monday.    We did set a follow-up appointment with this clinician.      Follow-up appt date (if applicable) is:  Dec. 16 in office.    --Mary Martinez LCSW on 12/10/2024 at 10:56 AM    An electronic signature was used to authenticate this note.

## 2025-01-09 ENCOUNTER — TELEMEDICINE (OUTPATIENT)
Age: 60
End: 2025-01-09
Payer: COMMERCIAL

## 2025-01-09 DIAGNOSIS — F33.1 MAJOR DEPRESSIVE DISORDER, RECURRENT, MODERATE (HCC): Primary | ICD-10-CM

## 2025-01-09 PROCEDURE — 90834 PSYTX W PT 45 MINUTES: CPT | Performed by: SOCIAL WORKER

## 2025-01-09 NOTE — PSYCHOTHERAPY
Virtual Visit (At Home) -Follow Up    DX:    Diagnosis Orders   1. Major depressive disorder, recurrent, moderate (HCC)             Met with Ms. Wallace today for our follow up appt. minimal progress-this patient did not report a particularly exciting holiday time, other than her brother and his family came for a short visit to see she and her mother.  Ms. Wallace does not report a particularly close relationship with her brother but was glad to see him and his adult children.  The patient is stating that she has felt a bit more focused and a bit more energy while working.  She does not have much energy while at home and doesn't report any events or activities that she and her partner are doing at this time.  Ms. 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  Due to the inclement weather, she was unable to talk with her psychiatrist about an increase in her medication.  She does not have an appt with him until the end of the month.  This therapist recommended she call and inquire about an increase (as he had planned to do) so she can continue to possibly improve her flat affect and sad mood.  No acute symptoms reported at this time.    We did set a follow-up appointment with this clinician.      Follow-up appt date (if applicable) is:  next week VV    --Mary Martinez LCSW on 1/9/2025 at 5:35 PM    An electronic signature was used to authenticate this note.

## 2025-01-09 NOTE — PROGRESS NOTES
Follow Up Virtual Visit Billing    Session Time     Start Time:    3:00 pm  Finish Time:  3:44 pm    Total time spent for this encounter:  44 minutes    We did set a follow-up appointment with this clinician.      Return in about 1 week (around 1/16/2025).     Therapy Modalities   Building Self Esteem, Building Insight, Cognitive Behavioral, Client Empowerment, and Stress Management    Assessment / Plan     Psychotherapy Target Symptoms:  appetite decreased, decreased ADLs , depressed mood, feelings of helplessness /  hopefulness, and relational    Assessment:   minimal progress -this patient did not report a particularly exciting holiday time, other than her brother and his family came for a short visit to see she and her mother.  Ms. Wallace does not report a particularly close relationship with her brother but was glad to see him and his adult children.  The patient is stating that she has felt a bit more focused and a bit more energy while working.  She does not have much energy while at home and doesn't report any events or activities that she and her partner are doing at this time.  Ms. 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  Due to the inclement weather, she was unable to talk with her psychiatrist about an increase in her medication.  She does not have an appt with him until the end of the month.  This therapist recommended she call and inquire about an increase (as he had planned to do) so she can continue to possibly improve her flat affect and sad mood.  No acute symptoms reported at this time.    Plan:  continue psychotherapy    1. Major depressive disorder, recurrent, moderate (HCC)       Ivis Wallace, was evaluated through a synchronous (real-time) audio-video encounter. The patient (or guardian if applicable) is aware that this is a billable service, which

## 2025-01-13 ASSESSMENT — ENCOUNTER SYMPTOMS: ABDOMINAL PAIN: 0

## 2025-01-16 ENCOUNTER — TELEMEDICINE (OUTPATIENT)
Age: 60
End: 2025-01-16
Payer: COMMERCIAL

## 2025-01-16 DIAGNOSIS — F33.1 MAJOR DEPRESSIVE DISORDER, RECURRENT, MODERATE (HCC): Primary | ICD-10-CM

## 2025-01-16 PROCEDURE — 90834 PSYTX W PT 45 MINUTES: CPT | Performed by: SOCIAL WORKER

## 2025-01-16 NOTE — PSYCHOTHERAPY
Virtual Visit (At Home) -Follow Up    DX:    Diagnosis Orders   1. Major depressive disorder, recurrent, moderate (HCC)             Met with Ms. Wallace today for our follow up appt. minimal progress-Pt continues to report problems with mood, motivation, energy and focus.  She has started taking her night time medication a bit earlier and finds that to assist in her getting up earlier to start her day, however, she still feels slow and groggy for a bit.  Pt. Is frustrated because she doesn't see her psychiatrist for another 2 weeks and she wanted to get the go ahead to increase one of her medications.  She continues to spend time with her mother.  Work is busy and home life remains the same.  NO acute symptoms reported.      We did set a follow-up appointment with this clinician.      Follow-up appt date (if applicable) is:  January 30 @ 11 VV    --Mary Martinez LCSW on 1/16/2025 at 3:05 PM    An electronic signature was used to authenticate this note.

## 2025-01-16 NOTE — PROGRESS NOTES
Follow Up Virtual Visit Billing    Session Time     Start Time:    2:07 pm-had trouble earlier connecting.  Finish Time:  2:56 pm    Total time spent for this encounter:  49 minutes    We did set a follow-up appointment with this clinician.      Return in about 2 weeks (around 1/30/2025).     Therapy Modalities   Cognitive , Client Empowerment, Stress Management, and medication issues pertaining to mood and emotional stability.    Assessment / Plan     Psychotherapy Target Symptoms:  anxiety, appetite increased, change in daily functioning , depressed mood, poor sleep, and medication concerns that she will address with her psychiatrist on Jan. 30.    Assessment:   minimal progress -Pt continues to report problems with mood, motivation, energy and focus.  She has started taking her night time medication a bit earlier and finds that to assist in her getting up earlier to start her day, however, she still feels slow and groggy for a bit.  Pt. Is frustrated because she doesn't see her psychiatrist for another 2 weeks and she wanted to get the go ahead to increase one of her medications.  She continues to spend time with her mother.  Work is busy and home life remains the same.  NO acute symptoms reported.    Plan:  continue psychotherapy and other resources    1. Major depressive disorder, recurrent, moderate (HCC)       Ivis Wallace, was evaluated through a synchronous (real-time) audio-video encounter. The patient (or guardian if applicable) is aware that this is a billable service, which includes applicable co-pays. This Virtual Visit was conducted with patient's (and/or legal guardian's) consent. Patient identification was verified, and a caregiver was present when appropriate.   The patient was located at Home: [address] [place] [address] [address] [place] [id number]  Provider was located at Home (Appt Dept State[place]  Confirm you are appropriately licensed, registered, or certified to deliver care in the state where the

## 2025-01-30 ENCOUNTER — TELEMEDICINE (OUTPATIENT)
Age: 60
End: 2025-01-30
Payer: COMMERCIAL

## 2025-01-30 DIAGNOSIS — F33.1 MAJOR DEPRESSIVE DISORDER, RECURRENT, MODERATE (HCC): Primary | ICD-10-CM

## 2025-01-30 PROCEDURE — 90832 PSYTX W PT 30 MINUTES: CPT | Performed by: SOCIAL WORKER

## 2025-01-30 NOTE — PSYCHOTHERAPY
Virtual Visit (At Home) -Follow Up    DX:    Diagnosis Orders   1. Major depressive disorder, recurrent, moderate (HCC)             Met with Ms. Wallace today for our follow up appt. minimal progress-this patient reports worry and concern over her mother as she had another \"incident\" last week that is another example of her mother's slow deterioration.  The patient doesn't feel she can do anything else re: her mother's care without causing her unhappiness (ie, putting her in a more restrictive environment versus her own place).  Additionally, Ms. Wallace does not state any change in her overall mood. She is scheduled to meet with her psychiatrist today and hopes he will alter her medication as she describes feeling \"just okay.\"  We talked about questioning this doctor as to what his goal is for the patient and making sure that he is able to actually help her with her medication needs as she has not been feeling good for quite some time.  No acute symptoms reported. We have scheduled weekly appts to monitor the patient's depression.    We did set a follow-up appointment with this clinician.      Follow-up appt date (if applicable) is:  Feb. 6 @ 11 VV    --Mary Martinez LCSW on 1/30/2025 at 12:12 PM    An electronic signature was used to authenticate this note.

## 2025-02-06 ENCOUNTER — TELEMEDICINE (OUTPATIENT)
Age: 60
End: 2025-02-06
Payer: COMMERCIAL

## 2025-02-06 DIAGNOSIS — F33.1 MAJOR DEPRESSIVE DISORDER, RECURRENT, MODERATE (HCC): Primary | ICD-10-CM

## 2025-02-06 PROCEDURE — 90834 PSYTX W PT 45 MINUTES: CPT | Performed by: SOCIAL WORKER

## 2025-02-06 NOTE — PROGRESS NOTES
Follow Up Virtual Visit Billing    Session Time     Start Time:    11:00 am  Finish Time:  11:42 am    Total time spent for this encounter:  42 minutes    We did set a follow-up appointment with this clinician.      Follow up:  Feb. 13 @ 2 VV     Therapy Modalities   Building Self Esteem, Building Insight, Cognitive Behavioral, and Stress Management    Assessment / Plan     Psychotherapy Target Symptoms:  change in daily functioning , decreased ADLs , depressed mood, feelings of helplessness /  hopefulness, and lack of exercise.    Assessment:   minimal improvement -This patient reports she has been taking the added medication of Paxil for the past week and hasn't felt a change yet but will give it more time.  It was recommended by her psychiatrist to look into TMS as well as increase/add exercise to her routine.  The patient has been scheduled for a consultation for TMS but she is unsure if she is ready for such a commitment as it is a daily regime, 90 minutes per day for 5-6 weeks.  The patient describes her life as very routine, working, going to visit her mother, eat dinner, watch tv.  We discussed adding or changing the routine to add exercise into the day and perhaps visit her mother less frequently.  The patient reports frustration re: her daily moods and hopes things will improve in the near future.  No acute symptoms reported. We continue to meet weekly at this time.    Plan:  continue psychotherapy and pt was referred for possible TMS thru psychiatry and she will be receiving a consult next week.    1. Major depressive disorder, recurrent, moderate (HCC)       Ivis Wallace, was evaluated through a synchronous (real-time) audio-video encounter. The patient (or guardian if applicable) is aware that this is a billable service, which includes applicable co-pays. This Virtual Visit was conducted with patient's (and/or legal guardian's) consent. Patient identification was verified, and a caregiver was

## 2025-02-06 NOTE — PSYCHOTHERAPY
Virtual Visit (At Home) -Follow Up    DX:    Diagnosis Orders   1. Major depressive disorder, recurrent, moderate (HCC)             Met with Ms. Wallace today for our follow up appt. minimal improvement-This patient reports she has been taking the added medication of Paxil for the past week and hasn't felt a change yet but will give it more time.  It was recommended by her psychiatrist to look into TMS as well as increase/add exercise to her routine.  The patient has been scheduled for a consultation for TMS but she is unsure if she is ready for such a commitment as it is a daily regime, 90 minutes per day for 5-6 weeks.  The patient describes her life as very routine, working, going to visit her mother, eat dinner, watch tv.  We discussed adding or changing the routine to add exercise into the day and perhaps visit her mother less frequently.  The patient reports frustration re: her daily moods and hopes things will improve in the near future.  No acute symptoms reported. We continue to meet weekly at this time    We did set a follow-up appointment with this clinician.      Follow-up appt date (if applicable) is:  Feb. 13 @ 2 VV.      --Mary Martinez LCSW on 2/6/2025 at 12:18 PM    An electronic signature was used to authenticate this note.

## 2025-02-13 ENCOUNTER — TELEMEDICINE (OUTPATIENT)
Age: 60
End: 2025-02-13
Payer: COMMERCIAL

## 2025-02-13 DIAGNOSIS — F33.1 MAJOR DEPRESSIVE DISORDER, RECURRENT, MODERATE (HCC): Primary | ICD-10-CM

## 2025-02-13 PROCEDURE — 90832 PSYTX W PT 30 MINUTES: CPT | Performed by: SOCIAL WORKER

## 2025-02-13 NOTE — PROGRESS NOTES
Follow Up Virtual Visit Billing    Session Time     Start Time:    2:00 pm  Finish Time:  2:38 pm    Total time spent for this encounter:  38 minutes    We did set a follow-up appointment with this clinician.      Return in about 2 weeks (around 2/27/2025).     Therapy Modalities   Building Self Esteem, Building Insight, and Client Empowerment    Assessment / Plan     Psychotherapy Target Symptoms:  decreased ADLs , depressed mood, and motivation, energy    Assessment:  Improving:   This patient, though small, is reporting an improvement in mood and energy.  She went to the gym 3 days last week and has decreased going to her mother's on a daily basis.  She also reported she has become a bit more focused, less anxious seeing clients face to face and thinking maybe the medication may finally be working more effectively.  Ms. Wallace had been referred by her psychiatrist to look into TMS, however, at this time, especially during the tax season, she can not participate in a daily program 90 minutes per day, 5 days per week.  She understands this may be a resource in the future but at this time, it is not practical.  NO acute symptoms reported.    Plan:  continue psychotherapy    1. Major depressive disorder, recurrent, moderate (HCC)       Ivis Wallace, was evaluated through a synchronous (real-time) audio-video encounter. The patient (or guardian if applicable) is aware that this is a billable service, which includes applicable co-pays. This Virtual Visit was conducted with patient's (and/or legal guardian's) consent. Patient identification was verified, and a caregiver was present when appropriate.   The patient was located at Home: [address] [place] [address] [address] [place] [id number]  Provider was located at Home [place]  Confirm you are appropriately licensed, registered, or certified to deliver care in the state where the patient is located as indicated above. If you are not or unsure, please

## 2025-02-27 ENCOUNTER — TELEMEDICINE (OUTPATIENT)
Age: 60
End: 2025-02-27
Payer: COMMERCIAL

## 2025-02-27 DIAGNOSIS — F33.1 MAJOR DEPRESSIVE DISORDER, RECURRENT, MODERATE (HCC): Primary | ICD-10-CM

## 2025-02-27 PROCEDURE — 90837 PSYTX W PT 60 MINUTES: CPT | Performed by: SOCIAL WORKER

## 2025-02-27 NOTE — PROGRESS NOTES
1Follow Up Virtual Visit Billing    Session Time     Start Time:    11:00 am  Finish Time:  11:53 am    Total time spent for this encounter:  53 minutes    We did set a follow-up appointment with this clinician.      Return in about 1 week (around 3/6/2025).     Therapy Modalities   Building Self Esteem, Building Insight, Cognitive , Client Empowerment, Crisis Intervention, Mindfulness, and Stress Management    Assessment / Plan     Psychotherapy Target Symptoms:  anxiety, change in daily functioning , decreased ADLs , depressed mood, feelings of helplessness /  hopefulness, increased sleeping, tearful, and relationship problems.    Assessment:  Deterioration:  This patient reports today she  is not doing well.  She was assessed for any acute symptoms and the patient denied any suicidal ideations.  She does state, however, that her boyfriend of 10 years \"lost it last night\" and verbalized his dissatisfaction re: his life/their relationship.  Per the patient, he is not happy with his life and wonders if he should be on his own.  Ms. Wallace is very upset today as she doesn't know how to help him.  She also reports she feels she is part of the problem.  According to Ms. Wallace, her boyfriend does not work, is isolated in the home, doesn't have many outside friends and doesn't initiate any activities, etc.  Because the patient has been working on stabilizing her medication and had several months of having significant depressive symptoms, she is associating herself in his unhappiness.  This therapist discussed  herself from his depression as well as him taking initiative toward his own mental health treatment.  Currently, per the patient, he is not taking medication or participating in counseling himself.  Ms. Wallace is scheduled to see this therapist next week but is also aware she can schedule earlier if needed.  The patient also is aware of contacting other mental health supports 24/7. (Ie

## 2025-02-27 NOTE — PSYCHOTHERAPY
Virtual Visit (At Home) -Follow Up    DX:    Diagnosis Orders   1. Major depressive disorder, recurrent, moderate (HCC)             Met with Ms. Wallace today for our follow up appt. Deterioration:  This patient reports today she is not doing well.  She was assessed for any acute symptoms and the patient denied any suicidal ideations.  She does state, however, that her boyfriend of 10 years \"lost it last night\" and verbalized his dissatisfaction re: his life/their relationship.  Per the patient, he is not happy with his life and wonders if he should be on his own.  Ms. Wallace is very upset today as she doesn't know how to help him.  She also reports she feels she is part of the problem.  According to Ms. Wallace, her boyfriend does not work, is isolated in the home, doesn't have many outside friends and doesn't initiate any activities, etc.  Because the patient has been working on stabilizing her medication and had several months of having significant depressive symptoms, she is associating herself in his unhappiness.  This therapist discussed  herself from his depression as well as him taking initiative toward his own mental health treatment.  Currently, per the patient, he is not taking medication or participating in counseling himself.  Ms. Wallace is scheduled to see this therapist next week but is also aware she can schedule earlier if needed.  The patient also is aware of contacting other mental health supports 24/7. ([facility], 9-1-1, crisis)      We did set a follow-up appointment with this clinician.      Follow-up appt date (if applicable) is:  March 6 VV    --Mary Martinez LCSW on 2/27/2025 at 2:44 PM    An electronic signature was used to authenticate this note.

## 2025-03-06 ENCOUNTER — TELEMEDICINE (OUTPATIENT)
Age: 60
End: 2025-03-06
Payer: COMMERCIAL

## 2025-03-06 DIAGNOSIS — F33.1 MAJOR DEPRESSIVE DISORDER, RECURRENT, MODERATE (HCC): Primary | ICD-10-CM

## 2025-03-06 PROCEDURE — 90837 PSYTX W PT 60 MINUTES: CPT | Performed by: SOCIAL WORKER

## 2025-03-06 NOTE — PROGRESS NOTES
Follow Up Virtual Visit Billing    Session Time     Start Time:    2:00 pm  Finish Time:  2:53 pm    Total time spent for this encounter:  53 minutes    We did set a follow-up appointment with this clinician.      Return in about 1 week (around 3/13/2025).     Therapy Modalities   Building Self Esteem, Building Insight, Client Empowerment, Mindfulness, and Stress Management    Assessment / Plan     Psychotherapy Target Symptoms:  anxiety, decreased ADLs , depressed mood, feelings of helplessness /  hopefulness, increased sleeping, and increasing daily performance, motivation    Assessment:   minimal progress -this patient reports continued lack of focus and motivation.  As she is a [employer] and she has deadlines for her clients, she is feeling pressured and anxious.  She knows a lot of her lack of energy and focus has to do with her depression but she also questions if she is \"burned out\" or tired of working.  The patient continues to worry about her aged parent and the dynamics in her personal relationship with her partner at home.  In all of these areas, the patient identifies feeling between a 2-3 in her happiness (5 being happy).  We discussed using a smaller scale of satisfaction re: her work by identifying how much she can complete right now at her job and feeling comfortable with that number.  If her expectations are too high, she feels frustrated and disappointed.  Ms. Wallace thought this was a good idea and she will work toward this goal prior to our next appt.  No acute symptoms reported.    Plan:  continue psychotherapy    1. Major depressive disorder, recurrent, moderate (HCC)       Ivis Wallace, was evaluated through a synchronous (real-time) audio-video encounter. The patient (or guardian if applicable) is aware that this is a billable service, which includes applicable co-pays. This Virtual Visit was conducted with patient's (and/or legal guardian's) consent. Patient identification was verified,

## 2025-03-13 ENCOUNTER — TELEMEDICINE (OUTPATIENT)
Age: 60
End: 2025-03-13
Payer: COMMERCIAL

## 2025-03-13 DIAGNOSIS — F33.1 MAJOR DEPRESSIVE DISORDER, RECURRENT, MODERATE (HCC): Primary | ICD-10-CM

## 2025-03-13 PROCEDURE — 90832 PSYTX W PT 30 MINUTES: CPT | Performed by: SOCIAL WORKER

## 2025-03-13 NOTE — PROGRESS NOTES
Follow Up Virtual Visit Billing    Session Time     Start Time:    3:00 pm  Finish Time:  3:32 pm    Total time spent for this encounter:  32 minutes    We did set a follow-up appointment with this clinician.      Return in about 2 weeks (around 3/27/2025).     Therapy Modalities   Building Self Esteem, Cognitive , Client Empowerment, Mindfulness, Stress Management, and support and encouragement    Assessment / Plan     Psychotherapy Target Symptoms:  depressed mood, feelings of helplessness /  hopefulness, increased sleeping, and increasing activity/exercise    Assessment:  No Progress-At this time Ms. Wallace is discouraged.  She met with her psychiatrist today and he has planned to increase her Wellbutrin and Paxil.  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  Her psychiatrist also suggested other types of treatment as she stated, her doctor thinks she may be medication resistant at this time and they have to \"look outside of the box.\"  This therapist discussed taking one day at a time, as stated before, and try and meet a personal goal each day.  Ms. Wallace reported that the other night she went to the gym and felt better that night for including exercise into the day.  We talked about maybe getting up in the am and starting the day with that added energy but she admits that she has a very hard time getting out of bed and starts her day out slowly.  The patient is aware that this therapist will be out of town next week.  We talked about if she needs any mental health assistance, to contact her PCP and emergency phone numbers for crisis were also provided. The patient denies any type of suicidal or homicidal thoughts/ideations.      Plan:  continue psychotherapy and medication maintenance    1. Major depressive disorder, recurrent, moderate (HCC)       Ivis Wallace, was evaluated

## 2025-03-13 NOTE — PSYCHOTHERAPY
Virtual Visit (At Home) -Follow Up    DX:    Diagnosis Orders   1. Major depressive disorder, recurrent, moderate (HCC)             Met with Ms. Wallace today for our follow up appt.  No Progress-At this time Ms. Wallace is discouraged.  She met with her psychiatrist today and he has planned to increase her Wellbutrin and Paxil.  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  Her psychiatrist also suggested other types of treatment as she stated, her doctor thinks she may be medication resistant at this time and they have to \"look outside of the box.\"  This therapist discussed taking one day at a time, as stated before, and try and meet a personal goal each day.  Ms. Wallace reported that the other night she went to the gym and felt better that night for including exercise into the day.  We talked about maybe getting up in the am and starting the day with that added energy but she admits that she has a very hard time getting out of bed and starts her day out slowly.  The patient is aware that this therapist will be out of town next week.  We talked about if she needs any mental health assistance, to contact her PCP and emergency phone numbers for crisis were also provided. The patient denies any type of suicidal or homicidal thoughts/ideations.        We did set a follow-up appointment with this clinician.      Follow-up appt date (if applicable) is:  2 weeks VV    --Mary Martinez LCSW on 3/13/2025 at 4:40 PM    An electronic signature was used to authenticate this note.

## 2025-03-25 ENCOUNTER — TELEMEDICINE (OUTPATIENT)
Age: 60
End: 2025-03-25
Payer: COMMERCIAL

## 2025-03-25 DIAGNOSIS — F33.1 MAJOR DEPRESSIVE DISORDER, RECURRENT, MODERATE (HCC): Primary | ICD-10-CM

## 2025-03-25 PROCEDURE — 90832 PSYTX W PT 30 MINUTES: CPT | Performed by: SOCIAL WORKER

## 2025-03-25 NOTE — PROGRESS NOTES
Follow Up Virtual Visit Billing    Session Time     Start Time:    11:00 am  Finish Time:  11:34 am    Total time spent for this encounter:  34 minutes    We did set a follow-up appointment with this clinician.      Return in about 1 week (around 4/1/2025).     Therapy Modalities   Building Insight, Cognitive , Client Empowerment, Mindfulness, and Stress Management    Assessment / Plan     Psychotherapy Target Symptoms:  decreased ADLs , depressed mood, feelings of helplessness /  hopefulness, and lack of motivation, energy    Assessment:  Improving:   this patient reports for the first time in several months that she \"thinks \" she is feeling a bit better. She is hesitant to express this due to her very slow progress re: her depression.  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  She continues to report that she \"is not performing like she used to,\" thus causing her to feel negative about herself.  We talked today about trying to not compare herself to how she has been performing in previous years but based on all that is going on currently, to allow herself some lory.  We discussed rewarding herself after the tax deadline by taking time for herself.  She is meeting up with her psychiatrist again in early April.  Ms. Wallace reports she hopes she will continue to feel more positive in the next few weeks.  No acute symptoms reported.    Plan:  continue psychotherapy and exercise    1. Major depressive disorder, recurrent, moderate (HCC)       Ivis Wallace, was evaluated through a synchronous (real-time) audio-video encounter. The patient (or guardian if applicable) is aware that this is a billable service, which includes applicable co-pays. This Virtual Visit was conducted with patient's (and/or legal guardian's) consent. Patient identification was verified, and a caregiver was present when appropriate.

## 2025-03-25 NOTE — PSYCHOTHERAPY
Virtual Visit (At Home) -Follow Up    DX:    Diagnosis Orders   1. Major depressive disorder, recurrent, moderate (HCC)             Met with Ms. Wallace today for our follow up appt. Improving:   this patient reports for the first time in several months that she \"thinks\" she is feeling a bit better. She is hesitant to express this due to her very slow progress re: her depression.  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  She continues to report that she \"is not performing like she used to,\" thus causing her to feel negative about herself.  We talked today about trying to not compare herself to how she has been performing in previous years but based on all that is going on currently, to allow herself some lory.  We discussed rewarding herself after the tax deadline by taking time for herself.  She is meeting up with her psychiatrist again in early April.  Ms. Wallace reports she hopes she will continue to feel more positive in the next few weeks.  No acute symptoms reported.    We did set a follow-up appointment with this clinician.      Follow-up appt date (if applicable) is:  1 week VV    --Mary Martinez LCSW on 3/25/2025 at 4:37 PM    An electronic signature was used to authenticate this note.

## 2025-04-03 ENCOUNTER — TELEMEDICINE (OUTPATIENT)
Age: 60
End: 2025-04-03
Payer: COMMERCIAL

## 2025-04-03 DIAGNOSIS — F33.1 MAJOR DEPRESSIVE DISORDER, RECURRENT, MODERATE (HCC): Primary | ICD-10-CM

## 2025-04-03 PROCEDURE — 90832 PSYTX W PT 30 MINUTES: CPT | Performed by: SOCIAL WORKER

## 2025-04-03 NOTE — PROGRESS NOTES
Follow Up Virtual Visit Billing    Session Time     Start Time:    2:19 pm  Finish Time:  2:36 pm    Total time spent for this encounter:  17 minutes    We did set a follow-up appointment with this clinician.      Return in about 1 week (around 4/10/2025).     Therapy Modalities   Building Self Esteem, Cognitive , Client Empowerment, and Stress Management    Assessment / Plan     Psychotherapy Target Symptoms:  appetite decreased, decreased ADLs , depressed mood, feelings of helplessness /  hopefulness, and motivation, energy    Assessment:  No Progress-this patient reports she has had a bad week and today has been a \"rough day.\"  She was unable to get to the office today until around 1:30 and admits to having problems pushing thru any type of work.  She has not had an appetite recently, again, and reported that she work up very anxious today.  She states she has had a hard time getting motivated in the am but has not had the anxiety in the morning like she did today.  She feels like it is a combination of burn out and mood.  Ms. Wallace does report she has these burn out moments every year but feels this year has been so much worse.  The patient stated that even her boyfriend has noticed a change in her.  Ms. Wallace has gone to the gym as well as attended soccer.  She plans on going to her class at the gym tonight as well.  She is not reporting any acute symptoms but seems helpless in her ability to change her energy level or mood.    Plan:  continue psychotherapy    1. Major depressive disorder, recurrent, moderate (HCC)       Ivis Wallace, was evaluated through a synchronous (real-time) audio-video encounter. The patient (or guardian if applicable) is aware that this is a billable service, which includes applicable co-pays. This Virtual Visit was conducted with patient's (and/or legal guardian's) consent. Patient identification was verified, and a caregiver was present when appropriate.   The patient

## 2025-04-03 NOTE — PSYCHOTHERAPY
Virtual Visit (At Home) -Follow Up    DX:    Diagnosis Orders   1. Major depressive disorder, recurrent, moderate (HCC)             Met with Ms. Wallace today for our follow up appt.  No Progress-this patient reports she has had a bad week and today has been a \"rough day.\"  She was unable to get to the office today until around 1:30 and admits to having problems pushing thru any type of work.  She has not had an appetite recently, again, and reported that she work up very anxious today.  She states she has had a hard time getting motivated in the am but has not had the anxiety in the morning like she did today.  She feels like it is a combination of burn out and mood.  Ms. Wallace does report she has these burn out moments every year but feels this year has been so much worse.  The patient stated that even her boyfriend has noticed a change in her.  Ms. Wallace has gone to the gym as well as attended soccer.  She plans on going to her class at the gym tonight as well.  She is not reporting any acute symptoms but seems helpless in her ability to change her energy level or mood.    We did set a follow-up appointment with this clinician.      Follow-up appt date (if applicable) is:  next week, VV    --Mary Martinez LCSW on 4/3/2025 at 3:01 PM    An electronic signature was used to authenticate this note.

## 2025-04-10 ENCOUNTER — TELEMEDICINE (OUTPATIENT)
Age: 60
End: 2025-04-10
Payer: COMMERCIAL

## 2025-04-10 DIAGNOSIS — F33.1 MAJOR DEPRESSIVE DISORDER, RECURRENT, MODERATE (HCC): Primary | ICD-10-CM

## 2025-04-10 PROCEDURE — 90832 PSYTX W PT 30 MINUTES: CPT | Performed by: SOCIAL WORKER

## 2025-04-10 NOTE — PSYCHOTHERAPY
Virtual Visit (At Home) -Follow Up    DX:    Diagnosis Orders   1. Major depressive disorder, recurrent, moderate (HCC)             Met with Ms. Wallace today for our follow up appt. Improving:   This patient is feeling the anxiety re the deadline for tax completion. 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  As she continues to feel bad that some of her staff are \"working harder than me,\" she is still working.  She felt like yesterday she was particularly productive and as the days are narrowing for the deadline, she is working harder.  Ms. Wallace continues to question her medications as they do not seem particularly effective.  After taxes are completed, she reports she may look into the TMS program her psychiatrist suggested.  No acute symptoms reported today.    We did set a follow-up appointment with this clinician.      Follow-up appt date (if applicable) is:  April 17 VV    --Mary Martinez LCSW on 4/10/2025 at 3:36 PM    An electronic signature was used to authenticate this note.

## 2025-04-10 NOTE — PROGRESS NOTES
visit: Yes, I confirm.        --Mary Martinez, LCSW on 4/10/2025 at 3:25 PM    An electronic signature was used to authenticate this note.

## 2025-04-17 ENCOUNTER — TELEMEDICINE (OUTPATIENT)
Age: 60
End: 2025-04-17
Payer: COMMERCIAL

## 2025-04-17 DIAGNOSIS — F33.1 MAJOR DEPRESSIVE DISORDER, RECURRENT, MODERATE (HCC): Primary | ICD-10-CM

## 2025-04-17 PROCEDURE — 90834 PSYTX W PT 45 MINUTES: CPT | Performed by: SOCIAL WORKER

## 2025-04-17 NOTE — PSYCHOTHERAPY
Virtual Visit (At Home) -Follow Up    DX:    Diagnosis Orders   1. Major depressive disorder, recurrent, moderate (HCC)             Met with Ms. Wallace today for our follow up appt.  No Progress-This patient reports she \"got thru tax season\" but it was difficult.  She admits that she was not as productive as she has been in the past years and hopes she can get out of this \"funk\" soon.  The patient received an email from another coworker that hopes to partner or, at some point, take over the practice.  This coworker requested a meeting to further explore her opportunities at this office.  Ms. Wallace stated she was anxious about the meeting because she wasn't sure what to say or what she really wanted going forward.  She also was fearful that maybe this worker didn't feel like there was enough stability in the practice for her to invest her time.  We talked in great detail today about her upcoming meeting.  She seems to be lacking in her self confidence as well as what her future will be.  No acute symptoms observed or reported today.    We did set a follow-up appointment with this clinician.      Follow-up appt date (if applicable) is:  1 week, VV    --Mary Martinez LCSW on 4/17/2025 at 12:48 PM    An electronic signature was used to authenticate this note.

## 2025-04-17 NOTE — PROGRESS NOTES
Follow Up Virtual Visit Billing    Session Time     Start Time:    11:00 am  Finish Time:  11:43 am    Total time spent for this encounter:  43 minutes    We did set a follow-up appointment with this clinician.      Return in about 1 week (around 4/24/2025).     Therapy Modalities   Building Self Esteem, Cognitive , Client Empowerment, Mindfulness, Stress Management, and support    Assessment / Plan     Psychotherapy Target Symptoms:  anxiety, decreased ADLs , depressed mood, feelings of helplessness /  hopefulness, and self doubt, lack of motivation, energy.    Assessment:  No Progress-This patient reports she \"got thru tax season\" but it was difficult.  She admits that she was not as productive as she has been in the past years and hopes she can get out of this \"funk\" soon.  The patient received an email from another coworker that hopes to partner or, at some point, take over the practice.  This coworker requested a meeting to further explore her opportunities at this office.  Ms. Wallace stated she was anxious about the meeting because she wasn't sure what to say or what she really wanted going forward.  She also was fearful that maybe this worker didn't feel like there was enough stability in the practice for her to invest her time.  We talked in great detail today about her upcoming meeting.  She seems to be lacking in her self confidence as well as what her future will be.  No acute symptoms observed or reported today.    Plan:  continue psychotherapy    1. Major depressive disorder, recurrent, moderate (HCC)       Ivis Wallace, was evaluated through a synchronous (real-time) audio-video encounter. The patient (or guardian if applicable) is aware that this is a billable service, which includes applicable co-pays. This Virtual Visit was conducted with patient's (and/or legal guardian's) consent. Patient identification was verified, and a caregiver was present when appropriate.   The patient was located

## 2025-04-24 ENCOUNTER — TELEMEDICINE (OUTPATIENT)
Age: 60
End: 2025-04-24
Payer: COMMERCIAL

## 2025-04-24 DIAGNOSIS — F33.1 MAJOR DEPRESSIVE DISORDER, RECURRENT, MODERATE (HCC): Primary | ICD-10-CM

## 2025-04-24 PROCEDURE — 90834 PSYTX W PT 45 MINUTES: CPT | Performed by: SOCIAL WORKER

## 2025-04-24 NOTE — PROGRESS NOTES
Follow Up Virtual Visit Billing    Session Time     Start Time:    2:00 pm  Finish Time:  2:46 pm    Total time spent for this encounter:  46 minutes    We did set a follow-up appointment with this clinician.      Return in about 2 weeks (around 5/8/2025).     Therapy Modalities   Building Insight, Cognitive , Client Empowerment, Mindfulness, Stress Management, and support.    Assessment / Plan     Psychotherapy Target Symptoms:  decreased ADLs , depressed mood, feelings of helplessness /  hopefulness, and frustration re: lack of progress, lack of motivation, energy.    Assessment:   minimal progress -this patient reports that she met with other personnel at her job and they discussed plans for the summer and changes in work staff, etc.  We also discussed her mother's deterioration and unsure what to do to help her. This therapist suggested calling [facility] on Aging and other senior options.  Additionally we talked about the lack of progress the patient is feeling re: her depression.  We discussed maybe needing another therapist, other psychiatrist or looking outside of the box at the [facility] treatment program.  As the patient has participated in [facility], she did not want to use that program again.  Ms. Wallace is going to contact this resource and find out more, prior to our next appt.  NO acute symptoms reported or observed.    Plan:  continue psychotherapy, other resources, and other therapeutic intervention services to explore ([facility])    1. Major depressive disorder, recurrent, moderate (HCC)       Ivis Wallace, was evaluated through a synchronous (real-time) audio-video encounter. The patient (or guardian if applicable) is aware that this is a billable service, which includes applicable co-pays. This Virtual Visit was conducted with patient's (and/or legal guardian's) consent. Patient identification was verified, and a caregiver was present when appropriate.   The patient was located at Home:

## 2025-04-24 NOTE — PSYCHOTHERAPY
Virtual Visit (At Home) -Follow Up    DX:    Diagnosis Orders   1. Major depressive disorder, recurrent, moderate (HCC)             Met with Ms. Wallace today for our follow up appt.  minimal progress-this patient reports that she met with other personnel at her job and they discussed plans for the summer and changes in work staff, etc.  We also discussed her mother's deterioration and unsure what to do to help her. This therapist suggested calling [facility] on Aging and other senior options.  Additionally we talked about the lack of progress the patient is feeling re: her depression.  We discussed maybe needing another therapist, other psychiatrist or looking outside of the box at the [facility] treatment program.  As the patient has participated in [facility], she did not want to use that program again.  Ms. Wallace is going to contact this resource and find out more, prior to our next appt.  NO acute symptoms reported or observed.    We did set a follow-up appointment with this clinician.      Follow-up appt date (if applicable) is:  May 8 @ 12, May 15 @ 2 and May 22 @ 11-all VV.    --Mary Martinez LCSW on 4/24/2025 at 4:22 PM    An electronic signature was used to authenticate this note.

## 2025-05-08 ENCOUNTER — TELEMEDICINE (OUTPATIENT)
Age: 60
End: 2025-05-08
Payer: COMMERCIAL

## 2025-05-08 DIAGNOSIS — F33.1 MAJOR DEPRESSIVE DISORDER, RECURRENT, MODERATE (HCC): Primary | ICD-10-CM

## 2025-05-08 PROCEDURE — 90832 PSYTX W PT 30 MINUTES: CPT | Performed by: SOCIAL WORKER

## 2025-05-08 NOTE — PROGRESS NOTES
Follow Up Virtual Visit Billing    Session Time     Start Time:    11:35 am  Finish Time:  11:54 am    Total time spent for this encounter:  19 minutes    We did set a follow-up appointment with this clinician.      Return in about 1 week (around 5/15/2025).     Therapy Modalities   Building Insight, Cognitive , Client Empowerment, Mindfulness, and Stress Management    Assessment / Plan     Psychotherapy Target Symptoms:  decreased ADLs , depressed mood, feelings of helplessness /  hopefulness, and relationship issues    Assessment:  No Progress-this patient reports her boyfriend had a \"meltdown\" re: his lack of finances and this interaction made her feel sad and depressed.  Although she has offered to assist him with necessary bills, he will not accept her help.  She states she feels \"stuck\" when he has these moods because she doesn't know how to help him.  Ms. Wallace states she has provided several suggestions, however, he is not receptive to any of those.  Ms. Wallace has met with the staff at [facility] and had her first introduction to the specific therapy they offer to address her depression.  Although she just met with the , she will now be meeting with the  To further explain the process.  She will meet with that Doctor on May 29.  From there she will decide if this treatment will be of benefit.  She does not feel anything right now is working, however, she does state that her personal life is causing the majority of her depression right now.  NO acute symptoms reported or observed.    Plan:  continue psychotherapy    1. Major depressive disorder, recurrent, moderate (HCC)       Ivis Wallace, was evaluated through a synchronous (real-time) audio-video encounter. The patient (or guardian if applicable) is aware that this is a billable service, which includes applicable co-pays. This Virtual Visit was conducted with patient's (and/or legal guardian's) consent. Patient identification

## 2025-05-08 NOTE — PSYCHOTHERAPY
Virtual Visit (At Home) -Follow Up    DX:    Diagnosis Orders   1. Major depressive disorder, recurrent, moderate (HCC)             Met with Ms. Wallace today for our follow up appt.  No Progress-this patient reports her boyfriend had a \"meltdown\" re: his lack of finances and this interaction made her feel sad and depressed.  Although she has offered to assist him with necessary bills, he will not accept her help.  She states she feels \"stuck\" when he has these moods because she doesn't know how to help him.  Ms. Wallace states she has provided several suggestions, however, he is not receptive to any of those.  Ms. Wallace has met with the staff at [facility] and had her first introduction to the specific therapy they offer to address her depression.  Although she just met with the , she will now be meeting with the  To further explain the process.  She will meet with that Doctor on May 29.  From there she will decide if this treatment will be of benefit.  She does not feel anything right now is working, however, she does state that her personal life is causing the majority of her depression right now.  NO acute symptoms reported or observed.    We did set a follow-up appointment with this clinician.      Follow-up appt date (if applicable) is:  May 15 VV    --Mary Martinez LCSW on 5/8/2025 at 12:59 PM    An electronic signature was used to authenticate this note.

## 2025-05-15 ENCOUNTER — TELEMEDICINE (OUTPATIENT)
Age: 60
End: 2025-05-15
Payer: COMMERCIAL

## 2025-05-15 DIAGNOSIS — F33.1 MAJOR DEPRESSIVE DISORDER, RECURRENT, MODERATE (HCC): Primary | ICD-10-CM

## 2025-05-15 PROCEDURE — 90834 PSYTX W PT 45 MINUTES: CPT | Performed by: SOCIAL WORKER

## 2025-05-15 NOTE — PSYCHOTHERAPY
Virtual Visit (At Home) -Follow Up    DX:    Diagnosis Orders   1. Major depressive disorder, recurrent, moderate (HCC)             Met with Ms. Wallace today for our follow up appt. minimal progress:This patient reports not much has changed.  She described some recent developments with her mother as an example of an increase in confusion.  Ms. Wallace has been trying to become more social with attending her [employer] club meeting, going to the gym and also attended a women's business group gathering.  The business group gathering was not successful, per the patient, as she felt like she didn't have much to share or had much in common with these women.  The patient talked today about her meeting at 4:00 today with [facility] and we worked on some of her anxiety about going for the assessment as well as writing out some questions for her to ask.  This therapist discussed how the patient puts up obstacles to her own growth and potential happiness.  Ms. Wallace admits that she is a \"home body\" and feels safe in her space.  No acute symptoms reported today.      We did set a follow-up appointment with this clinician.      Follow-up appt date (if applicable) is:  May 29 @ 2:00 VV.    --Mary Martinez LCSW on 5/15/2025 at 4:30 PM    An electronic signature was used to authenticate this note.

## 2025-05-15 NOTE — PROGRESS NOTES
Follow Up Virtual Visit Billing    Session Time     Start Time:    2:11 pm  Finish Time:  2:56 pm    Total time spent for this encounter:  45 minutes    We did set a follow-up appointment with this clinician.      Return in about 1 week (around 5/22/2025).     Therapy Modalities   Building Self Esteem, Building Insight, Cognitive , Client Empowerment, and Stress Management    Assessment / Plan     Psychotherapy Target Symptoms:  decreased ADLs , depressed mood, feelings of helplessness /  hopefulness, and lack of motivation and energy.    Assessment:  minimal progress:This patient reports not much has changed.  She described some recent developments with her mother as an example of an increase in confusion.  Ms. Wallace has been trying to become more social with attending her rotary club meeting, going to the gym and also attended a women's business group gathering.  The business group gathering was not successful, per the patient, as she felt like she didn't have much to share or had much in common with these women.  The patient talked today about her meeting at 4:00 today with [facility] and we worked on some of her anxiety about going for the assessment as well as writing out some questions for her to ask.  This therapist discussed how the patient puts up obstacles to her own growth and potential happiness.  Ms. Wallace admits that she is a \"home body\" and feels safe in her space.  No acute symptoms reported today.    Plan:  continue psychotherapy, investigating new type of therapy.    1. Major depressive disorder, recurrent, moderate (HCC)       Ivis Wallace, was evaluated through a synchronous (real-time) audio-video encounter. The patient (or guardian if applicable) is aware that this is a billable service, which includes applicable co-pays. This Virtual Visit was conducted with patient's (and/or legal guardian's) consent. Patient identification was verified, and a caregiver was present

## 2025-05-22 ENCOUNTER — TELEMEDICINE (OUTPATIENT)
Age: 60
End: 2025-05-22
Payer: COMMERCIAL

## 2025-05-22 DIAGNOSIS — F33.1 MAJOR DEPRESSIVE DISORDER, RECURRENT, MODERATE (HCC): Primary | ICD-10-CM

## 2025-05-22 PROCEDURE — 90834 PSYTX W PT 45 MINUTES: CPT | Performed by: SOCIAL WORKER

## 2025-05-22 NOTE — PSYCHOTHERAPY
Virtual Visit (At Home) -Follow Up    DX:    Diagnosis Orders   1. Major depressive disorder, recurrent, moderate (HCC)           Met with Ms. Wallaec today for our follow up appt. No Progress-this patient went to the TMS clinic but was disappointed in that the doctor that provided the consult suggested she first address her sleep apnea and get a C-Pap machine.  According to the patient, the doctor did not feel comfortable starting TMS until she gets her sleep apnea under control.  He felt like the treatment would not be as effective.  Ms. Wallace is unsure what to do at this point.  She did state that it has been over a year since she had lab work done so maybe she should reach out to her PCP to look at medical health in case there are medical concerns that could cause her to feel depressed.      We did set a follow-up appointment with this clinician.      Follow-up appt date (if applicable) is:  next week    --Mary Martinez LCSW on 5/22/2025 at 5:23 PM    An electronic signature was used to authenticate this note.

## 2025-05-22 NOTE — PROGRESS NOTES
Follow Up Virtual Visit Billing    Session Time     Start Time:    11:04 am  Finish Time:  11:56 am    Total time spent for this encounter:  52 minutes    We did set a follow-up appointment with this clinician.      Return in about 1 week (around 5/29/2025).     Therapy Modalities   Building Self Esteem, Building Insight, Cognitive , Client Empowerment, Mindfulness, and Stress Management    Assessment / Plan     Psychotherapy Target Symptoms:  change in daily functioning , decreased ADLs , depressed mood, and motivation, energy, communication with boyfriend    Assessment:  No Progress-this patient went to the TMS clinic but was disappointed in that the doctor that provided the consult suggested she first address her sleep apnea and get a C-Pap machine.  According to the patient, the doctor did not feel comfortable starting TMS until she gets her sleep apnea under control.  He felt like the treatment would not be as effective.  Ms. Wallace is unsure what to do at this point.  She did state that it has been over a year since she had lab work done so maybe she should reach out to her PCP to look at medical health in case there are medical concerns that could cause her to feel depressed.      Plan:  continue psychotherapy and medication maintenance.    1. Major depressive disorder, recurrent, moderate (HCC)       Ivis Wallace, was evaluated through a synchronous (real-time) audio-video encounter. The patient (or guardian if applicable) is aware that this is a billable service, which includes applicable co-pays. This Virtual Visit was conducted with patient's (and/or legal guardian's) consent. Patient identification was verified, and a caregiver was present when appropriate.   The patient was located at Home: [address] [place] [address] [address] [place] [address]  Provider was located at Home [place]  Confirm you are appropriately licensed, registered, or certified to deliver care in the state where the

## 2025-05-23 ENCOUNTER — OFFICE VISIT (OUTPATIENT)
Age: 60
End: 2025-05-23
Payer: COMMERCIAL

## 2025-05-23 VITALS
HEIGHT: 61 IN | RESPIRATION RATE: 18 BRPM | SYSTOLIC BLOOD PRESSURE: 117 MMHG | DIASTOLIC BLOOD PRESSURE: 69 MMHG | TEMPERATURE: 98.2 F | BODY MASS INDEX: 25.43 KG/M2 | WEIGHT: 134.7 LBS | OXYGEN SATURATION: 98 % | HEART RATE: 62 BPM

## 2025-05-23 DIAGNOSIS — Z01.89 ASSESSMENT OF EFFECTS OF PSYCHOTROPIC DRUG IN PATIENT AT RISK FOR METABOLIC SYNDROME: ICD-10-CM

## 2025-05-23 DIAGNOSIS — E78.2 MIXED HYPERLIPIDEMIA: ICD-10-CM

## 2025-05-23 DIAGNOSIS — F33.2 SEVERE EPISODE OF RECURRENT MAJOR DEPRESSIVE DISORDER, WITHOUT PSYCHOTIC FEATURES (HCC): Primary | ICD-10-CM

## 2025-05-23 DIAGNOSIS — Z79.899 ASSESSMENT OF EFFECTS OF PSYCHOTROPIC DRUG IN PATIENT AT RISK FOR METABOLIC SYNDROME: ICD-10-CM

## 2025-05-23 DIAGNOSIS — Z13.1 DIABETES MELLITUS SCREENING: ICD-10-CM

## 2025-05-23 DIAGNOSIS — I10 ESSENTIAL HYPERTENSION: ICD-10-CM

## 2025-05-23 DIAGNOSIS — E55.9 HYPOVITAMINOSIS D: ICD-10-CM

## 2025-05-23 DIAGNOSIS — Z51.81 MEDICATION MONITORING ENCOUNTER: ICD-10-CM

## 2025-05-23 DIAGNOSIS — Z91.89 ASSESSMENT OF EFFECTS OF PSYCHOTROPIC DRUG IN PATIENT AT RISK FOR METABOLIC SYNDROME: ICD-10-CM

## 2025-05-23 DIAGNOSIS — G47.33 OSA (OBSTRUCTIVE SLEEP APNEA): ICD-10-CM

## 2025-05-23 DIAGNOSIS — F41.8 MIXED ANXIETY DEPRESSIVE DISORDER: ICD-10-CM

## 2025-05-23 PROCEDURE — 3017F COLORECTAL CA SCREEN DOC REV: CPT | Performed by: NURSE PRACTITIONER

## 2025-05-23 PROCEDURE — 1036F TOBACCO NON-USER: CPT | Performed by: NURSE PRACTITIONER

## 2025-05-23 PROCEDURE — 3078F DIAST BP <80 MM HG: CPT | Performed by: NURSE PRACTITIONER

## 2025-05-23 PROCEDURE — G8427 DOCREV CUR MEDS BY ELIG CLIN: HCPCS | Performed by: NURSE PRACTITIONER

## 2025-05-23 PROCEDURE — 99214 OFFICE O/P EST MOD 30 MIN: CPT | Performed by: NURSE PRACTITIONER

## 2025-05-23 PROCEDURE — G8419 CALC BMI OUT NRM PARAM NOF/U: HCPCS | Performed by: NURSE PRACTITIONER

## 2025-05-23 PROCEDURE — 3074F SYST BP LT 130 MM HG: CPT | Performed by: NURSE PRACTITIONER

## 2025-05-23 RX ORDER — MIRTAZAPINE 15 MG/1
15 TABLET, FILM COATED ORAL NIGHTLY
COMMUNITY

## 2025-05-23 RX ORDER — PAROXETINE HYDROCHLORIDE 20 MG/1
20 TABLET, FILM COATED ORAL 2 TIMES DAILY
COMMUNITY

## 2025-05-23 RX ORDER — HYDROCHLOROTHIAZIDE 25 MG/1
25 TABLET ORAL DAILY
Qty: 90 TABLET | Refills: 3 | Status: SHIPPED | OUTPATIENT
Start: 2025-05-23

## 2025-05-23 RX ORDER — BREXPIPRAZOLE 2 MG/1
2 TABLET ORAL DAILY
COMMUNITY

## 2025-05-23 RX ORDER — BUPROPION HYDROCHLORIDE 150 MG/1
150 TABLET, EXTENDED RELEASE ORAL DAILY
COMMUNITY

## 2025-05-23 SDOH — ECONOMIC STABILITY: FOOD INSECURITY: WITHIN THE PAST 12 MONTHS, THE FOOD YOU BOUGHT JUST DIDN'T LAST AND YOU DIDN'T HAVE MONEY TO GET MORE.: NEVER TRUE

## 2025-05-23 SDOH — ECONOMIC STABILITY: FOOD INSECURITY: WITHIN THE PAST 12 MONTHS, YOU WORRIED THAT YOUR FOOD WOULD RUN OUT BEFORE YOU GOT MONEY TO BUY MORE.: NEVER TRUE

## 2025-05-23 ASSESSMENT — PATIENT HEALTH QUESTIONNAIRE - PHQ9
SUM OF ALL RESPONSES TO PHQ QUESTIONS 1-9: 7
3. TROUBLE FALLING OR STAYING ASLEEP: SEVERAL DAYS
SUM OF ALL RESPONSES TO PHQ QUESTIONS 1-9: 7
8. MOVING OR SPEAKING SO SLOWLY THAT OTHER PEOPLE COULD HAVE NOTICED. OR THE OPPOSITE, BEING SO FIGETY OR RESTLESS THAT YOU HAVE BEEN MOVING AROUND A LOT MORE THAN USUAL: SEVERAL DAYS
9. THOUGHTS THAT YOU WOULD BE BETTER OFF DEAD, OR OF HURTING YOURSELF: NOT AT ALL
4. FEELING TIRED OR HAVING LITTLE ENERGY: NOT AT ALL
SUM OF ALL RESPONSES TO PHQ QUESTIONS 1-9: 7
10. IF YOU CHECKED OFF ANY PROBLEMS, HOW DIFFICULT HAVE THESE PROBLEMS MADE IT FOR YOU TO DO YOUR WORK, TAKE CARE OF THINGS AT HOME, OR GET ALONG WITH OTHER PEOPLE: SOMEWHAT DIFFICULT
2. FEELING DOWN, DEPRESSED OR HOPELESS: SEVERAL DAYS
7. TROUBLE CONCENTRATING ON THINGS, SUCH AS READING THE NEWSPAPER OR WATCHING TELEVISION: SEVERAL DAYS
6. FEELING BAD ABOUT YOURSELF - OR THAT YOU ARE A FAILURE OR HAVE LET YOURSELF OR YOUR FAMILY DOWN: SEVERAL DAYS
SUM OF ALL RESPONSES TO PHQ QUESTIONS 1-9: 7
1. LITTLE INTEREST OR PLEASURE IN DOING THINGS: SEVERAL DAYS
5. POOR APPETITE OR OVEREATING: SEVERAL DAYS

## 2025-05-23 ASSESSMENT — LIFESTYLE VARIABLES
HOW OFTEN DO YOU HAVE A DRINK CONTAINING ALCOHOL: NEVER
HOW MANY STANDARD DRINKS CONTAINING ALCOHOL DO YOU HAVE ON A TYPICAL DAY: PATIENT DOES NOT DRINK

## 2025-05-23 NOTE — PROGRESS NOTES
[facility]  Clinic Note     Ivis Wallace (: 1965) is a 60 y.o. female, established patient, here for evaluation of the following chief complaint(s):  Annual Exam       ASSESSMENT/PLAN:    Assessment & Plan  Severe episode of recurrent major depressive disorder, without psychotic features (HCC)   - Chronic. Not well controlled  - See's Dr. Álvaro Cramer for psych. Working w/ Mary Martinez for counseling services   - Also considering TMS  Orders:    Comprehensive Metabolic Panel; Future    TSH + Free T4 Panel; Future    Essential hypertension    - Controlled. No dose adjustment at this time  Orders:    Comprehensive Metabolic Panel; Future    hydroCHLOROthiazide (HYDRODIURIL) 25 MG tablet; Take 1 tablet by mouth daily for blood pressure    Mixed anxiety depressive disorder    Chronic, not well controlled  - See's Dr. Álvaro Cramer for psych. Working w/ Mary Martinez for counseling services   Orders:    Comprehensive Metabolic Panel; Future    TSH + Free T4 Panel; Future    Mixed hyperlipidemia   - Not fasting today. Consider recheck one labs reviewed.  - Not on statin therapy at this time.  Orders:    Comprehensive Metabolic Panel; Future    Lipid Panel; Future    Medication monitoring encounter   Orders:    CBC with Auto Differential; Future    Assessment of effects of psychotropic drug in patient at risk for metabolic syndrome   Orders:    CBC with Auto Differential; Future    Comprehensive Metabolic Panel; Future    Lipid Panel; Future    Diabetes mellitus screening   Orders:    Hemoglobin A1C; Future    Hypovitaminosis D   Orders:    Vitamin D 25 Hydroxy; Future    GUANACO (obstructive sleep apnea)   - New diagnosis  - Request results from ordering provider Dr. Yen (cardiology)         Return in about 1 year (around 2026), or if symptoms worsen or fail to improve.              SUBJECTIVE/OBJECTIVE:        History of Present Illness  Ivis Wallace  is a 60-year-old female here 
Chief Complaint   Patient presents with    Annual Exam         \"Have you been to the ER, urgent care clinic since your last visit?  Hospitalized since your last visit?\"    YES - When: approximately 6/2024 ago.  Where and Why: UTI, [facility]    “Have you seen or consulted any other health care providers outside of [facility] since your last visit?”    No    Have you had a mammogram?”   YES - Where: 3/2025 Nurse/CMA to request most recent records if not in the chart    Date of last Mammogram: 3/5/2024             Click Here for Release of Records Request           5/23/2025     4:36 PM   PHQ-9    Little interest or pleasure in doing things 1   Feeling down, depressed, or hopeless 1   Trouble falling or staying asleep, or sleeping too much 1   Feeling tired or having little energy 0   Poor appetite or overeating 1   Feeling bad about yourself - or that you are a failure or have let yourself or your family down 1   Trouble concentrating on things, such as reading the newspaper or watching television 1   Moving or speaking so slowly that other people could have noticed. Or the opposite - being so fidgety or restless that you have been moving around a lot more than usual 1   Thoughts that you would be better off dead, or of hurting yourself in some way 0   PHQ-2 Score 2   PHQ-9 Total Score 7   If you checked off any problems, how difficult have these problems made it for you to do your work, take care of things at home, or get along with other people? 1           Financial Resource Strain: Low Risk  (6/1/2023)    Overall Financial Resource Strain ([facility])     Difficulty of Paying Living Expenses: Not hard at all      Food Insecurity: No Food Insecurity (5/23/2025)    Hunger Vital Sign     Worried About Running Out of Food in the Last Year: Never true     Ran Out of Food in the Last Year: Never true          Health Maintenance Due   Topic Date Due    Shingles vaccine (1 of 2) Never done    
Medications

## 2025-05-23 NOTE — ASSESSMENT & PLAN NOTE
- Controlled. No dose adjustment at this time  Orders:    Comprehensive Metabolic Panel; Future    hydroCHLOROthiazide (HYDRODIURIL) 25 MG tablet; Take 1 tablet by mouth daily for blood pressure

## 2025-05-23 NOTE — ASSESSMENT & PLAN NOTE
Chronic, not well controlled  - See's Dr. Álvaro Cramer for psych. Working w/ Mary Martinez for counseling services   Orders:    Comprehensive Metabolic Panel; Future    TSH + Free T4 Panel; Future

## 2025-05-24 LAB
25(OH)D3 SERPL-MCNC: 19.5 NG/ML (ref 30–100)
ALBUMIN SERPL-MCNC: 3.6 G/DL (ref 3.5–5)
ALBUMIN/GLOB SERPL: 1.1 (ref 1.1–2.2)
ALP SERPL-CCNC: 90 U/L (ref 45–117)
ALT SERPL-CCNC: 24 U/L (ref 12–78)
ANION GAP SERPL CALC-SCNC: 3 MMOL/L (ref 2–12)
AST SERPL-CCNC: 15 U/L (ref 15–37)
BASOPHILS # BLD: 0.03 K/UL (ref 0–0.1)
BASOPHILS NFR BLD: 0.5 % (ref 0–1)
BILIRUB SERPL-MCNC: 0.3 MG/DL (ref 0.2–1)
BUN SERPL-MCNC: 22 MG/DL (ref 6–20)
BUN/CREAT SERPL: 21 (ref 12–20)
CALCIUM SERPL-MCNC: 9.2 MG/DL (ref 8.5–10.1)
CHLORIDE SERPL-SCNC: 103 MMOL/L (ref 97–108)
CHOLEST SERPL-MCNC: 202 MG/DL
CO2 SERPL-SCNC: 33 MMOL/L (ref 21–32)
CREAT SERPL-MCNC: 1.03 MG/DL (ref 0.55–1.02)
DIFFERENTIAL METHOD BLD: NORMAL
EOSINOPHIL # BLD: 0.04 K/UL (ref 0–0.4)
EOSINOPHIL NFR BLD: 0.6 % (ref 0–7)
ERYTHROCYTE [DISTWIDTH] IN BLOOD BY AUTOMATED COUNT: 12 % (ref 11.5–14.5)
EST. AVERAGE GLUCOSE BLD GHB EST-MCNC: 105 MG/DL
GLOBULIN SER CALC-MCNC: 3.2 G/DL (ref 2–4)
GLUCOSE SERPL-MCNC: 93 MG/DL (ref 65–100)
HBA1C MFR BLD: 5.3 % (ref 4–5.6)
HCT VFR BLD AUTO: 39 % (ref 35–47)
HDLC SERPL-MCNC: 93 MG/DL
HDLC SERPL: 2.2 (ref 0–5)
HGB BLD-MCNC: 12.6 G/DL (ref 11.5–16)
IMM GRANULOCYTES # BLD AUTO: 0.02 K/UL (ref 0–0.04)
IMM GRANULOCYTES NFR BLD AUTO: 0.3 % (ref 0–0.5)
LDLC SERPL CALC-MCNC: 90.6 MG/DL (ref 0–100)
LYMPHOCYTES # BLD: 1.51 K/UL (ref 0.8–3.5)
LYMPHOCYTES NFR BLD: 24.4 % (ref 12–49)
MCH RBC QN AUTO: 27.9 PG (ref 26–34)
MCHC RBC AUTO-ENTMCNC: 32.3 G/DL (ref 30–36.5)
MCV RBC AUTO: 86.5 FL (ref 80–99)
MONOCYTES # BLD: 0.44 K/UL (ref 0–1)
MONOCYTES NFR BLD: 7.1 % (ref 5–13)
NEUTS SEG # BLD: 4.16 K/UL (ref 1.8–8)
NEUTS SEG NFR BLD: 67.1 % (ref 32–75)
NRBC # BLD: 0 K/UL (ref 0–0.01)
NRBC BLD-RTO: 0 PER 100 WBC
PLATELET # BLD AUTO: 350 K/UL (ref 150–400)
PMV BLD AUTO: 11.1 FL (ref 8.9–12.9)
POTASSIUM SERPL-SCNC: 3.7 MMOL/L (ref 3.5–5.1)
PROT SERPL-MCNC: 6.8 G/DL (ref 6.4–8.2)
RBC # BLD AUTO: 4.51 M/UL (ref 3.8–5.2)
SODIUM SERPL-SCNC: 139 MMOL/L (ref 136–145)
T4 FREE SERPL-MCNC: 0.9 NG/DL (ref 0.8–1.5)
TRIGL SERPL-MCNC: 92 MG/DL
TSH SERPL DL<=0.05 MIU/L-ACNC: 1.48 UIU/ML (ref 0.36–3.74)
VLDLC SERPL CALC-MCNC: 18.4 MG/DL
WBC # BLD AUTO: 6.2 K/UL (ref 3.6–11)

## 2025-05-30 ENCOUNTER — RESULTS FOLLOW-UP (OUTPATIENT)
Age: 60
End: 2025-05-30

## 2025-05-30 RX ORDER — ERGOCALCIFEROL 1.25 MG/1
50000 CAPSULE, LIQUID FILLED ORAL WEEKLY
Qty: 12 CAPSULE | Refills: 0 | Status: SHIPPED | OUTPATIENT
Start: 2025-05-30

## 2025-06-12 ENCOUNTER — TELEMEDICINE (OUTPATIENT)
Age: 60
End: 2025-06-12

## 2025-06-12 DIAGNOSIS — F33.1 MAJOR DEPRESSIVE DISORDER, RECURRENT, MODERATE (HCC): Primary | ICD-10-CM

## 2025-06-12 NOTE — PROGRESS NOTES
Non billable-this patient was present for our appt but informed the therapist she was concerned for her elderly mother.  Her mother sounded \"confused\" and this was not her baseline.  She was going to contact her PCP and try and get her in for an appt today.  We scheduled subsequent appts.  Mary Martinez, ALYSHAW

## 2025-07-03 ENCOUNTER — TELEMEDICINE (OUTPATIENT)
Age: 60
End: 2025-07-03
Payer: COMMERCIAL

## 2025-07-03 DIAGNOSIS — F33.1 MAJOR DEPRESSIVE DISORDER, RECURRENT, MODERATE (HCC): Primary | ICD-10-CM

## 2025-07-03 PROCEDURE — 90834 PSYTX W PT 45 MINUTES: CPT | Performed by: SOCIAL WORKER

## 2025-07-03 NOTE — PROGRESS NOTES
Follow Up Virtual Visit Billing    Session Time     Start Time:    3:00 pm  Finish Time:  3:47 pm    Total time spent for this encounter: 47 minutes    We did set a follow-up appointment with this clinician.      Return in about 1 week (around 7/10/2025).     Therapy Modalities   Building Insight, Cognitive , Client Empowerment, Mindfulness, and Stress Management    Assessment / Plan     Psychotherapy Target Symptoms:  change in daily functioning , depressed mood, and feelings of helplessness /  hopefulness    Assessment:  No Progress-This patient is frustrated because she is doing so poorly.  Her psychiatrist has now recommended another medication again.  She will continue on several of the medications but she will be weaning off Paxil and get on Effexor.  She has also started using her C-Pap machine and is sleeping a bit better.  The woman that was going to partner with the patient has decided she now doesn't want to partner.  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  Although the patient is frustrated, she has planned to g see her mother and go to the pool this weekend.  Ms. Wallace does not feel hopeful that her boyfriend will initiate any activities for the holiday.  No acute symptoms reported or observed.    Plan:  continue psychotherapy    1. Major depressive disorder, recurrent, moderate (HCC)       Ivis Wallace, was evaluated through a synchronous (real-time) audio-video encounter. The patient (or guardian if applicable) is aware that this is a billable service, which includes applicable co-pays. This Virtual Visit was conducted with patient's (and/or legal guardian's) consent. Patient identification was verified, and a caregiver was present when appropriate.   The patient was located at Home: [address] [place] [address] [address] [place] [address]  Provider was located at Home [place] Dept [place]  Confirm you are appropriately

## 2025-07-10 ENCOUNTER — TELEMEDICINE (OUTPATIENT)
Age: 60
End: 2025-07-10
Payer: COMMERCIAL

## 2025-07-10 DIAGNOSIS — F33.1 MAJOR DEPRESSIVE DISORDER, RECURRENT, MODERATE (HCC): Primary | ICD-10-CM

## 2025-07-10 PROCEDURE — 90834 PSYTX W PT 45 MINUTES: CPT | Performed by: SOCIAL WORKER

## 2025-07-10 NOTE — PSYCHOTHERAPY
Virtual Visit (At Home) -Follow Up    DX:    Diagnosis Orders   1. Major depressive disorder, recurrent, moderate (HCC)             Met with Ms. Wallace today for our follow up appt. No Progress-this patient reports not much as changed for her.  She will be starting her new medication next Tuesday and continues to titrate down on her current medication.  She admits to not having motivation or energy to do much but is worried when her co-worker informs her that she has found a new job, which she knows is coming at some point.  Ms. Wallace did make the effort to \"do something fun\" over the July 4th weekend by going to the pool, however, she stated she didn't really relax and enjoy herself as she felt \"fidgety\" when she should have been feeling content.  This week the patient has stated she is going to try and talk to her partner about how she has been feeling more depressed and struggling with her motivation, etc.  Ms. Wallace reports that she has only had her parents as support throughout her life.  Now her mother is less \"available\" due to her declining health and mental status and her father is .  She does not identify a partner, boyfriend, friend, etc. That has ever been someone she could use as support.  No acute symptoms reported today.      We did set a follow-up appointment with this clinician.      Follow-up appt date (if applicable) is:  1 week    --Mary Martinez LCSW on 7/10/2025 at 5:12 PM    An electronic signature was used to authenticate this note.

## 2025-07-10 NOTE — PROGRESS NOTES
Follow Up Virtual Visit Billing    Session Time     Start Time:    4:00 pm  Finish Time:  4:46 pm    Total time spent for this encounter: 46 minutes    We did set a follow-up appointment with this clinician.      Return in about 1 week (around 2025).     Therapy Modalities   Building Self Esteem, Building Insight, Client Empowerment, Mindfulness, and Stress Management    Assessment / Plan     Psychotherapy Target Symptoms:  anxiety, depressed mood, restlessness, and lack of motivation    Assessment:  No Progress-this patient reports not much as changed for her.  She will be starting her new medication next Tuesday and continues to titrate down on her current medication.  She admits to not having motivation or energy to do much but is worried when her co-worker informs her that she has found a new job, which she knows is coming at some point.  Ms. Wallace did make the effort to \"do something fun\" over the July 4th weekend by going to the pool, however, she stated she didn't really relax and enjoy herself as she felt \"fidgety\" when she should have been feeling content.  This week the patient has stated she is going to try and talk to her partner about how she has been feeling more depressed and struggling with her motivation, etc.  Ms. Wallace reports that she has only had her parents as support throughout her life.  Now her mother is less \"available\" due to her declining health and mental status and her father is .  She does not identify a partner, boyfriend, friend, etc. That has ever been someone she could use as support.  No acute symptoms reported today.    Plan:  continue psychotherapy    1. Major depressive disorder, recurrent, moderate (HCC)       Ivis Wallace, was evaluated through a synchronous (real-time) audio-video encounter. The patient (or guardian if applicable) is aware that this is a billable service, which includes applicable co-pays. This Virtual Visit was conducted with

## 2025-07-17 ENCOUNTER — TELEMEDICINE (OUTPATIENT)
Age: 60
End: 2025-07-17
Payer: COMMERCIAL

## 2025-07-17 DIAGNOSIS — F33.1 MAJOR DEPRESSIVE DISORDER, RECURRENT, MODERATE (HCC): Primary | ICD-10-CM

## 2025-07-17 PROCEDURE — 90834 PSYTX W PT 45 MINUTES: CPT | Performed by: SOCIAL WORKER

## 2025-07-17 NOTE — PROGRESS NOTES
Follow Up Virtual Visit Billing    Session Time     Start Time:    11:00 am  Finish Time:  11:47 am    Total time spent for this encounter: 47 minutes    We did set a follow-up appointment with this clinician.      Return in about 1 week (around 7/24/2025).     Therapy Modalities   Building Self Esteem, Building Insight, Client Empowerment, Stress Management, and support    Assessment / Plan     Psychotherapy Target Symptoms:  anxiety, change in daily functioning , decreased ADLs , depressed mood, feelings of helplessness /  hopefulness, increased sleeping, and changes in medication, lack of productivity.    Assessment:  Deterioration:  this patient reports she just started her new medication of 37.5 mg of Effexor and has reduced her Paxil to 10 mg.  Her psychiatrist wants her to maintain on this dosage for the next 3 weeks and at that time, increase the Effexor to 75 mg and be off the Paxil.  Ms. Wallace is tired of feeling down and depressed.  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  She is not being productive at her company and is fearful that her one full time worker will be putting her notice in soon.  Ms. Wallace states that making important decisions is difficult right now and she becomes overwhelmed.  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  However, with the bid she recently received, it is not even close to what she hopes to get.  We discussed being proactive now and putting an ad out for this woman's position so the hired worker can be trained.  Otherwise, Ms. Wallace will be stuck with all these clients and no one to do the work.  She is going to try and complete this task before our appt next week.  Ms. Wallace is reporting sleeping, staying in bed, tired, overwhelmed.  She is not reporting acute symptoms, however, at this time.  WE are

## 2025-07-17 NOTE — PSYCHOTHERAPY
Virtual Visit (At Home) -Follow Up    DX:    Diagnosis Orders   1. Major depressive disorder, recurrent, moderate (HCC)             Met with Ms. Wallace today for our follow up appt. Deterioration:  this patient reports she just started her new medication of 37.5 mg of Effexor and has reduced her Paxil to 10 mg.  Her psychiatrist wants her to maintain on this dosage for the next 3 weeks and at that time, increase the Effexor to 75 mg and be off the Paxil.  Ms. Wallace is tired of feeling down and depressed.  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  She is not being productive at her company and is fearful that her one full time worker will be putting her notice in soon.  Ms. Wallace states that making important decisions is difficult right now and she becomes overwhelmed.  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  However, with the bid she recently received, it is not even close to what she hopes to get.  We discussed being proactive now and putting an ad out for this woman's position so the hired worker can be trained.  Otherwise, Ms. Wallace will be stuck with all these clients and no one to do the work.  She is going to try and complete this task before our appt next week.  Ms. Wallace is reporting sleeping, staying in bed, tired, overwhelmed.  She is not reporting acute symptoms, however, at this time.  WE are meeting weekly.    We did set a follow-up appointment with this clinician.      Follow-up appt date (if applicable) is:  July 25 @ 11 VV    --Mary Martinez LCSW on 7/17/2025 at 2:47 PM    An electronic signature was used to authenticate this note.     no

## 2025-07-24 ENCOUNTER — TELEMEDICINE (OUTPATIENT)
Age: 60
End: 2025-07-24
Payer: COMMERCIAL

## 2025-07-24 DIAGNOSIS — F33.1 MAJOR DEPRESSIVE DISORDER, RECURRENT, MODERATE (HCC): Primary | ICD-10-CM

## 2025-07-24 PROCEDURE — 90832 PSYTX W PT 30 MINUTES: CPT | Performed by: SOCIAL WORKER

## 2025-07-24 NOTE — PROGRESS NOTES
Follow Up Virtual Visit Billing    Session Time     Start Time:    11:09 am-pt running late  Finish Time:  11:35 am    Total time spent for this encounter: 26 minutes    We did set a follow-up appointment with this clinician.      Return in about 1 week (around 7/31/2025).     Therapy Modalities   Building Self Esteem, Building Insight, Cognitive , Client Empowerment, Stress Management, and support.    Assessment / Plan     Psychotherapy Target Symptoms:  anxiety, decreased ADLs , depressed mood, feelings of helplessness /  hopefulness, and lack of motivation, difficulty concentrating and making decisions.    Assessment:  minimal progress-this patient reports she is now on her new medication.  Yesterday she did not take any PRN anxiety medication which is a good sign.  She continues, however, to report having a hard time making decisions, completing tasks-avoidance, feeling unmotivated.  She states that she spends most time either with her mother (sitting around) or at home, either in bed or watching television.  We discussed that as the medication, hopefully, starts to work, she needs to work on the actions/behaviors she has been relying on to \"get thru the days.\"  She agreed and will try and complete a task, at least, per day.  No acute symptoms reported.    Plan:  continue psychotherapy    1. Major depressive disorder, recurrent, moderate (HCC)       Ivis Wallace, was evaluated through a synchronous (real-time) audio-video encounter. The patient (or guardian if applicable) is aware that this is a billable service, which includes applicable co-pays. This Virtual Visit was conducted with patient's (and/or legal guardian's) consent. Patient identification was verified, and a caregiver was present when appropriate.   The patient was located at Home: [address] [place] [address] [address] [place] [address]  Provider was located at Home [place]  Confirm you are appropriately licensed, registered, or certified to

## 2025-07-24 NOTE — PSYCHOTHERAPY
Virtual Visit (At Home) -Follow Up    DX:    Diagnosis Orders   1. Major depressive disorder, recurrent, moderate (HCC)             Met with Ms. Wallace today for our follow up appt. minimal progress-this patient reports she is now on her new medication.  Yesterday she did not take any PRN anxiety medication which is a good sign.  She continues, however, to report having a hard time making decisions, completing tasks-avoidance, feeling unmotivated.  She states that she spends most time either with her mother (sitting around) or at home, either in bed or watching television.  We discussed that as the medication, hopefully, starts to work, she needs to work on the actions/behaviors she has been relying on to \"get thru the days.\"  She agreed and will try and complete a task, at least, per day.  No acute symptoms reported.      We did set a follow-up appointment with this clinician.      Follow-up appt date (if applicable) is:  next week, VV    --Mary Martinez LCSW on 7/24/2025 at 12:41 PM    An electronic signature was used to authenticate this note.

## 2025-07-31 ENCOUNTER — TELEMEDICINE (OUTPATIENT)
Age: 60
End: 2025-07-31
Payer: COMMERCIAL

## 2025-07-31 DIAGNOSIS — F33.1 MAJOR DEPRESSIVE DISORDER, RECURRENT, MODERATE (HCC): Primary | ICD-10-CM

## 2025-07-31 PROCEDURE — 90832 PSYTX W PT 30 MINUTES: CPT | Performed by: SOCIAL WORKER

## 2025-07-31 NOTE — PROGRESS NOTES
Follow Up Virtual Visit Billing    Session Time     Start Time:    2:00 pm  Finish Time:  2:31 pm    Total time spent for this encounter: 31 minutes    We did set a follow-up appointment with this clinician.      Return in about 1 week (around 8/7/2025).     Therapy Modalities   Building Self Esteem, Cognitive , Client Empowerment, Mindfulness, and Stress Management    Assessment / Plan     Psychotherapy Target Symptoms:  anxiety, change in daily functioning , depressed mood, tearful, and poor concentration, difficulty making decisions, feeling overwhelmed.    Assessment:  No Progress-Ms. Wallace reports today that she has just increased her medication as of yesterday.  She doesn't feel any better and is frustrated that she continues to feel overwhelmed and depressed.  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  Additionally, she is still not comfortable with the price she is being offered for all the time, energy, clientele, etc. She has put into the business.  However, she feels pressured and is somewhat panicked to make a decision.  She does not feel ready or able to make a decision right now based on her mental state at this time.  This therapist recommended that she inform that person of her thoughts as well as to talk to someone that could help her make the offer more in her favor.  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  Then she states that she doesn't feel productive at work and feels overwhelmed with the responsibility of the business.  Again, we talked about not rushing into anything at this time.  WE have scheduled a follow up for next week.    Plan:  continue psychotherapy and medication maintenance    1. Major depressive disorder, recurrent, moderate (HCC)       Ivis Wallace,

## 2025-07-31 NOTE — PSYCHOTHERAPY
Virtual Visit (At Home) -Follow Up    DX:    Diagnosis Orders   1. Major depressive disorder, recurrent, moderate (HCC)             Met with Ms. Wallace today for our follow up appt. No Progress-Ms. Wallace reports today that she has just increased her medication as of yesterday.  She doesn't feel any better and is frustrated that she continues to feel overwhelmed and depressed.  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  Additionally, she is still not comfortable with the price she is being offered for all the time, energy, clientele, etc. She has put into the business.  However, she feels pressured and is somewhat panicked to make a decision.  She does not feel ready or able to make a decision right now based on her mental state at this time.  This therapist recommended that she inform that person of her thoughts as well as to talk to someone that could help her make the offer more in her favor.  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  Then she states that she doesn't feel productive at work and feels overwhelmed with the responsibility of the business.  Again, we talked about not rushing into anything at this time.  WE have scheduled a follow up for next week.    We did set a follow-up appointment with this clinician.      Follow-up appt date (if applicable) is:  August 7 @ 11 VV    --Mary Martinez LCSW on 7/31/2025 at 5:30 PM    An electronic signature was used to authenticate this note.

## 2025-08-07 ENCOUNTER — TELEMEDICINE (OUTPATIENT)
Age: 60
End: 2025-08-07
Payer: COMMERCIAL

## 2025-08-07 DIAGNOSIS — F33.1 MAJOR DEPRESSIVE DISORDER, RECURRENT, MODERATE (HCC): Primary | ICD-10-CM

## 2025-08-07 PROCEDURE — 90837 PSYTX W PT 60 MINUTES: CPT | Performed by: SOCIAL WORKER

## 2025-08-14 ENCOUNTER — TELEMEDICINE (OUTPATIENT)
Age: 60
End: 2025-08-14
Payer: COMMERCIAL

## 2025-08-14 DIAGNOSIS — F33.1 MAJOR DEPRESSIVE DISORDER, RECURRENT, MODERATE (HCC): Primary | ICD-10-CM

## 2025-08-14 PROCEDURE — 90832 PSYTX W PT 30 MINUTES: CPT | Performed by: SOCIAL WORKER

## 2025-08-21 ENCOUNTER — TELEMEDICINE (OUTPATIENT)
Age: 60
End: 2025-08-21
Payer: COMMERCIAL

## 2025-08-21 DIAGNOSIS — F33.1 MAJOR DEPRESSIVE DISORDER, RECURRENT, MODERATE (HCC): Primary | ICD-10-CM

## 2025-08-21 PROCEDURE — 90834 PSYTX W PT 45 MINUTES: CPT | Performed by: SOCIAL WORKER

## 2025-09-04 ENCOUNTER — TELEMEDICINE (OUTPATIENT)
Age: 60
End: 2025-09-04
Payer: COMMERCIAL

## 2025-09-04 DIAGNOSIS — F33.1 MAJOR DEPRESSIVE DISORDER, RECURRENT, MODERATE (HCC): Primary | ICD-10-CM

## 2025-09-04 PROCEDURE — 90837 PSYTX W PT 60 MINUTES: CPT | Performed by: SOCIAL WORKER
